# Patient Record
Sex: MALE | Race: WHITE | NOT HISPANIC OR LATINO | Employment: FULL TIME | URBAN - METROPOLITAN AREA
[De-identification: names, ages, dates, MRNs, and addresses within clinical notes are randomized per-mention and may not be internally consistent; named-entity substitution may affect disease eponyms.]

---

## 2022-05-24 ENCOUNTER — ANCILLARY PROCEDURE (OUTPATIENT)
Dept: GENERAL RADIOLOGY | Facility: CLINIC | Age: 32
End: 2022-05-24
Attending: FAMILY MEDICINE
Payer: OTHER MISCELLANEOUS

## 2022-05-24 ENCOUNTER — OFFICE VISIT (OUTPATIENT)
Dept: FAMILY MEDICINE | Facility: CLINIC | Age: 32
End: 2022-05-24
Payer: OTHER MISCELLANEOUS

## 2022-05-24 VITALS
SYSTOLIC BLOOD PRESSURE: 113 MMHG | DIASTOLIC BLOOD PRESSURE: 73 MMHG | OXYGEN SATURATION: 94 % | HEART RATE: 54 BPM | TEMPERATURE: 98.2 F | RESPIRATION RATE: 18 BRPM | WEIGHT: 164 LBS

## 2022-05-24 DIAGNOSIS — S99.922A FOOT INJURY, LEFT, INITIAL ENCOUNTER: Primary | ICD-10-CM

## 2022-05-24 DIAGNOSIS — S99.921A FOOT INJURY, RIGHT, INITIAL ENCOUNTER: ICD-10-CM

## 2022-05-24 PROCEDURE — 73630 X-RAY EXAM OF FOOT: CPT | Mod: TC | Performed by: RADIOLOGY

## 2022-05-24 PROCEDURE — 99213 OFFICE O/P EST LOW 20 MIN: CPT | Performed by: FAMILY MEDICINE

## 2022-05-24 ASSESSMENT — PATIENT HEALTH QUESTIONNAIRE - PHQ9
SUM OF ALL RESPONSES TO PHQ QUESTIONS 1-9: 16
10. IF YOU CHECKED OFF ANY PROBLEMS, HOW DIFFICULT HAVE THESE PROBLEMS MADE IT FOR YOU TO DO YOUR WORK, TAKE CARE OF THINGS AT HOME, OR GET ALONG WITH OTHER PEOPLE: EXTREMELY DIFFICULT
SUM OF ALL RESPONSES TO PHQ QUESTIONS 1-9: 16

## 2022-05-24 NOTE — LETTER
[facility] [address] [place] [address]  Phone: [phone]    May 24, 2022        Victor Manuel Saavedra  [facility] [place] [address]          To whom it may concern:    RE: Victor Manuel Saavedra    Patient was seen and treated today at our clinic. Please excuse pt from work until 5/30/2022 to recover from an injury.    Please contact me for questions or concerns.      Sincerely,        Vincent Nunes, DO

## 2022-05-24 NOTE — PROGRESS NOTES
Assessment & Plan     Foot injury, left, initial encounter  -Pain mostly over 1st metatarsal, no obvious deformity. Most likely strain injury, persistent as pt still working on his feet.  -Discuss obtaining xray today to look for any bony abnormalities. Recommended post-op shoe and off work for the next week to help with healing, work letter provided  -Podiatry referral place per patient request  - POST OP SHOE DELUXE MALE  - XR Foot Left G/E 3 Views  - Orthopedic  Referral    Vincent Nunes DO  [facility]    Federico Rush is a 31 year old who presents for the following health issues     New patient. Here for foot injury that occurred on 5/14/2022. Patient climbs towers for work. Was climbing tower with metal rungs when he placed all of his weight over his left foot to leverage himself up. Has had pain over the bones in his large toe since. Was wearing steel toed shoes. Pain persistent, worse after working. Has been on light duty past few days but still having pain. States no significant swelling or brusing. Would like referral to podiatrist. Denies numbness, tingling, weakness.     Review of Systems         Objective    /73 (BP Location: Left arm, Patient Position: Sitting, Cuff Size: Adult Regular)   Pulse 54   Temp 98.2  F (36.8  C) (Temporal)   Resp 18   Wt 74.4 kg (164 lb)   SpO2 94%   There is no height or weight on file to calculate BMI.  Physical Exam   GENERAL: healthy, alert and no distress  MS: left foot: tenderness over mid-anterior 1st metatarsal to palpation, no deformities noted. Strength and sensation in left foot intact.

## 2022-05-26 NOTE — TELEPHONE ENCOUNTER
DIAGNOSIS: (L) foot injury / Manju, Vincent / [facility] / XR   APPOINTMENT DATE: 6.8.22   NOTES STATUS DETAILS   OFFICE NOTE from referring provider Internal 5.24.22 Dr Vincent Nunes, [facility]   MEDICATION LIST Internal    XRAYS (IMAGES & REPORTS) Internal 5.24.22 L foot

## 2022-05-27 ENCOUNTER — MYC MEDICAL ADVICE (OUTPATIENT)
Dept: FAMILY MEDICINE | Facility: CLINIC | Age: 32
End: 2022-05-27
Payer: COMMERCIAL

## 2022-05-27 NOTE — TELEPHONE ENCOUNTER
Forwarding to Dr. Nunes.  Work comp episode started for date of visit 5/24/22.    WILMA Santoyo RN  [facility]

## 2022-05-31 ENCOUNTER — VIRTUAL VISIT (OUTPATIENT)
Dept: FAMILY MEDICINE | Facility: CLINIC | Age: 32
End: 2022-05-31
Payer: OTHER MISCELLANEOUS

## 2022-05-31 DIAGNOSIS — S99.922D FOOT INJURY, LEFT, SUBSEQUENT ENCOUNTER: Primary | ICD-10-CM

## 2022-05-31 DIAGNOSIS — Z02.6 ENCOUNTER RELATED TO WORKER'S COMPENSATION CLAIM: ICD-10-CM

## 2022-05-31 PROCEDURE — 99212 OFFICE O/P EST SF 10 MIN: CPT | Mod: 95 | Performed by: PHYSICIAN ASSISTANT

## 2022-05-31 NOTE — PROGRESS NOTES
Victor Manuel is a 31 year old who is being evaluated via a billable video visit.      How would you like to obtain your AVS? MyChart  If the video visit is dropped, the invitation should be resent by: Text to cell phone: [phone]   Will anyone else be joining your video visit? No    Video Start Time: 8:13 AM     Assessment & Plan     Foot injury, left, subsequent encounter  Encounter related to workers compensation claim  Patient needing note today to extend time off for injury. He does not feel light duty would be a good option. Has appointment with Sports Medicine next week. This is a virtual appointment today - not ideal to evaluate his symptoms.        Tobacco Cessation:   reports that he has been smoking. He has never used smokeless tobacco.  Tobacco Cessation Action Plan: Information offered: Patient not interested at this time        Return in about 8 days (around 6/8/2022) for Follow up with ortho.    THAD[facility]US Chaves [facility]    Federico Rush is a 31 year old who presents for the following health issues     HPI     Pain History:  When did you first notice your pain? - Acute Pain   Have you seen anyone else for your pain? Yes -[place] provider through [facility]  Where in your body do you have pain? Musculoskeletal problem/pain  Onset/Duration: Friday the 13th of May  Description  Location: foot - left  Joint Swelling: YES- minor  Redness: YES  Pain: YES  Warmth: YES  Intensity:  moderate  Progression of Symptoms:  same  Accompanying signs and symptoms:   Fevers: no  Numbness/tingling/weakness: no  History  Trauma to the area: YES- climbing a cell phone tower  Recent illness:  no  Previous similar problem: no  Previous evaluation:  YES  Precipitating or alleviating factors:  Aggravating factors include: walking, standing, sitting, sleeping  Therapies tried and outcome: Ibuprofen on occasion    Doesn't feel like there has been a change in symptoms.   Cell phone .      Concerned this is a lifetime injury.          Review of Systems   Constitutional, HEENT, cardiovascular, pulmonary, gi and gu systems are negative, except as otherwise noted.      Objective           Vitals:  No vitals were obtained today due to virtual visit.    Physical Exam   GENERAL: Healthy, alert and no distress  EYES: Eyes grossly normal to inspection.  No discharge or erythema, or obvious scleral/conjunctival abnormalities.  RESP: No audible wheeze, cough, or visible cyanosis.  No visible retractions or increased work of breathing.    SKIN: Visible skin clear. No significant rash, abnormal pigmentation or lesions.  NEURO: Cranial nerves grossly intact.  Mentation and speech appropriate for age.  PSYCH: Mentation appears normal, affect normal/bright, judgement and insight intact, normal speech and appearance well-groomed.                Video-Visit Details    Type of service:  Video Visit    Video End Time:8:22 AM    Originating Location (pt. Location): Home    Distant Location (provider location):  [facility]     Platform used for Video Visit: ROIÂ²

## 2022-05-31 NOTE — TELEPHONE ENCOUNTER
pt had virtual visit today about foot issue, see note RN wrote to pt    Kimberly Castro RN, BSN  [facility]

## 2022-05-31 NOTE — LETTER
May 31, 2022      Victor Manuel Saavedra  [facility] [place] [address]        To Whom It May Concern:    Victor Manuel Saavedra was seen in our clinic. He may not return to work at this time. He has a follow up on June 8th and restrictions will be re-evaluated at that time.      Sincerely,        Cathy Akhtar PA-C

## 2022-06-06 ENCOUNTER — TELEPHONE (OUTPATIENT)
Dept: BEHAVIORAL HEALTH | Facility: CLINIC | Age: 32
End: 2022-06-06
Payer: COMMERCIAL

## 2022-06-08 ENCOUNTER — OFFICE VISIT (OUTPATIENT)
Dept: ORTHOPEDICS | Facility: CLINIC | Age: 32
End: 2022-06-08
Payer: OTHER MISCELLANEOUS

## 2022-06-08 ENCOUNTER — HOSPITAL ENCOUNTER (OUTPATIENT)
Dept: BEHAVIORAL HEALTH | Facility: CLINIC | Age: 32
Discharge: HOME | End: 2022-06-08
Attending: FAMILY MEDICINE
Payer: COMMERCIAL

## 2022-06-08 ENCOUNTER — PRE VISIT (OUTPATIENT)
Dept: ORTHOPEDICS | Facility: CLINIC | Age: 32
End: 2022-06-08
Payer: COMMERCIAL

## 2022-06-08 VITALS — WEIGHT: 164 LBS

## 2022-06-08 DIAGNOSIS — S93.522A TURF TOE OF LEFT FOOT: Primary | ICD-10-CM

## 2022-06-08 PROCEDURE — 99203 OFFICE O/P NEW LOW 30 MIN: CPT | Performed by: FAMILY MEDICINE

## 2022-06-08 PROCEDURE — 999N000216 HC STATISTIC ADULT CD FACE TO FACE-NO CHRG: Mod: GT | Performed by: COUNSELOR

## 2022-06-08 ASSESSMENT — ANXIETY QUESTIONNAIRES
1. FEELING NERVOUS, ANXIOUS, OR ON EDGE: NEARLY EVERY DAY
7. FEELING AFRAID AS IF SOMETHING AWFUL MIGHT HAPPEN: NEARLY EVERY DAY
6. BECOMING EASILY ANNOYED OR IRRITABLE: NEARLY EVERY DAY
2. NOT BEING ABLE TO STOP OR CONTROL WORRYING: NEARLY EVERY DAY
GAD7 TOTAL SCORE: 21
8. IF YOU CHECKED OFF ANY PROBLEMS, HOW DIFFICULT HAVE THESE MADE IT FOR YOU TO DO YOUR WORK, TAKE CARE OF THINGS AT HOME, OR GET ALONG WITH OTHER PEOPLE?: EXTREMELY DIFFICULT
3. WORRYING TOO MUCH ABOUT DIFFERENT THINGS: NEARLY EVERY DAY
5. BEING SO RESTLESS THAT IT IS HARD TO SIT STILL: NEARLY EVERY DAY
GAD7 TOTAL SCORE: 21
4. TROUBLE RELAXING: NEARLY EVERY DAY
GAD7 TOTAL SCORE: 21
7. FEELING AFRAID AS IF SOMETHING AWFUL MIGHT HAPPEN: NEARLY EVERY DAY

## 2022-06-08 ASSESSMENT — COLUMBIA-SUICIDE SEVERITY RATING SCALE - C-SSRS
4. HAVE YOU HAD THESE THOUGHTS AND HAD SOME INTENTION OF ACTING ON THEM?: NO
6. HAVE YOU EVER DONE ANYTHING, STARTED TO DO ANYTHING, OR PREPARED TO DO ANYTHING TO END YOUR LIFE?: NO
3. HAVE YOU BEEN THINKING ABOUT HOW YOU MIGHT KILL YOURSELF?: NO
1. IN THE PAST MONTH, HAVE YOU WISHED YOU WERE DEAD OR WISHED YOU COULD GO TO SLEEP AND NOT WAKE UP?: NO
5. HAVE YOU STARTED TO WORK OUT OR WORKED OUT THE DETAILS OF HOW TO KILL YOURSELF? DO YOU INTEND TO CARRY OUT THIS PLAN?: NO
2. HAVE YOU ACTUALLY HAD ANY THOUGHTS OF KILLING YOURSELF IN THE PAST MONTH?: NO

## 2022-06-08 NOTE — LETTER
6/8/2022      RE: Victor Manuel Saavedra  [facility] [place] [address]     Dear Colleague,    Thank you for referring your patient, Victor Manuel Saavedra, to the [facility] [place]. Please see a copy of my visit note below.    Sports Medicine Clinic Visit    PCP: No primary care provider on file.    Victor Manuel Saavedra is a 31 year old male who is seen  in consultation at the request of Dr. Nunes presenting with left great toe pain      Location of Pain: right first MTp  Duration of Pain: 1 month(s)  Rating of Pain: 6/10  Pain is better with: Post op shoe  Pain is worse with: walking, standing, sleeping  Additional Features: tenderness  Treatment so far consists of: post op shoes, ibuprofen, ice, rest  Prior History of related problems: none    There were no vitals taken for this visit.    Acute left foot injury 5/14/2022, three weeks ago.    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  As he did so, his left great toe at the MTP was forced into plantarflexion, as the dorsum of the MTP happened to be levered against a cross beam concomitantly.   Has had pain over the dorsum of his large toe MTP since. Has been on light duty past few days but still having pain. States no significant swelling or bruising at the time.  Negative x-ray of the foot 5/24/2022 with no bony abnormality involving the great toe.      Images reviewed by me:  XR FOOT LEFT G/E 3 VIEWS 5/24/2022 1:49 PM      HISTORY: pain over great metatarsal bone. Injured while climbing  ladder x 1.5 weeks; Foot injury, right, initial encounter     COMPARISON: None.                                                                       IMPRESSION: Normal joint spaces and alignment. No evidence of a  fracture, with attention to the first  metatarsal.     KATIE UMANZOR MD           Procedure /Surgery  Excision of capillary hemangioma left ankle  Date of Procedure/Surgery- 4/12/17 / [facility]    XR FOOT 3 VIEWS LEFT  2/16/2017  [facility]    Narrative    RADIOGRAPHIC FINDINGS:     3 weightbearing views of the left foot were obtained in clinic today.   Calcaneal inclination angle is normal. Talar declination angle is nromal.   1-2 intermetatarsal angle is normal.  Elevated first ray. No fractures   noted. Metatarsus adductus noted.     Genesis Staton DPM   Foot and Ankle Surgery/Podiatry       PMH:  Active problem list:  Patient Active Problem List   Diagnosis     Dysthymic Disorder     Traumatic Urethral Stricture     Fatigue     Tachycardia     Unspecified epilepsy without mention of intractable epilepsy     Fainting     Headache     Hematuria, unspecified     Urethral stricture unspecified       FH:  No family history on file.    SH:  Social History     Socioeconomic History     Marital status: Single     Spouse name: Not on file     Number of children: Not on file     Years of education: Not on file     Highest education level: Not on file   Occupational History     Not on file   Tobacco Use     Smoking status: Current Some Day Smoker     Smokeless tobacco: Never Used   Substance and Sexual Activity     Alcohol use: Yes     Drug use: Never     Sexual activity: Not on file   Other Topics Concern     Not on file   Social History Narrative     Not on file     Social Determinants of Health     Financial Resource Strain: Not on file   Food Insecurity: Not on file   Transportation Needs: Not on file   Physical Activity: Not on file   Stress: Not on file   Social Connections: Not on file   Intimate Partner Violence: Not on file   Housing Stability: Not on file       MEDS:  See EMR, reviewed  ALL:  See EMR, reviewed    REVIEW OF SYSTEMS:  CONSTITUTIONAL:NEGATIVE for fever, chills, change in weight  INTEGUMENTARY/SKIN: NEGATIVE for worrisome rashes,  moles or lesions  EYES: NEGATIVE for vision changes or irritation  ENT/MOUTH: NEGATIVE for ear, mouth and throat problems  RESP:NEGATIVE for significant cough or SOB  BREAST: NEGATIVE for masses, tenderness or discharge  CV: NEGATIVE for chest pain, palpitations or peripheral edema  GI: NEGATIVE for nausea, abdominal pain, heartburn, or change in bowel habits  :NEGATIVE for frequency, dysuria, or hematuria  :NEGATIVE for frequency, dysuria, or hematuria  NEURO: NEGATIVE for weakness, dizziness or paresthesias  ENDOCRINE: NEGATIVE for temperature intolerance, skin/hair changes  HEME/ALLERGY/IMMUNE: NEGATIVE for bleeding problems  PSYCHIATRIC: NEGATIVE for changes in mood or affect    Objective: There is no swelling or discoloration about the foot.  He is nontender in the area of Lisfranc.  He has some tenderness over the dorsum of the MTP, and will bother him dorsally with forced dorsiflexion.  He has full dorsiflexion and volar flexion passive range of motion.  He is nontender over the tibial or fibular sesamoids.  He has full strength to dorsiflexion and volar flexion of the toe against resistance.  Overlying skin is intact.  Appropriate in conversation affect.    We reviewed x-rays that show no acute fracture or bony abnormality involving the MTP.  He has a bone island but no signs of bipartite sesamoid.    Assessment MTP discomfort status post acute compression injury 3 weeks ago, suspect bone bruise or capsular injury consistent with turf toe    Plan: He will continue with the postop shoe or we discussed the possibility of substituting a half steel insert, reviewed online.  A work restriction was given for the next 3 weeks physical therapy in the meantime.    Again, thank you for allowing me to participate in the care of your patient.      Sincerely,    Gallito Bonilla MD

## 2022-06-08 NOTE — PATIENT INSTRUCTIONS
Date: Wednesday, 6/15/2022  Time: 9:00 am - 10:00 am  Provider: Sarah Beth Bedolla MA  [facility]  Location: [place], [address] [place] [address], [place] [id number]  Phone: [phone]  Type: Therapy - Initial (In-Person)    Patient Instructions  Paperwork available on our portal. Your email is require for us to set up portal access. [email or web address] Email: [email or web address]    Date: Thursday, 6/23/2022  Time: 2:00 pm - 3:00 pm  Provider: Donna Roach MA, PA-C  Location: [facility], [address] [place] [address], [facility] [address]  Phone: [phone]  Type: Telepsychiatry      Patient Instructions  Please fill New Patient Form by using following link or call us to request link to be sent to you. [email or web address] (1) All forms need to be completed ATLEAST 96 HOURS prior to appointment. (2) Please call us on [phone] - 96 HOURS prior to your scheduled appointment to confirm that you are able to attend. We will provide you information about how to log into video call software when you call.

## 2022-06-08 NOTE — LETTER
2022    Victor Manuel Saavedra  [address] [place] [address]  [phone] (home)     :     1990      To Whom it May Concern:    This patient was seen today in clinic for his left great toe injury that occurred 2022.  For the next 3 weeks, from 2022 to 2022, he will remain on light duty.  He will be at a seated job with minimal ambulation, and will not be squatting, climbing,or  lifting greater than 20 pounds.  He will continue with his toe immobilization device, and will see physical therapy.  Follow-up in 3 weeks for reevaluation of work restrictions.      Sincerely,        Gallito Bonilla MD

## 2022-06-08 NOTE — PROGRESS NOTES
"[facility]    ADULT Mental Health Assessment Appointment Note    PATIENT'S NAME:  Victor Manuel Saavedra    Preferred Pronoun: He/him   MRN: [id number]  YOB: 1990  Address:  [address] [place] [address]  PREFERRED PHONE: [phone]  May we leave a referral related message: Yes    DATE OF SERVICE: 22  START TIME: 1:30pm   END TIME: 1:43pm   SERVICE MODALITY:  Video Visit:      Provider verified identity through the following two step process.  Patient provided:  Patient  and Patient address    Telemedicine Visit: The patient's condition can be safely assessed and treated via synchronous audio and visual telemedicine encounter.      Reason for Telemedicine Visit: Services only offered telehealth    Originating Site (Patient Location): Patient's home    Distant Site (Provider Location): [facility]    Consent:  The patient/guardian has verbally consented to: the potential risks and benefits of telemedicine (video visit) versus in person care; bill my insurance or make self-payment for services provided; and responsibility for payment of non-covered services.     Patient would like the video invitation sent by:  My Chart    Mode of Communication:  Video Conference via [employer]    As the provider I attest to compliance with applicable laws and regulations related to telemedicine.    Identifying Information:  Patient is a 31 year old, .  Patient was referred for an assessment by self.  Patient attended the session alone. Patient identified their preferred language to be English. Patient reported they does not need the assistance of an  or other support involved in therapy.     Services Requested:   The reason for seeking services at this time is: \" ADHD/Anxiety medication/ life skills and PTSD therapy \" Current Mental Status Exam:   Appearance:  Appropriate    Eye Contact:  Good "   Psychomotor:  Normal   Attitude / Demeanor: Cooperative   Speech Rate:  Normal/ Responsive  Volume:  Normal  volume  Language:  no problems  Mood:   Normal  Affect:   Appropriate    Thought Content: Clear   Thought Process: Coherent     Associations:  No loosening of associations  Insight:   Good   Judgment:  Intact   Orientation:  All  Attention:  Good    Rating Scales:  PHQ9:    PHQ-9 SCORE 5/24/2022   PHQ-9 Total Score MyChart 16 (Moderately severe depression)   PHQ-9 Total Score 16   ;    GAD7:    STEPHANI-7 SCORE 6/8/2022   Total Score 21 (severe anxiety)   Total Score 21       Safety Assessment:   Current Safety Concerns:  [place] Suicide Severity Rating Scale (Lifetime/Recent)  [place] Suicide Severity Rating (Lifetime/Recent) 6/8/2022   Q1 Wished to be Dead (Past Month) no   Q2 Suicidal Thoughts (Past Month) no   Q3 Suicidal Thought Method no   Q4 Suicidal Intent without Specific Plan no   Q5 Suicide Intent with Specific Plan no   Q6 Suicide Behavior (Lifetime) no   Level of Risk per Screen low risk             Therapeutic Interventions Provided: supportive active listening and explored alternatives    Recommendations/Plan: Clinician explained DA process and options. Pt would like therapy and medication management.     Referrals:     Date: Wednesday, 6/15/2022  Time: 9:00 am - 10:00 am  Provider: Sarah Beth Bedolla MA  [facility]  Location: [facility], [address] [place] [address]  Phone: [phone]  Type: Therapy - Initial (In-Person)    Patient Instructions  Paperwork available on our portal. Your email is require for us to set up portal access. [email or web address] Email: [email or web address]    Date: Thursday, 6/23/2022  Time: 2:00 pm - 3:00 pm  Provider: Donna Roach MA  [facility]  Location: [facility], [address] [place] [address], [place] [address]  Phone: [phone]  Type: Telepsychiatry      Patient Instructions  Please  fill New Patient Form by using following link or call us to request link to be sent to you. [email or web address] (1) All forms need to be completed ATLEAST 96 HOURS prior to appointment. (2) Please call us on [phone] - 96 HOURS prior to your scheduled appointment to confirm that you are able to attend. We will provide you information about how to log into video call software when you call.      Mayi Giron [facility],  June 8, 2022  Mental Health and Addiction Services Assessment Center

## 2022-06-08 NOTE — PROGRESS NOTES
Sports Medicine Clinic Visit    PCP: No primary care provider on file.    Victor Manuel Saavedra is a 31 year old male who is seen  in consultation at the request of Dr. Nunes presenting with left great toe pain      Location of Pain: right first MTp  Duration of Pain: 1 month(s)  Rating of Pain: 6/10  Pain is better with: Post op shoe  Pain is worse with: walking, standing, sleeping  Additional Features: tenderness  Treatment so far consists of: post op shoes, ibuprofen, ice, rest  Prior History of related problems: none    There were no vitals taken for this visit.    Acute left foot injury 5/14/2022, three weeks ago.    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  As he did so, his left great toe at the MTP was forced into plantarflexion, as the dorsum of the MTP happened to be levered against a cross beam concomitantly.   Has had pain over the dorsum of his large toe MTP since. Has been on light duty past few days but still having pain. States no significant swelling or bruising at the time.  Negative x-ray of the foot 5/24/2022 with no bony abnormality involving the great toe.      Images reviewed by me:  XR FOOT LEFT G/E 3 VIEWS 5/24/2022 1:49 PM      HISTORY: pain over great metatarsal bone. Injured while climbing  ladder x 1.5 weeks; Foot injury, right, initial encounter     COMPARISON: None.                                                                       IMPRESSION: Normal joint spaces and alignment. No evidence of a  fracture, with attention to the first metatarsal.     KATIE UMANZOR MD           Procedure /Surgery  Excision of capillary hemangioma left ankle  Date of Procedure/Surgery- 4/12/17 / [facility]    XR FOOT 3 VIEWS LEFT  2/16/2017  [facility]    Narrative    RADIOGRAPHIC FINDINGS:     3  weightbearing views of the left foot were obtained in clinic today.   Calcaneal inclination angle is normal. Talar declination angle is nromal.   1-2 intermetatarsal angle is normal.  Elevated first ray. No fractures   noted. Metatarsus adductus noted.     Genesis Staton DPM   Foot and Ankle Surgery/Podiatry       PMH:  Active problem list:  Patient Active Problem List   Diagnosis     Dysthymic Disorder     Traumatic Urethral Stricture     Fatigue     Tachycardia     Unspecified epilepsy without mention of intractable epilepsy     Fainting     Headache     Hematuria, unspecified     Urethral stricture unspecified       FH:  No family history on file.    SH:  Social History     Socioeconomic History     Marital status: Single     Spouse name: Not on file     Number of children: Not on file     Years of education: Not on file     Highest education level: Not on file   Occupational History     Not on file   Tobacco Use     Smoking status: Current Some Day Smoker     Smokeless tobacco: Never Used   Substance and Sexual Activity     Alcohol use: Yes     Drug use: Never     Sexual activity: Not on file   Other Topics Concern     Not on file   Social History Narrative     Not on file     Social Determinants of Health     Financial Resource Strain: Not on file   Food Insecurity: Not on file   Transportation Needs: Not on file   Physical Activity: Not on file   Stress: Not on file   Social Connections: Not on file   Intimate Partner Violence: Not on file   Housing Stability: Not on file       MEDS:  See EMR, reviewed  ALL:  See EMR, reviewed    REVIEW OF SYSTEMS:  CONSTITUTIONAL:NEGATIVE for fever, chills, change in weight  INTEGUMENTARY/SKIN: NEGATIVE for worrisome rashes, moles or lesions  EYES: NEGATIVE for vision changes or irritation  ENT/MOUTH: NEGATIVE for ear, mouth and throat problems  RESP:NEGATIVE for significant cough or SOB  BREAST: NEGATIVE for masses, tenderness or discharge  CV: NEGATIVE for chest pain,  palpitations or peripheral edema  GI: NEGATIVE for nausea, abdominal pain, heartburn, or change in bowel habits  :NEGATIVE for frequency, dysuria, or hematuria  :NEGATIVE for frequency, dysuria, or hematuria  NEURO: NEGATIVE for weakness, dizziness or paresthesias  ENDOCRINE: NEGATIVE for temperature intolerance, skin/hair changes  HEME/ALLERGY/IMMUNE: NEGATIVE for bleeding problems  PSYCHIATRIC: NEGATIVE for changes in mood or affect    Objective: There is no swelling or discoloration about the foot.  He is nontender in the area of Lisfranc.  He has some tenderness over the dorsum of the MTP, and will bother him dorsally with forced dorsiflexion.  He has full dorsiflexion and volar flexion passive range of motion.  He is nontender over the tibial or fibular sesamoids.  He has full strength to dorsiflexion and volar flexion of the toe against resistance.  Overlying skin is intact.  Appropriate in conversation affect.    We reviewed x-rays that show no acute fracture or bony abnormality involving the MTP.  He has a bone island but no signs of bipartite sesamoid.    Assessment MTP discomfort status post acute compression injury 3 weeks ago, suspect bone bruise or capsular injury consistent with turf toe    Plan: He will continue with the postop shoe or we discussed the possibility of substituting a half steel insert, reviewed online.  A work restriction was given for the next 3 weeks physical therapy in the meantime.

## 2022-06-15 ENCOUNTER — HOSPITAL ENCOUNTER (OUTPATIENT)
Dept: PHYSICAL THERAPY | Facility: REHABILITATION | Age: 32
Discharge: HOME | End: 2022-06-15
Attending: FAMILY MEDICINE
Payer: OTHER MISCELLANEOUS

## 2022-06-15 DIAGNOSIS — R26.89 ANTALGIC GAIT: Primary | ICD-10-CM

## 2022-06-15 DIAGNOSIS — S93.522A TURF TOE OF LEFT FOOT: ICD-10-CM

## 2022-06-15 DIAGNOSIS — M25.675 DECREASED ROM OF LEFT FOOT: ICD-10-CM

## 2022-06-15 PROCEDURE — 97161 PT EVAL LOW COMPLEX 20 MIN: CPT | Mod: GP | Performed by: PHYSICAL THERAPIST

## 2022-06-15 PROCEDURE — 97110 THERAPEUTIC EXERCISES: CPT | Mod: GP | Performed by: PHYSICAL THERAPIST

## 2022-06-15 PROCEDURE — 97140 MANUAL THERAPY 1/> REGIONS: CPT | Mod: GP | Performed by: PHYSICAL THERAPIST

## 2022-06-16 NOTE — PROGRESS NOTES
06/15/22 1300   Signing Clinician's Name / Credentials   Signing clinician's name / credentials Saskia Kothari PT   Session Number   Session Number 1/12   Progress Note/Recertification   Progress Note Due Date 09/13/22   Adult Goals   PT Ortho Eval Goals 1;2;3   Ortho Goal 1   Goal Identifier home program   Goal Description patient will be independent with home exercise program and self management of pain in 8 weeks   Target Date 08/14/22   Ortho Goal 2   Goal Identifier walking   Goal Description patient will be able to return to walking for household chores on feet x 20 minutes with pain less than 5/10   Target Date 09/13/22   Ortho Goal 3   Goal Identifier work   Goal Description 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  As he did so, his left great toe at the MTP was forced into plantarflexion, as the dorsum of the MTP happened to be levered against a cross beam.  Has had pain over the plantar surface of his large toe MTP since. Has been on light duty past few weeks but still having pain. States no significant swelling or bruising at the time of injury but pain constant since injury of 5/10-10/10.  Also has been wearing post surgical boot with walking. Patient unable to walk longer than household or short community distances due to pain. Negative x-ray of the foot 5/24/2022. Patient is not taking any pain medications or OTC meds for pain.   Objective Measures   Objective Measures Objective Measure 1;Objective Measure 2;Objective Measure 3   Objective Measure 1   Objective Measure 1st MTP left foot ROM   Objective Measure 2    Objective Measure ankle strength pf/df   Objective Measure 3   Objective Measure gait observation   Treatment Interventions   Interventions Therapeutic Procedure/Exercise;Neuromuscular Re-education;Manual Therapy   Therapeutic Procedure/exercise   Therapeutic Procedures: strength, endurance, ROM, flexibillity minutes (62748) 8   Skilled Intervention therapeutic exercise   Patient Response patient is guarded with movement, but did slowly tolerate exercise   Treatment Detail supine ankle pumps df/pf x 10, supine ankle circles cw/ccw x 10, seated ankle pumps x 10, seated LAQ x 10   Neuromuscular Re-education   Skilled Intervention neuromuscular re-ed   Treatment Detail consider Kinesiotape for left foot MTP pain   Gait Training   Treatment Detail consider gait training   Manual Therapy   Manual Therapy: Mobilization, MFR, MLD, friction massage minutes (81192) 8   Skilled Intervention manual therapy   Patient Response guarded initially, then tolerated well   Treatment Detail supine 90/90 for MT with gentle STM effleurage distal to proximal of left foot/1st toe and MTP   Assessments Completed   Assessments Completed LEFS = 20/80   Equipment Needs   Equipment Needs walking boot/post-surgical boot (no surgery however)   Education   Learner Patient   Readiness Acceptance   Method Demonstration   Response Demonstrates Understanding   Plan   Homework PTRX   Home program ankle pumps supine, ankle circles, seated ankle pumps seated LAQ   Plan for next session review all ex, add towel scrunch left foot, nustep with boot on, standing left hip abd/flex/ext/ knee flexion   Comments   Comments non-weightbearing rom short term and progress as tolerated for left MTP injury   Total Session Time   Timed Code Treatment Minutes 16   Total Treatment Time (sum of timed and untimed services) 16   AMBULATORY CLINICS ONLY-MEDICAL AND TREATMENT DIAGNOSIS   Medical Diagnosis left foot turf toe (1st MTP)   PT Diagnosis left foot pain at 1st MTP    ALMA HIGGINS, PT

## 2022-06-16 NOTE — PROGRESS NOTES
06/15/22 1300   General Information   Type of Visit Initial OP Ortho PT Evaluation   Start of Care Date 06/15/22   Referring Physician Dr. Gallito Bonilla   Patient/Family Goals Statement long term maintenance on turf toe injury   Orders Evaluate and Treat   Date of Order 06/08/22   Certification Required? 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  As he did so, his left great toe at the MTP was forced into plantarflexion, as the dorsum of the MTP happened to be levered against a cross beam.  Has had pain over the plantar surface of his large toe MTP since. Has been on light duty past few weeks but still having pain. States no significant swelling or bruising at the time of injury but pain constant since injury of 5/10-10/10.  Also has been wearing post surgical boot with walking. Patient unable to walk longer than household or short community distances due to pain. Negative x-ray of the foot 5/24/2022. Patient is not taking any pain medications or OTC meds for pain.   Impairments A. Pain;B. Decreased WB tolerance;C. Swelling;D. Decreased ROM;F. Decreased strength and endurance;H. Impaired gait;L. Tingling   Functional Limitations perform activities of daily living;perform required work activities;perform desired leisure / sports  activities   Symptom Location plantar surface of left foot over MTP of 1st toe   How/Where did it occur At work   Onset date of current episode/exacerbation 05/14/22   Chronicity New   Pain rating (0-10 point scale) Best (/10);Worst (/10)   Best (/10) 5   Worst (/10) 10   Pain quality A. Sharp;C. Aching   Frequency of pain/symptoms A. Constant   Pain/symptoms are: The same all the time   Pain/symptoms exacerbated by B. Walking;J. ADL;K. Home tasks   Pain/symptoms eased by A. Sitting;C. Rest   Progression of symptoms since onset: Unchanged   Current / Previous Interventions   Diagnostic Tests: X-ray   X-ray Results unremarkable   Prior Level of Function   Prior Level of Function-Mobility independent with work as  for cell towers   Prior Level of Function-ADLs indpendent with all ADLs-high functioning person   Functional Level Prior Comment independent   Current Level of Function   Patient role/employment history A. Employed   Employment Comments light duty   Living environment [place]   Current equipment-Gait/Locomotion Other  (wearing post surgical boot)   Current equipment-ADL None   Fall Risk Screen   Fall screen completed by PT   Have you fallen 2 or more times in the past year? No   Have you fallen and had an injury in the past year? No   Is patient a fall risk? No   Abuse Screen (yes response referral indicated)   Feels Unsafe at Home or Work/School no   Feels Threatened by Someone no   Does Anyone Try to Keep You From Having Contact with Others or Doing Things Outside Your Home? no   Physical Signs of Abuse Present no   Ankle/Foot Objective Findings   Side (if bilateral, select both right and left) Left   Observation mild edema noted near 1st MTP left foot and along longitudinal arch   Integumentary intact; does have hemangioma at left forefoot   Palpation tender to palpation along plantar aspect of left 1st MTP and medial MTP   Gait/Locomotion antalgic; wearing post surgical boot on left    Balance/ Proprioception (Single Leg Stance) not tested   Foot Position In Standing not tested   Left DF (Knee Ext) AROM limited by pain; 8 degrees   Left PF AROM limited by pain; 40 degrees   Left Calcanceal Inversion AROM limited by pain   Left Calcaneal Eversion AROM limited by pain   Left Great Toe Flexion AROM limited by pain; mod loss   Left DF/Inversion Strength not tested fully today due to pain   Planned Therapy Interventions   Planned Therapy Interventions manual therapy;neuromuscular re-education;ROM;strengthening   Planned Modality Interventions   Planned Modality Interventions Comments patient will apply cold packs to left foot 2x/day at home   Clinical Impression   Criteria for Skilled Therapeutic Interventions Met yes, treatment indicated   PT Diagnosis left foot pain at 1st MTP   Functional limitations due to impairments standing, walking, weightbearing on left, decreased range of motion   Clinical Presentation Stable/Uncomplicated   Clinical Decision Making (Complexity) Low complexity   Therapy Frequency 2 times/Week   Predicted Duration of Therapy Intervention (days/wks) 12 weeks   Risk & Benefits of therapy have been explained Yes   Patient, Family & other staff in agreement with plan of care Yes   Clinical Impression Comments symptoms consistent with soft tissue injury to left plantar surface of 1st MTP, bone contusion likely and possible sesamoid bone contusion; turf toe   Education Assessment   Preferred Learning Style Demonstration   Barriers to Learning No barriers   ORTHO GOALS   PT Ortho Eval Goals 1;2;3   Ortho Goal 1   Goal Identifier home program   Goal Description patient will be independent with home exercise program and self management of pain in 8 weeks   Target Date 08/14/22   Ortho Goal 2   Goal Identifier walking   Goal Description patient will be able to return to walking for household chores on feet x 20 minutes with pain less than 5/10   Target Date 09/13/22   Ortho Goal 3    Goal Identifier work   Goal Description 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

## 2022-06-16 NOTE — PROGRESS NOTES
06/15/22 1300   Signing Clinician's Name / Credentials   Signing clinician's name / credentials Saskia Kothari PT   Session Number   Session Number 1/12   Progress Note/Recertification   Progress Note Due Date 09/13/22   Adult Goals   PT Ortho Eval Goals 1;2;3   Ortho Goal 1   Goal Identifier home program   Goal Description patient will be independent with home exercise program and self management of pain in 8 weeks   Target Date 08/14/22   Ortho Goal 2   Goal Identifier walking   Goal Description patient will be able to return to walking for household chores on feet x 20 minutes with pain less than 5/10   Target Date 09/13/22   Ortho Goal 3   Goal Identifier work   Goal Description 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  As he did so, his left great toe at the MTP was forced into plantarflexion, as the dorsum of the MTP happened to be levered against a cross beam.  Has had pain over the plantar surface of his large toe MTP since. Has been on light duty past few weeks but still having pain. States no significant swelling or bruising at the time of injury but pain constant since injury of 5/10-10/10.  Also has been wearing post surgical boot with walking. Patient unable to walk longer than household or short community distances due to pain. Negative x-ray of the foot 5/24/2022. Patient is not taking any pain medications or OTC meds for pain.   Objective Measures   Objective Measures Objective Measure 1;Objective Measure 2;Objective Measure 3   Objective Measure 1   Objective Measure 1st MTP left foot ROM   Objective Measure 2    Objective Measure ankle strength pf/df   Objective Measure 3   Objective Measure gait observation   Treatment Interventions   Interventions Therapeutic Procedure/Exercise;Neuromuscular Re-education;Manual Therapy   Therapeutic Procedure/exercise   Therapeutic Procedures: strength, endurance, ROM, flexibillity minutes (23231) 8   Skilled Intervention therapeutic exercise   Patient Response patient is guarded with movement, but did slowly tolerate exercise   Treatment Detail supine ankle pumps df/pf x 10, supine ankle circles cw/ccw x 10, seated ankle pumps x 10, seated LAQ x 10   Neuromuscular Re-education   Skilled Intervention neuromuscular re-ed   Treatment Detail consider Kinesiotape for left foot MTP pain   Gait Training   Treatment Detail consider gait training   Manual Therapy   Manual Therapy: Mobilization, MFR, MLD, friction massage minutes (08081) 8   Skilled Intervention manual therapy   Patient Response guarded initially, then tolerated well   Treatment Detail supine 90/90 for MT with gentle STM effleurage distal to proximal of left foot/1st toe and MTP   Equipment Needs   Equipment Needs walking boot/post-surgical boot (no surgery however)   Education   Learner Patient   Readiness Acceptance   Method Demonstration   Response Demonstrates Understanding   Plan   Homework PTRX   Home program ankle pumps supine, ankle circles, seated ankle pumps seated LAQ   Plan for next session review all ex, add towel scrunch left foot, nustep with boot on, standing left hip abd/flex/ext/ knee flexion   Comments   Comments non-weightbearing rom short term and progress as tolerated for left MTP injury   Total Session Time   Timed Code Treatment Minutes 16   Total Treatment Time (sum of timed and untimed services) 16   AMBULATORY CLINICS ONLY-MEDICAL AND TREATMENT DIAGNOSIS   Medical Diagnosis left foot turf toe (1st MTP)   PT Diagnosis left foot pain at 1st MTP   ALMA HIGGINS, PT

## 2022-06-16 NOTE — ADDENDUM NOTE
Encounter addended by: Saskia Kothari, PT on: 6/15/2022 8:12 PM   Actions taken: Clinical Note Signed, Flowsheet accepted

## 2022-06-22 ENCOUNTER — HOSPITAL ENCOUNTER (OUTPATIENT)
Dept: PHYSICAL THERAPY | Facility: REHABILITATION | Age: 32
Discharge: HOME | End: 2022-06-22
Payer: OTHER MISCELLANEOUS

## 2022-06-22 DIAGNOSIS — S93.522A TURF TOE OF LEFT FOOT: Primary | ICD-10-CM

## 2022-06-22 DIAGNOSIS — M25.675 DECREASED ROM OF LEFT FOOT: ICD-10-CM

## 2022-06-22 DIAGNOSIS — R26.89 ANTALGIC GAIT: ICD-10-CM

## 2022-06-22 PROCEDURE — 97140 MANUAL THERAPY 1/> REGIONS: CPT | Mod: GP | Performed by: PHYSICAL THERAPIST

## 2022-06-22 PROCEDURE — 97110 THERAPEUTIC EXERCISES: CPT | Mod: GP | Performed by: PHYSICAL THERAPIST

## 2022-06-22 PROCEDURE — 97112 NEUROMUSCULAR REEDUCATION: CPT | Mod: GP | Performed by: PHYSICAL THERAPIST

## 2022-06-23 NOTE — PROGRESS NOTES
06/22/22 1300   Signing Clinician's Name / Credentials   Signing clinician's name / credentials Saskia Kothari PT   Session Number   Session Number 2/12   Progress Note/Recertification   Progress Note Due Date 09/13/22   Adult Goals   PT Ortho Eval Goals 1;2;3   Ortho Goal 1   Goal Identifier home program   Goal Description patient will be independent with home exercise program and self management of pain in 8 weeks   Goal Progress progressing towards slowly as patient is cautious with left foot rom   Target Date 08/14/22   Ortho Goal 2   Goal Identifier walking   Goal Description patient will be able to return to walking for household chores on feet x 20 minutes with pain less than 5/10   Goal Progress patient continues to wear post surgical boot with flat bottom and walking is very painful and can only tolerate short distances due to pain   Target Date 09/13/22   Ortho Goal 3   Goal Identifier work   Goal Description 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 Partner is doing gentle foot massage for patient. No change in pain as of now. 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  As he did so, his left great toe at the MTP was forced into plantarflexion, as the dorsum of the MTP happened to be levered against a cross beam.  Has had pain over the plantar surface of his large toe MTP since. Has been on light duty past few weeks but still having pain. States no significant swelling or bruising at the time of injury but pain constant since injury of 5/10-10/10.  Also has  been wearing post surgical boot with walking. Patient unable to walk longer than household or short community distances due to pain. Negative x-ray of the foot 5/24/2022. Patient is not taking any pain medications or OTC meds for pain.   Objective Measures   Objective Measures Objective Measure 1;Objective Measure 2;Objective Measure 3   Objective Measure 1   Objective Measure 1st MTP left foot ROM   Objective Measure 2   Objective Measure ankle strength pf/df   Objective Measure 3   Objective Measure gait observation   Treatment Interventions   Interventions Therapeutic Procedure/Exercise;Neuromuscular Re-education;Manual Therapy   Therapeutic Procedure/exercise   Therapeutic Procedures: strength, endurance, ROM, flexibillity minutes (39627) 22   Skilled Intervention therapeutic exercise   Patient Response patient is guarded with movement, but did slowly tolerate exercise   Treatment Detail seated ankle pumps df/pf x 10, seated ankle circles cw/ccw x 10, seated LAQ x 10, seated sartorious ER (needs handout for this one), added towel scrunch toes, and standing hip flex/abd/ext x 10 each   Progress good   Neuromuscular Re-education   Skilled Intervention neuromuscular re-ed   Treatment Detail 1 longitudinal strip over 1st ray dorsal and 1 strip at plantar 1st ray; 2 horizontal strips to anchor long strips   Neuromuscular re-ed of mvmt, balance, coord, kinesthetic sense, posture, proprioception minutes (04864) 8   Patient Response reports he can feel the tape and his foot is getting used to it after applied   Progress handout given for k-tape   Gait Training   Treatment Detail consider gait training   Manual Therapy   Manual Therapy: Mobilization, MFR, MLD, friction massage minutes (84566) 8   Skilled Intervention manual therapy   Patient Response guarded initially, then tolerated well   Treatment Detail supine 90/90 for MT with gentle STM effleurage distal to proximal of left foot/1st toe and MTP   Progress less  discomfort today-has mild redness at dorsal right 1st ray   Assessments Completed   Assessments Completed LEFS = 20/80   Equipment Needs   Equipment Needs walking boot/post-surgical boot (no surgery however)   Education   Learner Patient   Readiness Acceptance   Method Demonstration   Response Demonstrates Understanding   Communication with other professionals   Communication with other professionals sees MD 6/29 for follow up   Plan   Homework PTRX   Home program ankle pumps supine, ankle circles, seated ankle pumps seated LAQ, towel toe scrunch, standing left hip abd/flex/ext,   Updates to plan of care added standing hip abd/ext/flex and towel gather toe scrunch   Plan for next session add nustep and bike next visit   Comments   Comments progress WBAT with boot, recommend CAM walking boot as patient having difficulty walking with current boot and pain levels still high   Total Session Time   Timed Code Treatment Minutes 38   Total Treatment Time (sum of timed and untimed services) 40   AMBULATORY CLINICS ONLY-MEDICAL AND TREATMENT DIAGNOSIS   Medical Diagnosis left foot turf toe (1st MTP)   PT Diagnosis left foot pain at 1st MTP   ALMA HIGGINS, PT

## 2022-06-29 ENCOUNTER — OFFICE VISIT (OUTPATIENT)
Dept: ORTHOPEDICS | Facility: CLINIC | Age: 32
End: 2022-06-29
Payer: OTHER MISCELLANEOUS

## 2022-06-29 ENCOUNTER — HOSPITAL ENCOUNTER (OUTPATIENT)
Dept: PHYSICAL THERAPY | Facility: REHABILITATION | Age: 32
Discharge: HOME | End: 2022-06-29
Payer: OTHER MISCELLANEOUS

## 2022-06-29 VITALS — WEIGHT: 164 LBS

## 2022-06-29 DIAGNOSIS — M25.675 DECREASED ROM OF LEFT FOOT: ICD-10-CM

## 2022-06-29 DIAGNOSIS — S93.522A TURF TOE OF LEFT FOOT: Primary | ICD-10-CM

## 2022-06-29 DIAGNOSIS — R26.89 ANTALGIC GAIT: ICD-10-CM

## 2022-06-29 PROCEDURE — 97110 THERAPEUTIC EXERCISES: CPT | Mod: GP | Performed by: PHYSICAL THERAPIST

## 2022-06-29 PROCEDURE — 99212 OFFICE O/P EST SF 10 MIN: CPT | Performed by: FAMILY MEDICINE

## 2022-06-29 NOTE — LETTER
2022    Victor Manuel Saavedra  [address] [place] [address]  [phone] (home)     :     1990      To Whom it May Concern:    This patient was seen today in clinic for his left great toe injury that occurred 2022.  For the next 3 weeks, from 2022 to 2022, he will remain on light duty.  He will be at a seated job with minimal ambulation, and will not be squatting, climbing,or  lifting greater than 20 pounds.  He will continue with his toe immobilization device, and will see physical therapy.  Follow-up in 3 weeks for reevaluation of work restrictions.      Sincerely,        Gallito Bonilla MD

## 2022-06-29 NOTE — PROGRESS NOTES
Subjective:   Victor Manuel Saavedra is a 31 year old male who returns to clinic for left turf toe.     Date of injury: 2 months  Date last seen: 6/8/2022  Following Therapeutic Plan: Yes went to physical therapy for 2 visits  Pain: Improving  Function: Improving  Interval History: Today, the patient reports that physical therapy has been very beneficial. He has been strictly using the post-op shoe. He is now a 4/10 for pain, where he was previously 8/10.  He does not feel confident he has returning to work without restriction.  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  He is hoping for continued light restrictions for an additional 3 weeks, while he works on physical therapy and continues to heal.       Negative x-ray of the foot 5/24/2022 with no bony abnormality involving the great toe.      Images reviewed by me:  XR FOOT LEFT G/E 3 VIEWS 5/24/2022 1:49 PM      HISTORY: pain over great metatarsal bone. Injured while climbing  ladder x 1.5 weeks; Foot injury, right, initial encounter     COMPARISON: None.                                                                       IMPRESSION: Normal joint spaces and alignment. No evidence of a  fracture, with attention to the first metatarsal.     KATIE UMANZOR MD       PMH:  Active problem list:  Patient Active Problem List   Diagnosis     Dysthymic Disorder     Traumatic Urethral Stricture     Fatigue     Tachycardia     Unspecified epilepsy without mention of intractable epilepsy     Fainting     Headache     Hematuria, unspecified     Urethral stricture unspecified       FH:  No family history on file.    SH:  Social History     Socioeconomic History     Marital status: Single     Spouse name: Not on file     Number of children: Not on file     Years of education: Not on file     Highest education level: Not on file   Occupational History     Not on file   Tobacco Use     Smoking status: Current Some Day  Smoker     Smokeless tobacco: Never Used   Substance and Sexual Activity     Alcohol use: Yes     Drug use: Never     Sexual activity: Not on file   Other Topics Concern     Not on file   Social History Narrative     Not on file     Social Determinants of Health     Financial Resource Strain: Not on file   Food Insecurity: Not on file   Transportation Needs: Not on file   Physical Activity: Not on file   Stress: Not on file   Social Connections: Not on file   Intimate Partner Violence: Not on file   Housing Stability: Not on file       MEDS:  See EMR, reviewed  ALL:  See EMR, reviewed    REVIEW OF SYSTEMS:  CONSTITUTIONAL:NEGATIVE for fever, chills, change in weight  INTEGUMENTARY/SKIN: NEGATIVE for worrisome rashes, moles or lesions  EYES: NEGATIVE for vision changes or irritation  ENT/MOUTH: NEGATIVE for ear, mouth and throat problems  RESP:NEGATIVE for significant cough or SOB  BREAST: NEGATIVE for masses, tenderness or discharge  CV: NEGATIVE for chest pain, palpitations or peripheral edema  GI: NEGATIVE for nausea, abdominal pain, heartburn, or change in bowel habits  :NEGATIVE for frequency, dysuria, or hematuria  :NEGATIVE for frequency, dysuria, or hematuria  NEURO: NEGATIVE for weakness, dizziness or paresthesias  ENDOCRINE: NEGATIVE for temperature intolerance, skin/hair changes  HEME/ALLERGY/IMMUNE: NEGATIVE for bleeding problems  PSYCHIATRIC: NEGATIVE for changes in mood or affect        Objective: His exam is improved today as I do not find dorsal tenderness and he tolerates forced flexion and extension of the great toe MTP without significant discomfort.  He is nontender over the medial or lateral sesamoid.  He can flex and extend the toe against resistance with good strength.  Appropriate conversation affect.    Assessment great toe turf toe injury, improving    Plan: We went over the option of progressing to a half steel toe insert, which she could purchase online.  Work restrictions listed for  the next 3 weeks.  Follow-up in 3 weeks.

## 2022-06-29 NOTE — LETTER
Date:June 30, 2022      Patient was self referred, no letter generated. Do not send.        [facility] Health Information

## 2022-06-29 NOTE — LETTER
6/29/2022      RE: Victor Manuel Saavedra  [facility] [place] [address]     Dear Colleague,    Thank you for referring your patient, Victor Manuel Saavedra, to the [facility] [place]. Please see a copy of my visit note below.     Subjective:   Victor Manuel Saavedra is a 31 year old male who returns to clinic for left turf toe.     Date of injury: 2 months  Date last seen: 6/8/2022  Following Therapeutic Plan: Yes went to physical therapy for 2 visits  Pain: Improving  Function: Improving  Interval History: Today, the patient reports that physical therapy has been very beneficial. He has been strictly using the post-op shoe. He is now a 4/10 for pain, where he was previously 8/10.  He does not feel confident he has returning to work without restriction.  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  He is hoping for continued light restrictions for an additional 3 weeks, while he works on physical therapy and continues to heal.       Negative x-ray of the foot 5/24/2022 with no bony abnormality involving the great toe.      Images reviewed by me:  XR FOOT LEFT G/E 3 VIEWS 5/24/2022 1:49 PM      HISTORY: pain over great metatarsal bone. Injured while climbing  ladder x 1.5 weeks; Foot injury, right, initial encounter     COMPARISON: None.                                                                       IMPRESSION: Normal joint spaces and alignment. No evidence of a  fracture, with attention to the first metatarsal.     KATIE UMANZOR MD       PMH:  Active problem list:  Patient Active Problem List   Diagnosis     Dysthymic Disorder     Traumatic Urethral Stricture     Fatigue     Tachycardia     Unspecified epilepsy without mention of intractable epilepsy     Fainting     Headache     Hematuria, unspecified     Urethral stricture unspecified       FH:  No family history on file.    SH:  Social History     Socioeconomic  History     Marital status: Single     Spouse name: Not on file     Number of children: Not on file     Years of education: Not on file     Highest education level: Not on file   Occupational History     Not on file   Tobacco Use     Smoking status: Current Some Day Smoker     Smokeless tobacco: Never Used   Substance and Sexual Activity     Alcohol use: Yes     Drug use: Never     Sexual activity: Not on file   Other Topics Concern     Not on file   Social History Narrative     Not on file     Social Determinants of Health     Financial Resource Strain: Not on file   Food Insecurity: Not on file   Transportation Needs: Not on file   Physical Activity: Not on file   Stress: Not on file   Social Connections: Not on file   Intimate Partner Violence: Not on file   Housing Stability: Not on file       MEDS:  See EMR, reviewed  ALL:  See EMR, reviewed    REVIEW OF SYSTEMS:  CONSTITUTIONAL:NEGATIVE for fever, chills, change in weight  INTEGUMENTARY/SKIN: NEGATIVE for worrisome rashes, moles or lesions  EYES: NEGATIVE for vision changes or irritation  ENT/MOUTH: NEGATIVE for ear, mouth and throat problems  RESP:NEGATIVE for significant cough or SOB  BREAST: NEGATIVE for masses, tenderness or discharge  CV: NEGATIVE for chest pain, palpitations or peripheral edema  GI: NEGATIVE for nausea, abdominal pain, heartburn, or change in bowel habits  :NEGATIVE for frequency, dysuria, or hematuria  :NEGATIVE for frequency, dysuria, or hematuria  NEURO: NEGATIVE for weakness, dizziness or paresthesias  ENDOCRINE: NEGATIVE for temperature intolerance, skin/hair changes  HEME/ALLERGY/IMMUNE: NEGATIVE for bleeding problems  PSYCHIATRIC: NEGATIVE for changes in mood or affect        Objective: His exam is improved today as I do not find dorsal tenderness and he tolerates forced flexion and extension of the great toe MTP without significant discomfort.  He is nontender over the medial or lateral sesamoid.  He can flex and extend the  toe against resistance with good strength.  Appropriate conversation affect.    Assessment great toe turf toe injury, improving    Plan: We went over the option of progressing to a half steel toe insert, which she could purchase online.  Work restrictions listed for the next 3 weeks.  Follow-up in 3 weeks.                                  Again, thank you for allowing me to participate in the care of your patient.      Sincerely,    Gallito Bonilla MD

## 2022-07-08 ENCOUNTER — MYC MEDICAL ADVICE (OUTPATIENT)
Dept: ORTHOPEDICS | Facility: CLINIC | Age: 32
End: 2022-07-08

## 2022-07-08 NOTE — TELEPHONE ENCOUNTER
Spoke to Ines Moreno RN after her discussion with the patient. Patient plans to MyChart us after visiting [facility], we will review, but also route this message to Ines Moreno RN so she may Follow-up with patient if needed. Phuong Pompa, ATC on 7/8/2022 at 4:29 PM

## 2022-07-08 NOTE — CONFIDENTIAL NOTE
Called and talked with patient regarding his MyCharts from this morning. Discussed with patient about his mental health currently, he expressed his frustration with trying to get into the mental health system with access to medications. He has tried to have a couple of appointments, but has been told those providers don't see patients for his age or diagnosis.   Discussed with patient about [facility] at [facility]. Patient was interested in this resource and will go there today to see if he can get help with his anxiety. He was also wondering about anexoria help, let him know that the staff at [facility] would have the most resources on who he should see for that. Patient is currently not having any thoughts of hurting himself or others. Patient expressed understanding of the resource and all questions were answered.  Ines Moreno RN

## 2022-07-17 ENCOUNTER — HEALTH MAINTENANCE LETTER (OUTPATIENT)
Age: 32
End: 2022-07-17

## 2022-07-20 ENCOUNTER — TELEPHONE (OUTPATIENT)
Dept: ORTHOPEDICS | Facility: CLINIC | Age: 32
End: 2022-07-20

## 2022-07-21 ENCOUNTER — OFFICE VISIT (OUTPATIENT)
Dept: ORTHOPEDICS | Facility: CLINIC | Age: 32
End: 2022-07-21
Payer: OTHER MISCELLANEOUS

## 2022-07-21 VITALS
DIASTOLIC BLOOD PRESSURE: 82 MMHG | WEIGHT: 164 LBS | SYSTOLIC BLOOD PRESSURE: 118 MMHG | HEIGHT: 75 IN | BODY MASS INDEX: 20.39 KG/M2

## 2022-07-21 DIAGNOSIS — M79.672 ACUTE PAIN OF LEFT FOOT: ICD-10-CM

## 2022-07-21 DIAGNOSIS — S93.522A TURF TOE OF LEFT FOOT: Primary | ICD-10-CM
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
## 2022-07-21 PROCEDURE — 99214 OFFICE O/P EST MOD 30 MIN: CPT | Performed by: STUDENT IN AN ORGANIZED HEALTH CARE EDUCATION/TRAINING PROGRAM

## 2022-07-21 RX ORDER — DICLOFENAC SODIUM 75 MG/1
75 TABLET, DELAYED RELEASE ORAL 2 TIMES DAILY
Qty: 28 TABLET | Refills: 0 | Status: SHIPPED | OUTPATIENT
Start: 2022-07-21 | End: 2022-08-22

## 2022-07-21 NOTE — LETTER
2022    Victor Manuel Saavedra  [address] [place] [address]  [phone] (home)     :     1990      To Whom it May Concern:    This patient was seen today in clinic for his left great toe injury that occurred 2022. For the next 2.5 weeks, from 2022 to 2022, he will remain on light duty.  He will be at a seated job with minimal ambulation, and will not be squatting, climbing, or  lifting greater than 20 pounds.  He will continue with his toe immobilization device, and will see physical therapy.  Follow-up in 2.5 weeks for reevaluation of work restrictions.      Sincerely,        Elise More MD, [facility]

## 2022-07-21 NOTE — LETTER
7/21/2022         RE: Victor Manuel Saavedra  [facility] [place] [address]        Dear Colleague,    Thank you for referring your patient, Victor Manuel Saavedra, to the [facility] [place]. Please see a copy of my visit note below.    ASSESSMENT & PLAN    1. Turf toe of left foot    2. Acute pain of left foot    3. Bone stress injury, left 1st metatarsal      Presenting for evaluation of left toe pain (Work Comp injury) for which he has been following with Dr. Debby Bonilla. Initially diagnosed with turf toe, which has improved with conservative management (rest, PT, NSAIDS, ice, post-op shoe). However, noting significant worsening of pain of the mid-distal 1st metatarsal concerning for bone stress injury. Recommend proceeding with MRI for further evaluation.     - Your left foot MRI has been ordered. You may call [phone] to schedule.   - In the meantime, trial of short pneumatic walking boot   - Weightbearing as tolerated in walking boot until MRI results are reviewed  - Continue relative rest, icing for 10-15 minutes 3-4 times per day, elevation and compression as needed  - Rx sent for diclofenac 75 mg tabs -- take 1 tab with food every 12 hours for the next 7-14 days, then as needed.  - Ok to also take Tylenol 1000 mg up to 3 times per day.  - Work letter provided extending light duty restrictions until follow up in 2 weeks.  - Follow up as scheduled with Dr. Bonilla in 2 weeks for MRI review and further management. Return sooner as needed if pain worsens.     You may call our direct clinic number ([phone]) at any time with questions or concerns.    Elise More MD, [facility]    -----    SUBJECTIVE:  Victor Manuel Saavedra is a 32 year old male who is seen in follow-up for turf toe of left foot. They were last seen by Dr. Bonilla on 6/29/22.     Since their last visit reports worsening pain in left toe and foot. They indicate  "that their current pain level is 6-7/10. They have tried rest/activity avoidance, ibuprofen 800mg PRN, Tylenol 1000mg PRN, xray 5/24/22, work restrictions - light duty, physical therapy (3 visits), home exercises, post-op shoe.      The patient is seen by themselves.    Patient's past medical, surgical, social, and family histories were reviewed today and no changes are noted.    REVIEW OF SYSTEMS:  Constitutional: NEGATIVE for fever, chills, change in weight  Skin: NEGATIVE for worrisome rashes, moles or lesions  GI/: NEGATIVE for bowel or bladder changes  Neuro: NEGATIVE for weakness, dizziness or paresthesias    OBJECTIVE:  /82   Ht 1.905 m (6' 3\")   Wt 74.4 kg (164 lb)   BMI 20.50 kg/m     General: healthy, alert and in no distress  HEENT: no scleral icterus or conjunctival erythema  Skin: no suspicious lesions or rash. No jaundice.  CV: regular rhythm by palpation, no pedal edema  Resp: normal respiratory effort without conversational dyspnea   Psych: normal mood and affect  Gait: antlagic gait in post op shoe with appropriate coordination and balance  Neuro: normal light touch sensory exam of the extremities.    MSK:  LEFT FOOT  Inspection:    No swelling, ecchymosis or deformity  Palpation:    Tender about the mid to distal 1st metatarsal. Minimal tenderness about the dorsal and plantar aspects of the 1st MCP joint. Minimal tenderness about the proximal 1st metatarsal.    No tenderness over the medial and lateral sesamoid, Lisfranc joint, navicular or others tarsal.   Range of Motion:     Active great toe flexion and extension  - slightly limited by stiffness    He tolerates forced flexion and extension of the great toe MTP without significant discomfort.    Active and passive ankle range of motion - wnl  Strength:    Intrinsic foot and ankle musculature strength  Special Tests:    Significant pain with axial loading of the 1st metatarsal, minimal pain with MTP stress    Independent visualization " of the below image:  Results for orders placed or performed in visit on 05/24/22   XR Foot Left G/E 3 Views    Narrative    XR FOOT LEFT G/E 3 VIEWS 5/24/2022 1:49 PM     HISTORY: pain over great metatarsal bone. Injured while climbing  ladder x 1.5 weeks; Foot injury, right, initial encounter    COMPARISON: None.       Impression    IMPRESSION: Normal joint spaces and alignment. No evidence of a  fracture, with attention to the first metatarsal.    MD Elise HWANG MD, [facility]              Again, thank you for allowing me to participate in the care of your patient.        Sincerely,        Elise More MD

## 2022-07-21 NOTE — PATIENT INSTRUCTIONS
1. Turf toe of left foot    2. Acute pain of left foot    3. Bone stress injury, left 1st metatarsal      Presenting for evaluation of left toe pain (Work Comp injury) for which he has been following with Dr. Debby Bonilla. Initially diagnosed with turf toe, which has improved with conservative management (rest, PT, NSAIDS, ice, post-op shoe). However, noting significant worsening of pain of the mid 1st metatarsal concerning for bone stress injury. Recommend proceeding with MRI for further evaluation.     - Your left foot MRI has been ordered. You may call [phone] to schedule.   - In the meantime, trial of short pneumatic walking boot   - Weightbearing as tolerated in walking boot until MRI results are reviewed  - Continue relative rest, icing for 10-15 minutes 3-4 times per day, elevation and compression as needed  - Rx sent for diclofenac 75 mg tabs -- take 1 tab with food every 12 hours for the next 7-14 days, then as needed.  - Ok to also take Tylenol 1000 mg up to 3 times per day.  - Work letter provided extending light duty restrictions until follow up in 2 weeks.  - Follow up as scheduled with Dr. Bonilla in 2 weeks for MRI review and further management. Return sooner as needed if pain worsens.     You may call our direct clinic number ([phone]) at any time with questions or concerns.    Elise More MD, [facility]

## 2022-07-21 NOTE — PROGRESS NOTES
ASSESSMENT & PLAN    1. Turf toe of left foot    2. Acute pain of left foot    3. Bone stress injury, left 1st metatarsal      Presenting for evaluation of left toe pain (Work Comp injury) for which he has been following with Dr. Debby Bonilla. Initially diagnosed with turf toe, which has improved with conservative management (rest, PT, NSAIDS, ice, post-op shoe). However, noting significant worsening of pain of the mid-distal 1st metatarsal concerning for bone stress injury. Recommend proceeding with MRI for further evaluation.     - Your left foot MRI has been ordered. You may call [phone] to schedule.   - In the meantime, trial of short pneumatic walking boot   - Weightbearing as tolerated in walking boot until MRI results are reviewed  - Continue relative rest, icing for 10-15 minutes 3-4 times per day, elevation and compression as needed  - Rx sent for diclofenac 75 mg tabs -- take 1 tab with food every 12 hours for the next 7-14 days, then as needed.  - Ok to also take Tylenol 1000 mg up to 3 times per day.  - Work letter provided extending light duty restrictions until follow up in 2 weeks.  - Follow up as scheduled with Dr. Bonilla in 2 weeks for MRI review and further management. Return sooner as needed if pain worsens.     You may call our direct clinic number ([phone]) at any time with questions or concerns.    Elise More MD, [facility]    -----    SUBJECTIVE:  Victor Manuel Saavedra is a 32 year old male who is seen in follow-up for turf toe of left foot. They were last seen by Dr. Bonilla on 6/29/22.     Since their last visit reports worsening pain in left toe and foot. They indicate that their current pain level is 6-7/10. They have tried rest/activity avoidance, ibuprofen 800mg PRN, Tylenol 1000mg PRN, xray 5/24/22, work restrictions - light duty, physical therapy (3 visits), home exercises, post-op shoe.      The patient is seen by  "themselves.    Patient's past medical, surgical, social, and family histories were reviewed today and no changes are noted.    REVIEW OF SYSTEMS:  Constitutional: NEGATIVE for fever, chills, change in weight  Skin: NEGATIVE for worrisome rashes, moles or lesions  GI/: NEGATIVE for bowel or bladder changes  Neuro: NEGATIVE for weakness, dizziness or paresthesias    OBJECTIVE:  /82   Ht 1.905 m (6' 3\")   Wt 74.4 kg (164 lb)   BMI 20.50 kg/m     General: healthy, alert and in no distress  HEENT: no scleral icterus or conjunctival erythema  Skin: no suspicious lesions or rash. No jaundice.  CV: regular rhythm by palpation, no pedal edema  Resp: normal respiratory effort without conversational dyspnea   Psych: normal mood and affect  Gait: antlagic gait in post op shoe with appropriate coordination and balance  Neuro: normal light touch sensory exam of the extremities.    MSK:  LEFT FOOT  Inspection:    No swelling, ecchymosis or deformity  Palpation:    Tender about the mid to distal 1st metatarsal. Minimal tenderness about the dorsal and plantar aspects of the 1st MCP joint. Minimal tenderness about the proximal 1st metatarsal.    No tenderness over the medial and lateral sesamoid, Lisfranc joint, navicular or others tarsal.   Range of Motion:     Active great toe flexion and extension  - slightly limited by stiffness    He tolerates forced flexion and extension of the great toe MTP without significant discomfort.    Active and passive ankle range of motion - wnl  Strength:    Intrinsic foot and ankle musculature strength  Special Tests:    Significant pain with axial loading of the 1st metatarsal, minimal pain with MTP stress    Independent visualization of the below image:  Results for orders placed or performed in visit on 05/24/22   XR Foot Left G/E 3 Views    Narrative    XR FOOT LEFT G/E 3 VIEWS 5/24/2022 1:49 PM     HISTORY: pain over great metatarsal bone. Injured while climbing  ladder x 1.5 weeks; " Foot injury, right, initial encounter    COMPARISON: None.       Impression    IMPRESSION: Normal joint spaces and alignment. No evidence of a  fracture, with attention to the first metatarsal.    MD Elise HWANG MD, [facility]

## 2022-07-22 ENCOUNTER — HOSPITAL ENCOUNTER (EMERGENCY)
Facility: CLINIC | Age: 32
Discharge: HOME | End: 2022-07-22
Attending: EMERGENCY MEDICINE | Admitting: EMERGENCY MEDICINE
Payer: COMMERCIAL

## 2022-07-22 VITALS
HEART RATE: 89 BPM | HEIGHT: 75 IN | BODY MASS INDEX: 18.77 KG/M2 | OXYGEN SATURATION: 99 % | WEIGHT: 151 LBS | RESPIRATION RATE: 18 BRPM | DIASTOLIC BLOOD PRESSURE: 84 MMHG | TEMPERATURE: 97.3 F | SYSTOLIC BLOOD PRESSURE: 122 MMHG

## 2022-07-22 DIAGNOSIS — F41.9 ANXIETY: ICD-10-CM

## 2022-07-22 DIAGNOSIS — F32.A DEPRESSION, UNSPECIFIED DEPRESSION TYPE: ICD-10-CM

## 2022-07-22 DIAGNOSIS — G47.00 INSOMNIA, UNSPECIFIED TYPE: ICD-10-CM

## 2022-07-22 DIAGNOSIS — F90.9 ATTENTION DEFICIT HYPERACTIVITY DISORDER (ADHD), UNSPECIFIED ADHD TYPE: ICD-10-CM

## 2022-07-22 LAB — SARS-COV-2 RNA RESP QL NAA+PROBE: NEGATIVE

## 2022-07-22 PROCEDURE — C9803 HOPD COVID-19 SPEC COLLECT: HCPCS

## 2022-07-22 PROCEDURE — 99285 EMERGENCY DEPT VISIT HI MDM: CPT | Mod: 25,CS

## 2022-07-22 PROCEDURE — 90791 PSYCH DIAGNOSTIC EVALUATION: CPT

## 2022-07-22 PROCEDURE — U0003 INFECTIOUS AGENT DETECTION BY NUCLEIC ACID (DNA OR RNA); SEVERE ACUTE RESPIRATORY SYNDROME CORONAVIRUS 2 (SARS-COV-2) (CORONAVIRUS DISEASE [COVID-19]), AMPLIFIED PROBE TECHNIQUE, MAKING USE OF HIGH THROUGHPUT TECHNOLOGIES AS DESCRIBED BY CMS-2020-01-R: HCPCS | Performed by: EMERGENCY MEDICINE

## 2022-07-22 PROCEDURE — 99220 PR INITIAL OBSERVATION CARE,LEVEL III: CPT | Mod: 25 | Performed by: NURSE PRACTITIONER

## 2022-07-22 RX ORDER — HYDROXYZINE HYDROCHLORIDE 25 MG/1
25-50 TABLET, FILM COATED ORAL EVERY 6 HOURS PRN
Qty: 30 TABLET | Refills: 0 | Status: SHIPPED | OUTPATIENT
Start: 2022-07-22 | End: 2022-09-16

## 2022-07-22 RX ORDER — ESCITALOPRAM OXALATE 10 MG/1
TABLET ORAL
Qty: 27 TABLET | Refills: 0 | Status: SHIPPED | OUTPATIENT
Start: 2022-07-22 | End: 2022-09-16

## 2022-07-22 RX ORDER — MIRTAZAPINE 15 MG/1
15 TABLET, FILM COATED ORAL AT BEDTIME
Qty: 30 TABLET | Refills: 0 | Status: SHIPPED | OUTPATIENT
Start: 2022-07-22 | End: 2022-09-16

## 2022-07-22 ASSESSMENT — ENCOUNTER SYMPTOMS
APPETITE CHANGE: 1
ACTIVITY CHANGE: 1
SLEEP DISTURBANCE: 1
NERVOUS/ANXIOUS: 1
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
## 2022-07-22 NOTE — Clinical Note
Victor Manuel Saavedra was seen and treated in our emergency department on 7/22/2022.         Sincerely,     [facility]

## 2022-07-22 NOTE — DISCHARGE INSTRUCTIONS
You have been referred to virtual therapy appointment with : Yoly Zendejas MA [facility]  [address] [phone]/26/2022 5:00 pm.   Please review your e-mail for instructions on how to connect to this video visit.  You have been for medication management appointment, virtual appointment with:  Donna Roach MA, PA-C [facility] [address], [phone]/22/2022 1:30 pm.  Please refer to your email for instructions on how to connect to this video visit.   Since your medication management appointment is more than 2 business days, we are referring you to [facility], a service that provides bridging appointments between now and your future appointment.  Someone from this clinic will contact you to set up an bridging medication management appointment to check up on you with the medications we start for your today.  This will be only a bridging appointment and the person named above will be your ongoing medication prescriber.  Return to the Emergency Department if things become worse.   ______________________________________________________________________________________________________________________________________________________________________________________    Aftercare Plan  If I am feeling unsafe or I am in a crisis, I will:   Contact my established care providers   Call the National Suicide Prevention Lifeline: 988  Go to the nearest emergency room   Call 911     Warning signs that I or other people might notice when a crisis is developing for me: 1. When I feel anxious  2. When I feel depressed    Things I am able to do on my own to cope or help me feel better:   1. Reading   2. Walking    Things that I am able to do with others to cope or help me better:   1. Reading     Things I can use or do for distraction:   1. Reading  2. Walking     Changes I can make to support my mental health and wellness:   1. Start medication  2.  "Start therapy     People in my life that I can ask for help:   1. My mother     Your county has a mental health crisis team you can call 24/7: [facility] Mobile Crisis  [phone] (adults)  [phone] (children)    Other things that are important when I'm in crisis:      Additional resources and information:         Crisis Lines  Crisis Text Line  Text 156557  You will be connected with a trained live crisis counselor to provide support.    Por espanol, texto  MARISSA a 187452 o texto a 442-AYUDAME en WhatsApp    The [employer] Project (LGBTQ Youth Crisis Line)  5.140.679.3069  text START to 108-445      [employer]  Fast Tracker  Linking people to mental health and substance use disorder resources  Match Capital.Shweeb     [place] Mental Health Warm Line  Peer to peer support  Monday thru Saturday, 12 pm to 10 pm  042.572.5361 or 3.524.838.0637  Text \"Support\" to 48398    National [place] on Mental Illness (NAV)  990.256.6637 or 1.888.NAV.HELPS      Mental Health Apps  My3  https://myFirstFuel Softwarepp.org/    VirtualHopeBox  https://Yorder.org/apps/virtual-hope-box/      Additional Information  Today you were seen by a licensed mental health professional through Triage and Transition services, [facility] ([facility])  for a crisis assessment in the Emergency Department at [facility].  It is recommended that you follow up with your established providers (psychiatrist, mental health therapist, and/or primary care doctor - as relevant) as soon as possible. Coordinators from [facility] will be calling you in the next 24-48 hours to ensure that you have the resources you need.  You can also contact [facility] coordinators directly at [phone]. You may have been scheduled for or offered an appointment with a mental health provider. [facility] maintains an extensive network of licensed behavioral health providers to connect patients with the services they need.  We do not charge providers a fee to " participate in our referral network.  We match patients with providers based on a patient's specific needs, insurance coverage, and location.  Our first effort will be to refer you to a provider within your care system, and will utilize providers outside your care system as needed.

## 2022-07-22 NOTE — ED NOTES
Pt brought to [facility] with increased anxiety. Pt is feeling increasingly more anxious as pt has been struggling with eating. Pt states he has trouble wanting to eat although does feel hungry. Pt states his body doesn't want to eat and he just can take food in. Pt reports that since he has been struggling to eat pt is having difficulty sleeping and has abiel having increased anxiety. Pt is wanting to get some help with his anxiety so that he can feel better about eating. Pt states he has been drinking muscle milk to help maintain weight. Pt denies any MH hx and has only had one appointment with a psychiatrist several years ago. Pt is fairly superficial with staff but pleasant and cooperative. Pt denies any SI. Plan for pt to meet with therapist and psychiatrist. Nursing and risk assessments completed.  Assessments reviewed with LMHP and physician. Video monitoring in progress, patient informed.  Admission information reviewed with patient. Patient given a tour of [facility] and instructions on using the facility. Questions regarding [facility] addressed. Pt search completed and belongings inventoried.

## 2022-07-22 NOTE — ED PROVIDER NOTES
"  History   Chief Complaint:  Mental Health Problem       The history is provided by the patient.      Victor Manuel Saavedra is a 32 year old male with history of anxiety and depression who presents with a mental health problem. He states that he wants help managing his anorexia and anxiety. He currently does not take medications or goes to therapy. He states that he is unable to sleep or work. His anxiety and anorexia has worsened these past 2 months. He is feeling sad but denies suicidal ideation. He states his anorexia is not because of his body image but because he has no appetite for food.  He has chronic L foot/toe pain that he is seeing a specialist for.      Review of Systems   Constitutional: Positive for activity change and appetite change.   Psychiatric/Behavioral: Positive for sleep disturbance. Negative for suicidal ideas. The patient is nervous/anxious.    All other systems reviewed and are negative.    Allergies:  No known allergies    Medications:  Cholecalciferol  Diclofenac  Multivitamin    Past Medical History:     Traumatic urethral structure  Epilepsy  Vitamin D deficiency  generalized anxiety disorder  Tobacco use disorder  Depression    Past Surgical History:    No past surgical history on file.     Family History:    Father: anxiety, depression  Mother: anxiety, depression  Siblings: anxiety x3, depression x3, thyroid disorder    Social History:  The patient presents to the ED alone. He arrived to the ED via car.    Physical Exam     Patient Vitals for the past 24 hrs:   BP Temp Temp src Pulse Resp SpO2 Height Weight   07/22/22 1408 122/84 97.3  F (36.3  C) Oral 89 18 99 % 1.905 m (6' 3\") 68.5 kg (151 lb)   07/22/22 1228 119/78 97.9  F (36.6  C) Temporal 89 16 100 % 1.905 m (6' 3\") 74.8 kg (165 lb)       Physical Exam  Vitals and nursing note reviewed.   Constitutional:       General: He is not in acute distress.     Appearance: Normal appearance. He is not ill-appearing.   HENT:      Head: " Normocephalic and atraumatic.      Right Ear: External ear normal.      Left Ear: External ear normal.   Eyes:      Conjunctiva/sclera: Conjunctivae normal.   Cardiovascular:      Rate and Rhythm: Normal rate and regular rhythm.      Heart sounds: No murmur heard.  Pulmonary:      Effort: Pulmonary effort is normal. No respiratory distress.      Breath sounds: No wheezing, rhonchi or rales.   Abdominal:      General: Abdomen is flat. There is no distension.      Palpations: Abdomen is soft.      Tenderness: There is no abdominal tenderness. There is no guarding or rebound.   Musculoskeletal:      Cervical back: Normal range of motion and neck supple.      Comments: L foot with walking boot in place   Skin:     General: Skin is warm and dry.      Findings: No rash.   Neurological:      Mental Status: He is alert and oriented to person, place, and time.   Psychiatric:         Mood and Affect: Mood is anxious.         Behavior: Behavior normal.         Thought Content: Thought content does not include suicidal ideation.           Emergency Department Course     Laboratory:  Labs Ordered and Resulted from Time of ED Arrival to Time of ED Departure   COVID-19 VIRUS (CORONAVIRUS) BY PCR - Normal       Result Value    SARS CoV2 PCR Negative          Emergency Department Course:     Reviewed:  I reviewed nursing notes, vitals, past medical history and Care Everywhere    Assessments:  1257 I obtained history and examined the patient as noted above.     Disposition:  The patient was transferred to [facility].     Impression & Plan     Medical Decision Makin-year-old male presenting with anxiety and anorexia.  He is not agitated or psychotic.  He is not suicidal.  No signs of any medical conditions precluding him from psych/[facility] evaluation.  His COVID is negative.  We will send to [facility] for further mental health care.        Diagnosis:    ICD-10-CM    1. Anxiety  F41.9    2. Anorexia  R63.0        Scribe Disclosure:  I,  Matt Lambert, am serving as a scribe at 12:43 PM on 7/22/2022 to document services personally performed by Jacinto Gandhi MD based on my observations and the provider's statements to me.        Jacinto Gandhi MD  07/22/22 9482

## 2022-07-23 ENCOUNTER — TELEPHONE (OUTPATIENT)
Dept: BEHAVIORAL HEALTH | Facility: CLINIC | Age: 32
End: 2022-07-23

## 2022-07-23 NOTE — PROGRESS NOTES
Patient agreeable to discharge plan. Discharge instructions reviewed with patient including follow-up care plan. Reviewed safety plan and outpatient resources. Denies SI and HI. All belongings that were brought into the hospital have been returned to patient. Escorted off the unit at 2035 accompanied by [facility] staff. Discharged to home via self.

## 2022-07-23 NOTE — ED PROVIDER NOTES
[facility] Unit - Psychiatric Consultation  [facility] Emergency Department    Victor Manuel Saavedra MRN: [id number]   Age: 32 year old YOB: 1990     History     Chief Complaint   Patient presents with     Mental Health Problem     HPI  Victor Manuel Saavedra is a 32 year old male with history notable for ADHD, anxiety and depression who presents to the ED with worsening symptoms of anxiety.  Patient was evaluated by the ED provider, who medically cleared patient to transfer to [facility] for psychiatric assessment, this is reviewed along with all pertinent labs and tests performed.    On examination, patient describes difficulty with sleep and poor appetite related to his heightened anxiety level worsening over the past two months.  He states he has racing thoughts, difficulty focusing and concentrating. He has noticed a low mood and lack of energy. He identifies several life stressors which may be contributing to his symptoms. He and his girlfriend have broken up. He is healing from a work injury. He currently is not taking any medications to manage the symptoms. He reports being prescribed a stimulant for his ADHD and trazodone for insomnia during high school. He denies suicidal thoughts or signs of symptoms of domi or bipolar depression. He is seeking medication intervention to manage the anxiety symptoms and improve his sleep and overall functioning.    Past Medical History  No past medical history on file.  No past surgical history on file.  cholecalciferol, vitamin D3, (CHOLECALCIFEROL) 400 unit Tab  diclofenac (VOLTAREN) 75 MG EC tablet  escitalopram (LEXAPRO) 10 MG tablet  hydrOXYzine (ATARAX) 25 MG tablet  mirtazapine (REMERON) 15 MG tablet  multivitamin (MULTIVITAMIN) per tablet      No Known Allergies  Family History  No family history on file.  Social History   Social History     Tobacco Use     Smoking status: Current Some Day Smoker     Smokeless tobacco: Never Used   Substance Use Topics     Alcohol use: Yes  "    Drug use: Never      Past medical history, past surgical history, medications, allergies, family history, and social history were reviewed with the patient. No additional pertinent items.       Review of Systems  A complete review of systems was performed with pertinent positives and negatives noted in the HPI, and all other systems negative.    Physical Examination   BP: 119/78  Pulse: 89  Temp: 97.9  F (36.6  C)  Resp: 16  Height: 190.5 cm (6' 3\")  Weight: 74.8 kg (165 lb)  SpO2: 100 %    Physical Exam  General: Appears stated age.   Neuro: Alert and fully oriented. Extremities appear to demonstrate normal strength on visual inspection.   Integumentary/Skin: no rash visualized, normal color    Psychiatric Examination   Appearance: awake, alert  Attitude:  cooperative  Eye Contact:  good  Mood:  anxious and depressed  Affect:  appropriate and in normal range  Speech:  clear, coherent  Psychomotor Behavior:  no evidence of tardive dyskinesia, dystonia, or tics  Thought Process:  logical, linear and goal oriented  Associations:  no loose associations  Thought Content:  no evidence of suicidal ideation or homicidal ideation and no evidence of psychotic thought  Insight:  good  Judgement:  intact  Oriented to:  time, person, and place  Attention Span and Concentration:  intact  Recent and Remote Memory:  intact  Language: able to name/identify objects without impairment  Fund of Knowledge: intact with awareness of current and past events    ED Course        Labs Ordered and Resulted from Time of ED Arrival to Time of ED Departure   COVID-19 VIRUS (CORONAVIRUS) BY PCR - Normal       Result Value    SARS CoV2 PCR Negative         Assessments & Plan (with Medical Decision Making)   Patient presenting with worsening symptoms of anxiety over the past two months finding typical coping skills have not been as effective at managing his symptoms. Appetite is poor with a weight loss and his sleep pattern is disruptive, " negatively impacting his overall level of functioning.    Nursing notes reviewed noting no acute issues.     I have reviewed the assessment completed by the [facility].     After a period of working with the treatment team on the [facility] unit, the patient's mental state improved to allow a safe transition to outpatient care. After counseling on the diagnosis, work-up, and treatment plan, the patient was discharged. Close follow-up with a psychiatrist and/or therapist was recommended and community psychiatric resources were provided. Patient is to return to the ED if any urgent or potentially life-threatening concerns.     At the time of discharge, the patient's acute suicide risk was determined to be low due to the following factors: Reduction in the intensity of mood/anxiety symptoms that preceded the admission, denial of suicidal thoughts, denies feeling helpless or helpless, not currently under the influence of alcohol or illicit substances, denies experiencing command hallucinations, no immediate access to firearms. The patient's acute risk could be higher if noncompliant with their treatment plan, medications, follow-up appointments or using illicit substances or alcohol. Protective factors include: social supports, stable housing, employment.    Preliminary diagnosis:    ICD-10-CM    1. Anxiety  F41.9    2. Depression, unspecified depression type  F32.A    3. Attention deficit hyperactivity disorder (ADHD), unspecified ADHD type  F90.9    4. Insomnia, unspecified type  G47.00         Treatment Plan:  -Discharge home with safety plan.  -Referral for individual therapy.  -Referral for psychiatry.  -Start Lexapro 5 mg daily for 7 days and then take 10 mg daily thereafter. Slow titration to ensure tolerability as this is patient's first experience with a selective serotonin reuptake inhibitor to target anxiety and mood.  -Remeron 15 mg nightly to augment Lexapro with added benefit of improving appetite and  sleep.  -hydroxyzine 25-50 mg every 6 hours as needed for anxiety and sleep.  -Medication education provided this visit includes, rationale for medication, importance of compliance, medication interactions, and common side effects. Patient agreeable.  -Educated on sleep hygiene.  -Dicussed diagnosis and treatment plan.  -Problem focused, supportive therapy provided around patients stressors and symptoms related to their diagnosis.  Validated patients emotions and problems solved with patient around stress management and self care strategies. Patient was receptive.      --  LIU Amezcua CNP   [facility] EMERGENCY DEPT  [facility] Unit  7/22/2022      Ginger Dee APRN CNP  07/25/22 0121

## 2022-07-23 NOTE — TELEPHONE ENCOUNTER
Mental Health &Addiction (MH&A)Transition Clinic (TC):     Provides Patient Support While Waiting to Access Programmatic and Outpatient MH&A Care and Provides Select Crisis Assessment Services     NURSING Referral Review  _________________________________________    This RN has reviewed this Medication Management referral to the Transition Clinic and deemed the referral   [x] Appropriate  [] Inappropriate   []Consulting     Based on the following criteria:    Pt has a psychiatric provider (or pending plan) in place for future prescribing: Yes:   Start Date for Next Level of Care: Donna Roach MA, PA-C [facility]  8/22/2022 1:30 pm       Timeframe until pt's scheduled psychiatry appointment is less than 6 months: Yes: ~ 1 month     Pt takes psychiatric medications: Yes: escitalopram (LEXAPRO) 10 MG tablet, hydrOXYzine (ATARAX) 25 MG tablet, mirtazapine (REMERON) 15 MG tablet,     Pt's goals seem to align with this temporary service: Yes: Transition Clinic to bridge psychiatric care and psychiatric medication management until next Level of Care.         Any additional pertinent information regarding this referral: Recent ED Visit 7/22/22 transitioned and discharged from the [facility].  .  Hx of ADHD, STEPHANI, MDD, poor sleep, multiple life stressors including recent breakup with GF, work injury.  Multiple sx of depression. ETOH and tobacco use.         Initial contact w/ patient/parent  TC Coordinator to contact this patient/patients guardian to schedule a New Person Visit with TC Provider Matilde Hare.        The Transition Clinic phone # is [phone].    Antoine Olivier RN on July 25, 2022 at 1:10 PM          Additional Scheduling Instructions for Transition Clinic Coordinator:   TC Coordinators:  This is a medication only  Referral.      TC Coordinatior:  Patient had a recent ED visit 7/22/22.  Patient denies SI and HI.        Please schedule this patient with TC Provider Matilde Hare in  "the next 2-3 weeks or as indicated by the patient.      TC Coordinator, please educate this (patient/ parent/guardian/facility staff member ) as to the purpose and benefit of the TC.      \"The Transition Clinic is a Temporary Service that helps to bridge the time to your next appointment.  It is not intended to be a long-term service and you are expecxted to attend your scheduled appointment with your next provider.      Patient/Parent/ Facility Staff Member verbalized understanding     If you need support between appointments, please call [phone] and let them know you're seen by Transition Clinic Psychiatry.  You may also send a [employer] message to reach us.      RN Signature  Antoine Olivier RN on 7/25/2022 at 1:19 PM                    Writer has fwd medication management referral only to TC RN pool as next level of care set and replied to referral source. Will wait to hear if referral is appropriate or inappropriate.       Venus Serrato  Care Coordinator  7.23  ----- Message from oWn Liriano sent at 7/22/2022  4:02 PM CDT -----  Regarding: Referral  Transition Clinic Referral      Type of Referral:     _____Therapy   _____Therapy & Medication  __X__ Med Mgmt     Referring Provider Name: Lui Charles     Referring Provider Contact Phone Number: [phone]     Reason for Transition Clinic Referral: Med mgmt services prior to med mgmt appt     Next Level of Care Patient Will Be Transitioned To: Outpatient therapy/med mgmt     Start Date for Next Level of Care: Donna Roach MA, PA-C  [facility]   8/22/2022 1:30 pm     Type of Clinical Assessment Completed (Crisis, DA, CAROLINE, etc.): Crisis Assessment     Date Clinical Assessment was Completed: 07/22/2022     Diagnosis:  Major depressive disorder, Recurrent episode, Severe - F33.2, Generalized anxiety disorder - F41.1.     What Would Be Helpful from the Transition Clinic: med mgmt      Needs: n/a     Does Patient Have Access " to Technology: yes     Patient E-mail Address: [email or web address]     Current Patient Phone Number: [phone]     Clinician Gender Preference (if applicable): n/a

## 2022-07-25 ENCOUNTER — TELEPHONE (OUTPATIENT)
Dept: BEHAVIORAL HEALTH | Facility: CLINIC | Age: 32
End: 2022-07-25

## 2022-07-25 NOTE — TELEPHONE ENCOUNTER
Writer gave patient a call for psychiatry scheduling. Patient stated they felt comfortable waiting until 08/22 for their medication management appointment. Writer has marked referral as complete and updated tracker.    Paris Blackwood  07/25/22  128    ----- Message from Antoine Olivier RN sent at 7/25/2022  1:20 PM CDT -----  Regarding: FW: Referral   Mental Health &Addiction (MH&A)Transition Clinic (TC):     Provides Patient Support While Waiting to Access Programmatic and Outpatient MH&A Care and Provides Select Crisis Assessment Services     NURSING Referral Review  _________________________________________    This RN has reviewed this Medication Management referral to the Transition Clinic and deemed the referral    Appropriate  X   Inappropriate   Consulting     Based on the following criteria:    Pt has a psychiatric provider (or pending plan) in place for future prescribing: Yes:   Start Date for Next Level of Care: Donna Roach MA, PA-C [facility]  8/22/2022 1:30 pm       Timeframe until pt's scheduled psychiatry appointment is less than 6 months: Yes: ~ 1 month     Pt takes psychiatric medications: Yes: escitalopram (LEXAPRO) 10 MG tablet, hydrOXYzine (ATARAX) 25 MG tablet, mirtazapine (REMERON) 15 MG tablet,     Pt's goals seem to align with this temporary service: Yes: Transition Clinic to bridge psychiatric care and psychiatric medication management until next Level of Care.         Any additional pertinent information regarding this referral: Recent ED Visit 7/22/22 transitioned and discharged from the [facility].  .  Hx of ADHD, STEPHANI, MDD, poor sleep, multiple life stressors including recent breakup with GF, work injury.  Multiple sx of depression. ETOH and tobacco use.         Initial contact w/ patient/parent  TC Coordinator to contact this patient/patients guardian to schedule a New Person Visit with TC Provider Matilde Hare.        The Transition Clinic phone # is  "[phone].    Antoine Olivier RN on July 25, 2022 at 1:10 PM          Additional Scheduling Instructions for Transition Clinic Coordinator:   TC Coordinators:  This is a medication only  Referral.      TC Coordinatior:  Patient had a recent ED visit 7/22/22.  Patient denies SI and HI.        Please schedule this patient with TC Provider Matilde Hare in the next 2-3 weeks or as indicated by the patient.      TC Coordinator, please educate this (patient/ parent/guardian/facility staff member ) as to the purpose and benefit of the TC.      \"The Transition Clinic is a Temporary Service that helps to bridge the time to your next appointment.  It is not intended to be a long-term service and you are expecxted to attend your scheduled appointment with your next provider.      Patient/Parent/ Facility Staff Member verbalized understanding     If you need support between appointments, please call [phone] and let them know you're seen by Transition Clinic Psychiatry.  You may also send a [employer] message to reach us.      RN Signature  Antoine Olivier RN on 7/25/2022 at 1:19 PM  ----- Message -----  From: LOLY Serrato  Sent: 7/23/2022   9:42 AM CDT  To: Transition Clinic [facility]  Subject: FW: Referral                                       ----- Message -----  From: Won Liriano  Sent: 7/22/2022   4:06 PM CDT  To: Transition Clinic  Subject: Referral                                         Transition Clinic Referral      Type of Referral:     _____Therapy   _____Therapy & Medication  __X__ Med Mgmt     Referring Provider Name: Lui Soto     Referring Provider Contact Phone Number: [phone]     Reason for Transition Clinic Referral: Med mgmt services prior to med mgmt appt     Next Level of Care Patient Will Be Transitioned To: Outpatient therapy/med mgmt     Start Date for Next Level of Care: Donna Roach MA, PA-C  [facility]   8/22/2022 1:30 pm     Type of Clinical Assessment " Completed (Crisis, DA, CAROLINE, etc.): Crisis Assessment     Date Clinical Assessment was Completed: 07/22/2022     Diagnosis:  Major depressive disorder, Recurrent episode, Severe - F33.2, Generalized anxiety disorder - F41.1.     What Would Be Helpful from the Transition Clinic: med mgmt      Needs: n/a     Does Patient Have Access to Technology: yes     Patient E-mail Address: [email or web address]     Current Patient Phone Number: [phone]     Clinician Gender Preference (if applicable): n/a

## 2022-08-02 ENCOUNTER — ANCILLARY PROCEDURE (OUTPATIENT)
Dept: MRI IMAGING | Facility: CLINIC | Age: 32
End: 2022-08-02
Attending: STUDENT IN AN ORGANIZED HEALTH CARE EDUCATION/TRAINING PROGRAM
Payer: OTHER MISCELLANEOUS

## 2022-08-02 DIAGNOSIS — S93.522A TURF TOE OF LEFT FOOT: ICD-10-CM
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
## 2022-08-02 DIAGNOSIS — M79.672 ACUTE PAIN OF LEFT FOOT: ICD-10-CM

## 2022-08-02 PROCEDURE — 73718 MRI LOWER EXTREMITY W/O DYE: CPT | Mod: LT

## 2022-08-04 ENCOUNTER — OFFICE VISIT (OUTPATIENT)
Dept: ORTHOPEDICS | Facility: CLINIC | Age: 32
End: 2022-08-04
Payer: OTHER MISCELLANEOUS

## 2022-08-04 DIAGNOSIS — S93.522A TURF TOE OF LEFT FOOT: Primary | ICD-10-CM

## 2022-08-04 PROCEDURE — 99213 OFFICE O/P EST LOW 20 MIN: CPT | Performed by: FAMILY MEDICINE

## 2022-08-04 NOTE — PROGRESS NOTES
fup acute turf toe injury left great toe x 2.5 months.  Patient has been wearing a cam walker boot.  He has been using diclofenac for pain relief.  He still notes discomfort over the dorsal aspect of the MTP and along the shaft of the first metatarsal.  He has purchased a half steel sole insert.  He has not tried it recently.    Acute left foot injury 5/14/2022     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  As he did so, his left great toe at the MTP was forced into plantarflexion, as the dorsum of the MTP happened to be levered against a cross beam concomitantly.   Has had pain over the dorsum of his large toe MTP since.      Has not seen physical therapy since 6/29/2022.  He previously saw the physical therapist at [facility].    Negative x-ray of the foot 5/24/2022 with no bony abnormality involving the great toe.      Subsequent MRI of the left foot dated 8/2/2022 showed no evidence of underlying fracture, no underlying bone stress injury, no tendon tear or tendinopathy, no synovitis. Lisfranc ligament intact.  Signal artifact versus bone bruising involving the distal phalanx whit of the first through fourth toes.  No other bony abnormality.            EXAM: MR FOOT LEFT W/O CONTRAST  LOCATION: [facility]  DATE/TIME: 8/2/2022 11:17 AM     INDICATION: Left great toe injury, persistent pain despite 2 months of conservative management (post op shoe, NSAIDS, ice, PT, relative rest). Concern for bone stress injury.  COMPARISON: None.  TECHNIQUE: Unenhanced.     FINDINGS:      JOINTS AND BONES:   -There is probably mild marrow edema versus less likely signal artifact involving the distal phalanges of the first through fourth toes, most marked along the distal whit (series 4  images 10, 15, 19, 22). No other bony abnormality. No joint abnormality.   No joint effusion or synovitis.     TENDONS:   -No tendon tear, tendinopathy, or tenosynovitis.      LIGAMENTS:   -Lisfranc ligament: Intact. No subluxation.     MUSCLES AND SOFT TISSUES:   -No muscle atrophy or edema. No soft tissue mass or fluid collection.                                                                      IMPRESSION:  1.  Mild marrow edema involving the distal phalanges of the first through fourth toes. This is nonspecific, acute traumatic or repetitive contusions most likely.  2.  No additional abnormality.            Images reviewed by me:  XR FOOT LEFT G/E 3 VIEWS 5/24/2022 1:49 PM      HISTORY: pain over great metatarsal bone. Injured while climbing  ladder x 1.5 weeks; Foot injury, right, initial encounter     COMPARISON: None.                                                                       IMPRESSION: Normal joint spaces and alignment. No evidence of a  fracture, with attention to the first metatarsal.     KATIE UMANZOR MD       Patient has a history of anxiety, depression, ADHD.  Recently evaluated by mental health provider 7/22/2022.  Started on Lexapro 10 mg daily, Remeron 15 mg nightly with hydroxyzine 25 to 50 mg every 6 hours as needed for anxiety and sleep.  Referred to psychiatry 7/22/22.        PMH:  Active problem list:  Patient Active Problem List   Diagnosis     Dysthymic Disorder     Traumatic Urethral Stricture     Fatigue     Tachycardia     Unspecified epilepsy without mention of intractable epilepsy     Fainting     Headache     Hematuria, unspecified     Urethral stricture unspecified       FH:  No family history on file.    SH:  Social History     Socioeconomic History     Marital status: Single     Spouse name: Not on file     Number of children: Not on file     Years of education: Not on file     Highest education level: Not on file   Occupational History     Not on file   Tobacco Use      Smoking status: Current Some Day Smoker     Smokeless tobacco: Never Used   Substance and Sexual Activity     Alcohol use: Yes     Drug use: Never     Sexual activity: Not on file   Other Topics Concern     Not on file   Social History Narrative     Not on file     Social Determinants of Health     Financial Resource Strain: Not on file   Food Insecurity: Not on file   Transportation Needs: Not on file   Physical Activity: Not on file   Stress: Not on file   Social Connections: Not on file   Intimate Partner Violence: Not on file   Housing Stability: Not on file       MEDS:  See EMR, reviewed  ALL:  See EMR, reviewed    REVIEW OF SYSTEMS:  CONSTITUTIONAL:NEGATIVE for fever, chills, change in weight  INTEGUMENTARY/SKIN: NEGATIVE for worrisome rashes, moles or lesions  EYES: NEGATIVE for vision changes or irritation  ENT/MOUTH: NEGATIVE for ear, mouth and throat problems  RESP:NEGATIVE for significant cough or SOB  BREAST: NEGATIVE for masses, tenderness or discharge  CV: NEGATIVE for chest pain, palpitations or peripheral edema  GI: NEGATIVE for nausea, abdominal pain, heartburn, or change in bowel habits  :NEGATIVE for frequency, dysuria, or hematuria  :NEGATIVE for frequency, dysuria, or hematuria  NEURO: NEGATIVE for weakness, dizziness or paresthesias  ENDOCRINE: NEGATIVE for temperature intolerance, skin/hair changes  HEME/ALLERGY/IMMUNE: NEGATIVE for bleeding problems  PSYCHIATRIC: NEGATIVE for changes in mood or affect    Objective: There is no discoloration over the MTP.  He still notes point discomfort over the dorsum of the MTP and describes tenderness along the shaft of the MTP although I can find no tenderness along the shaft to palpation.  He is nontender over the volar surface of the MTP at the medial or lateral sesamoids.  I can dorsiflex the MTP passively 60 degrees but he is limited by discomfort at the extremes of extension of the joint.  I can achieve full plantar flexion of the MTP of 45  to 50 degrees without discomfort at the extremes of passive plantar flexion.  There is no tenderness in the area of Lisfranc.  He is nontender over the whit of the first, second, third, or fourth phalange ease distally.  The skin overlying the whit of these digits is normal.  Sensation is intact distally.      We reviewed the results of his MRI, with a copy provided to him of the MRI results.    Assessment turf toe left foot    Plan: We reviewed his reassuring MRI results, with no underlying stress reaction of the bone, no fracture, no tendon or ligament tear.  I encouraged him to shift from the cam walker boot to the half steel toed shoe and encouraged him to return to his physical therapist for further treatment of the turf toe, physical therapy referral placed.  He can use either diclofenac or Tylenol to assist with pain.  He indicates that overall he would like to avoid pills for pain control if possible.  He requested a work restriction for the next 4 weeks.  This was provided.  I asked him to be limited to a seated job with minimal ambulation, and that he will  not be be involved with squatting, climbing, or  lifting greater than 20 pounds.

## 2022-08-04 NOTE — LETTER
8/4/2022      RE: Victor Manuel Saavedra  [address] [place] [address]     Dear Colleague,    Thank you for referring your patient, Victor Manuel Saavedra, to the [facility] [place]. Please see a copy of my visit note below.    fup acute turf toe injury left great toe x 2.5 months.  Patient has been wearing a cam walker boot.  He has been using diclofenac for pain relief.  He still notes discomfort over the dorsal aspect of the MTP and along the shaft of the first metatarsal.  He has purchased a half steel sole insert.  He has not tried it recently.    Acute left foot injury 5/14/2022     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  As he did so, his left great toe at the MTP was forced into plantarflexion, as the dorsum of the MTP happened to be levered against a cross beam concomitantly.   Has had pain over the dorsum of his large toe MTP since.      Has not seen physical therapy since 6/29/2022.  He previously saw the physical therapist at [facility].    Negative x-ray of the foot 5/24/2022 with no bony abnormality involving the great toe.      Subsequent MRI of the left foot dated 8/2/2022 showed no evidence of underlying fracture, no underlying bone stress injury, no tendon tear or tendinopathy, no synovitis. Lisfranc ligament intact.  Signal artifact versus bone bruising involving the distal phalanx whit of the first through fourth toes.  No other bony abnormality.            EXAM: MR FOOT LEFT W/O CONTRAST  LOCATION: [facility]  DATE/TIME: 8/2/2022 11:17 AM     INDICATION: Left great toe injury, persistent pain despite 2 months of conservative management (post op shoe, NSAIDS, ice, PT, relative rest). Concern for bone stress  injury.  COMPARISON: None.  TECHNIQUE: Unenhanced.     FINDINGS:      JOINTS AND BONES:   -There is probably mild marrow edema versus less likely signal artifact involving the distal phalanges of the first through fourth toes, most marked along the distal whit (series 4 images 10, 15, 19, 22). No other bony abnormality. No joint abnormality.   No joint effusion or synovitis.     TENDONS:   -No tendon tear, tendinopathy, or tenosynovitis.      LIGAMENTS:   -Lisfranc ligament: Intact. No subluxation.     MUSCLES AND SOFT TISSUES:   -No muscle atrophy or edema. No soft tissue mass or fluid collection.                                                                      IMPRESSION:  1.  Mild marrow edema involving the distal phalanges of the first through fourth toes. This is nonspecific, acute traumatic or repetitive contusions most likely.  2.  No additional abnormality.            Images reviewed by me:  XR FOOT LEFT G/E 3 VIEWS 5/24/2022 1:49 PM      HISTORY: pain over great metatarsal bone. Injured while climbing  ladder x 1.5 weeks; Foot injury, right, initial encounter     COMPARISON: None.                                                                       IMPRESSION: Normal joint spaces and alignment. No evidence of a  fracture, with attention to the first metatarsal.     KATIE UMANZOR MD       Patient has a history of anxiety, depression, ADHD.  Recently evaluated by mental health provider 7/22/2022.  Started on Lexapro 10 mg daily, Remeron 15 mg nightly with hydroxyzine 25 to 50 mg every 6 hours as needed for anxiety and sleep.  Referred to psychiatry 7/22/22.        PMH:  Active problem list:  Patient Active Problem List   Diagnosis     Dysthymic Disorder     Traumatic Urethral Stricture     Fatigue     Tachycardia     Unspecified epilepsy without mention of intractable epilepsy     Fainting     Headache     Hematuria, unspecified     Urethral stricture unspecified       FH:  No family history on  file.    SH:  Social History     Socioeconomic History     Marital status: Single     Spouse name: Not on file     Number of children: Not on file     Years of education: Not on file     Highest education level: Not on file   Occupational History     Not on file   Tobacco Use     Smoking status: Current Some Day Smoker     Smokeless tobacco: Never Used   Substance and Sexual Activity     Alcohol use: Yes     Drug use: Never     Sexual activity: Not on file   Other Topics Concern     Not on file   Social History Narrative     Not on file     Social Determinants of Health     Financial Resource Strain: Not on file   Food Insecurity: Not on file   Transportation Needs: Not on file   Physical Activity: Not on file   Stress: Not on file   Social Connections: Not on file   Intimate Partner Violence: Not on file   Housing Stability: Not on file       MEDS:  See EMR, reviewed  ALL:  See EMR, reviewed    REVIEW OF SYSTEMS:  CONSTITUTIONAL:NEGATIVE for fever, chills, change in weight  INTEGUMENTARY/SKIN: NEGATIVE for worrisome rashes, moles or lesions  EYES: NEGATIVE for vision changes or irritation  ENT/MOUTH: NEGATIVE for ear, mouth and throat problems  RESP:NEGATIVE for significant cough or SOB  BREAST: NEGATIVE for masses, tenderness or discharge  CV: NEGATIVE for chest pain, palpitations or peripheral edema  GI: NEGATIVE for nausea, abdominal pain, heartburn, or change in bowel habits  :NEGATIVE for frequency, dysuria, or hematuria  :NEGATIVE for frequency, dysuria, or hematuria  NEURO: NEGATIVE for weakness, dizziness or paresthesias  ENDOCRINE: NEGATIVE for temperature intolerance, skin/hair changes  HEME/ALLERGY/IMMUNE: NEGATIVE for bleeding problems  PSYCHIATRIC: NEGATIVE for changes in mood or affect    Objective: There is no discoloration over the MTP.  He still notes point discomfort over the dorsum of the MTP and describes tenderness along the shaft of the MTP although I can find no tenderness along the shaft  to palpation.  He is nontender over the volar surface of the MTP at the medial or lateral sesamoids.  I can dorsiflex the MTP passively 60 degrees but he is limited by discomfort at the extremes of extension of the joint.  I can achieve full plantar flexion of the MTP of 45 to 50 degrees without discomfort at the extremes of passive plantar flexion.  There is no tenderness in the area of Lisfranc.  He is nontender over the whit of the first, second, third, or fourth phalange ease distally.  The skin overlying the whit of these digits is normal.  Sensation is intact distally.      We reviewed the results of his MRI, with a copy provided to him of the MRI results.    Assessment turf toe left foot    Plan: We reviewed his reassuring MRI results, with no underlying stress reaction of the bone, no fracture, no tendon or ligament tear.  I encouraged him to shift from the cam walker boot to the half steel toed shoe and encouraged him to return to his physical therapist for further treatment of the turf toe, physical therapy referral placed.  He can use either diclofenac or Tylenol to assist with pain.  He indicates that overall he would like to avoid pills for pain control if possible.  He requested a work restriction for the next 4 weeks.  This was provided.  I asked him to be limited to a seated job with minimal ambulation, and that he will  not be be involved with squatting, climbing, or  lifting greater than 20 pounds.                    Again, thank you for allowing me to participate in the care of your patient.      Sincerely,    Gallito Bonilla MD

## 2022-08-04 NOTE — LETTER
Date:August 5, 2022      Patient was self referred, no letter generated. Do not send.        [facility] Health Information

## 2022-08-04 NOTE — LETTER
2022    Victor Manuel Saavedra  [address] [place] [address]  [phone] (home)     :     1990      To Whom it May Concern:    This patient was seen today in clinic for his left great toe injury that occurred 2022. For the next four weeks, from 2022 to 2022, he will remain on light duty.  He will be at a seated job with minimal ambulation, and will not be squatting, climbing, or  lifting greater than 20 pounds.  He will continue with his CAM boot, and will see physical therapy, and will be advancing to a steel-toe shoe insert.  Follow-up in 4 weeks for reevaluation of work restrictions.      Sincerely,        Gallito Bonilla MD

## 2022-08-05 ENCOUNTER — TELEPHONE (OUTPATIENT)
Dept: ORTHOPEDICS | Facility: CLINIC | Age: 32
End: 2022-08-05

## 2022-08-05 NOTE — TELEPHONE ENCOUNTER
Appointment Request From: Victor Manuel Saavedra     With Provider: Elise More MD [[facility]]     Preferred Date Range: 8/5/2022 - 9/1/2022     Preferred Times: Any Time     Reason for visit: Request an Appointment     Comments:  Dr Bonilla didn t ask me any questions on my healing or recovery, he only came in with test results and told me how my own injury was.      I do not feel he is pursuing his medical ethics of due patient diligence in regards to their well-being versus his preconceived notion of what he assumes their healing to be.      Maybe he s busy, maybe he assumes he s seen the injury before.      Either way I m back to being in anxiety panic without sleep the morning he ordered me back to work. Need 2nd opinion.

## 2022-08-05 NOTE — TELEPHONE ENCOUNTER
Please see the [facility] request below and advise. Patient is requesting a 2nd opinion. Dr. Bonilla' office has not been able to reach him yet to respond to his messages.       KEYONNA Nobles RN

## 2022-08-05 NOTE — TELEPHONE ENCOUNTER
Chart reviewed. Please assist patient in scheduling a 40 minute follow up appointment with me. Ok to offer next available 40 minute appt slot.    Elise More MD, CAQSM  [facility]

## 2022-08-08 ENCOUNTER — OFFICE VISIT (OUTPATIENT)
Dept: FAMILY MEDICINE | Facility: CLINIC | Age: 32
End: 2022-08-08
Payer: OTHER MISCELLANEOUS

## 2022-08-08 VITALS
WEIGHT: 156 LBS | DIASTOLIC BLOOD PRESSURE: 66 MMHG | BODY MASS INDEX: 19.5 KG/M2 | SYSTOLIC BLOOD PRESSURE: 124 MMHG | HEART RATE: 78 BPM

## 2022-08-08 DIAGNOSIS — S99.922D FOOT INJURY, LEFT, SUBSEQUENT ENCOUNTER: Primary | ICD-10-CM

## 2022-08-08 PROCEDURE — 99213 OFFICE O/P EST LOW 20 MIN: CPT | Performed by: NURSE PRACTITIONER

## 2022-08-08 ASSESSMENT — ENCOUNTER SYMPTOMS
ACTIVITY CHANGE: 1
UNEXPECTED WEIGHT CHANGE: 0
DIAPHORESIS: 0
FATIGUE: 0
FEVER: 0
APPETITE CHANGE: 1
MYALGIAS: 1
ARTHRALGIAS: 1
CHILLS: 0

## 2022-08-08 ASSESSMENT — PATIENT HEALTH QUESTIONNAIRE - PHQ9
SUM OF ALL RESPONSES TO PHQ QUESTIONS 1-9: 22
SUM OF ALL RESPONSES TO PHQ QUESTIONS 1-9: 22
10. IF YOU CHECKED OFF ANY PROBLEMS, HOW DIFFICULT HAVE THESE PROBLEMS MADE IT FOR YOU TO DO YOUR WORK, TAKE CARE OF THINGS AT HOME, OR GET ALONG WITH OTHER PEOPLE: EXTREMELY DIFFICULT

## 2022-08-08 NOTE — PROGRESS NOTES
Assessment & Plan     Foot injury, left, subsequent encounter  -Read over podiatry's note and imaging results from his last visit in August.  I agree with that prior providers recommendations that the patient can continue light duty and he can follow-up in 3 weeks with podiatry if his symptoms continue.  I discussed my rationale why I could not write a letter for him to not be on light duty at this time. I use words of encouragement and reassurance with the patient today.   -I discussed conservative management with the patient today.  I discussed ways that he could possibly talk to his manager to change his start times from 7 AM to 8 AM.  I discussed that he could take his diclofenac in the evening with food versus taking it in the morning.  He also can take it twice a day but he has to take it with food.  Discussed that he can take Tylenol 1000 mg 3 times a day.    :[id number]}         Return if symptoms worsen or fail to improve.    [facility]    Federico Rush is a 32 year old, presenting for the following health issues:  Foot Injury (Wants a second opinion for foot injury)      Patient is present today to get a third opinion regarding his prior left metatarsal injury.  His left first metatarsal was injured back on May 13, 2022.  Since this period time he has been experiencing pain in this area that has caused him to have a decreased in appetite, and loss of sleep.  He was seen by a podiatrist in August 2022.  MRI was completed.  The podiatrist told him that he can work light duty for the next 4 weeks.  The patient states that he cannot work doing light duty.  He stated that he wakes up in the middle of the night associated with pain in this area, this will cause him to toss and turn and get an adequate sleep which results in not waking up for his alarms in the morning.  He stated that his light duty now starts at 7 AM where his prior job use to start  at 9 AM.  He feels the 7 AM is way too early.  He stated that he has been written up twice; once for not showing up related to his alarm not going off.  stated that the pain is 24 hours and its constant.  It is located over the first metatarsal, it is sharp, and feels as if something is dislocated in the bone.  There is no swelling or bruising noted.  He stated that he is not hungry related to the pain.  He stated that when he does not eat this is causing a decrease in nutrients which is aiding in not healing the area well.  He stated that he has always had eating disorder.  He used to be in [employer] and they used to give him more rations.  He stated that he is really never been able to put on a lot of weight. He is using a cam boot.  His job for light duty is sitting on a desk.  He has been icing the area off and on, and elevating it.  He takes diclofenac in the morning and not at night.   -He stated that his girlfriend has broken up with him.         Review of Systems   Constitutional: Positive for activity change and appetite change. Negative for chills, diaphoresis, fatigue, fever and unexpected weight change.   Musculoskeletal: Positive for arthralgias and myalgias.   Skin: Negative.             Objective    /66 (BP Location: Right arm, Patient Position: Sitting, Cuff Size: Adult Regular)   Pulse 78   Wt 70.8 kg (156 lb)   BMI 19.50 kg/m    Body mass index is 19.5 kg/m .  Physical Exam  Constitutional:       Appearance: Normal appearance.   Pulmonary:      Effort: Pulmonary effort is normal.   Musculoskeletal:         General: No swelling.        Feet:    Feet:      Right foot:      Skin integrity: Skin integrity normal.      Toenail Condition: Right toenails are normal.      Left foot:      Skin integrity: Skin integrity normal.      Toenail Condition: Left toenails are normal.   Skin:     General: Skin is warm and dry.      Capillary Refill: Capillary refill takes less than 2 seconds.   Neurological:       General: No focal deficit present.      Mental Status: He is alert and oriented to person, place, and time.            Admission on 07/22/2022, Discharged on 07/22/2022   Component Date Value Ref Range Status     SARS CoV2 PCR 07/22/2022 Negative  Negative Final    NEGATIVE: SARS-CoV-2 (COVID-19) RNA not detected, presumed negative.                   .  ..  Answers for HPI/ROS submitted by the patient on 8/8/2022  If you checked off any problems, how difficult have these problems made it for you to do your work, take care of things at home, or get along with other people?: Extremely difficult  PHQ9 TOTAL SCORE: 22

## 2022-08-11 ENCOUNTER — DOCUMENTATION ONLY (OUTPATIENT)
Dept: ORTHOPEDICS | Facility: CLINIC | Age: 32
End: 2022-08-11

## 2022-08-11 NOTE — PROGRESS NOTES
Action 8.11.22 MJ   Action Taken Received email from Sports Medicine with request from [employer] requesting medical records. Forwarded fax to [facility] Medical Records fax number @ [phone].

## 2022-08-17 NOTE — TELEPHONE ENCOUNTER
[employer] [facility] [employer] [facility] message sent to patient to initiate follow up appointment with Dr Albright as requested.  See 8/17/22 mychart encounter.    Joseph Jensen, ATC

## 2022-08-22 DIAGNOSIS — S93.522A TURF TOE OF LEFT FOOT: ICD-10-CM

## 2022-08-22 DIAGNOSIS — M79.672 ACUTE PAIN OF LEFT FOOT: ICD-10-CM
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
## 2022-08-22 RX ORDER — DICLOFENAC SODIUM 75 MG/1
75 TABLET, DELAYED RELEASE ORAL 2 TIMES DAILY
Qty: 60 TABLET | Refills: 0 | Status: SHIPPED | OUTPATIENT
Start: 2022-08-22 | End: 2022-11-30

## 2022-09-01 ENCOUNTER — OFFICE VISIT (OUTPATIENT)
Dept: ORTHOPEDICS | Facility: CLINIC | Age: 32
End: 2022-09-01
Payer: OTHER MISCELLANEOUS

## 2022-09-01 VITALS — BODY MASS INDEX: 19.4 KG/M2 | HEIGHT: 75 IN | WEIGHT: 156 LBS

## 2022-09-01 DIAGNOSIS — S93.522A TURF TOE OF LEFT FOOT: Primary | ICD-10-CM

## 2022-09-01 DIAGNOSIS — F48.9 POOR MENTAL HEALTH: ICD-10-CM

## 2022-09-01 PROCEDURE — 99215 OFFICE O/P EST HI 40 MIN: CPT | Performed by: FAMILY MEDICINE

## 2022-09-01 RX ORDER — LIDOCAINE 4 G/G
1 PATCH TOPICAL EVERY 24 HOURS
Qty: 10 PATCH | Refills: 0 | Status: SHIPPED | OUTPATIENT
Start: 2022-09-01 | End: 2022-09-16

## 2022-09-01 NOTE — LETTER
2022    Victor Manuel Saavedra  [address] [place] [address]  [phone] (home)     :     1990      To Whom it May Concern:    This patient was seen today in follow-up for his left great toe injury.  He continues require the need of the supporting cam walker boot because of his injury.  He will not be at work from 2022 to 10/1/22.  He will be involved in organized physical therapy for his great toe during this time.  He will follow-up in 1 month for reevaluation of his work restrictions.      Sincerely,        Gallito Bonilla MD

## 2022-09-01 NOTE — LETTER
Date:September 2, 2022      Patient was self referred, no letter generated. Do not send.        [facility] Health Information

## 2022-09-01 NOTE — LETTER
9/1/2022      RE: Victor Manuel Saavedra  [facility] [place] [address]     Dear Colleague,    Thank you for referring your patient, Victor Manuel Saavedra, to the [facility] [place]. Please see a copy of my visit note below.    fup acute turf toe injury left MTP great toe x 3.5 months.  DOI 5/14/22    Since the last visit he continues to have discomfort centralized over the MTP and along the dorsal shaft of the first metatarsal of his left foot.  He has tried some short periods of walking outside the boot and still finds it uncomfortable.  He has a half steel toed plate shoe insert that he has tried intermittently in a street shoe, but it does not seem to help adequately with his discomfort.  He points at the MTP joint at it's dorsal aspect as well as its medial aspect, as the areas that have given the greatest discomfort.  He has not followed up with physical therapy since the last visit.  He did see physical therapy for a visit 6/29/2022.  He saw them at the [facility] facility.    He verbalizes that he still finds it difficult to handle his demands at work.  He arrives at 8:00AM.  He is limited by toe discomfort throughout his workday, despite being in the cam boot.  He indicates that he is hopeful that if he had 4 weeks away from work, where he could focus on physical therapy and allow the toe to rest, that he might begin to see some improvements.    Patient has a history of anxiety, depression, ADHD.  Recently evaluated by mental health provider 7/22/2022.  Started on Lexapro 10 mg daily, Remeron 15 mg nightly with hydroxyzine 25 to 50 mg every 6 hours as needed for anxiety and sleep.  Referred to psychiatry 7/22/22.      Images reviewed by me:  XR FOOT LEFT G/E 3 VIEWS 5/24/2022 1:49 PM      HISTORY: pain over great metatarsal bone. Injured while climbing  ladder x 1.5 weeks; Foot injury, right, initial encounter     COMPARISON: None.                                                                        IMPRESSION: Normal joint spaces and alignment. No evidence of a  fracture, with attention to the first metatarsal.     KATIE UMANZOR MD           EXAM: MR FOOT LEFT W/O CONTRAST  LOCATION: [facility]  DATE/TIME: 8/2/2022 11:17 AM     INDICATION: Left great toe injury, persistent pain despite 2 months of conservative management (post op shoe, NSAIDS, ice, PT, relative rest). Concern for bone stress injury.  COMPARISON: None.  TECHNIQUE: Unenhanced.     FINDINGS:      JOINTS AND BONES:   -There is probably mild marrow edema versus less likely signal artifact involving the distal phalanges of the first through fourth toes, most marked along the distal whit (series 4 images 10, 15, 19, 22). No other bony abnormality. No joint abnormality.   No joint effusion or synovitis.     TENDONS:   -No tendon tear, tendinopathy, or tenosynovitis.      LIGAMENTS:   -Lisfranc ligament: Intact. No subluxation.     MUSCLES AND SOFT TISSUES:   -No muscle atrophy or edema. No soft tissue mass or fluid collection.                                                                      IMPRESSION:  1.  Mild marrow edema involving the distal phalanges of the first through fourth toes. This is nonspecific, acute traumatic or repetitive contusions most likely.  2.  No additional abnormality.        PMH:  Active problem list:  Patient Active Problem List   Diagnosis     Dysthymic Disorder     Traumatic Urethral Stricture     Fatigue     Tachycardia     Unspecified epilepsy without mention of intractable epilepsy     Fainting     Headache     Hematuria, unspecified     Urethral stricture unspecified       FH:  No family history on file.    SH:  Social History     Socioeconomic History     Marital status: Single     Spouse name: Not on file     Number of children: Not on file     Years of education: Not on file     Highest education level: Not on  file   Occupational History     Not on file   Tobacco Use     Smoking status: Current Some Day Smoker     Smokeless tobacco: Never Used   Substance and Sexual Activity     Alcohol use: Yes     Drug use: Never     Sexual activity: Not on file   Other Topics Concern     Not on file   Social History Narrative     Not on file     Social Determinants of Health     Financial Resource Strain: Not on file   Food Insecurity: Not on file   Transportation Needs: Not on file   Physical Activity: Not on file   Stress: Not on file   Social Connections: Not on file   Intimate Partner Violence: Not on file   Housing Stability: Not on file       MEDS:  See EMR, reviewed  ALL:  See EMR, reviewed    REVIEW OF SYSTEMS:  CONSTITUTIONAL:NEGATIVE for fever, chills, change in weight  INTEGUMENTARY/SKIN: NEGATIVE for worrisome rashes, moles or lesions  EYES: NEGATIVE for vision changes or irritation  ENT/MOUTH: NEGATIVE for ear, mouth and throat problems  RESP:NEGATIVE for significant cough or SOB  BREAST: NEGATIVE for masses, tenderness or discharge  CV: NEGATIVE for chest pain, palpitations or peripheral edema  GI: NEGATIVE for nausea, abdominal pain, heartburn, or change in bowel habits  :NEGATIVE for frequency, dysuria, or hematuria  :NEGATIVE for frequency, dysuria, or hematuria  NEURO: NEGATIVE for weakness, dizziness or paresthesias  ENDOCRINE: NEGATIVE for temperature intolerance, skin/hair changes  HEME/ALLERGY/IMMUNE: NEGATIVE for bleeding problems  PSYCHIATRIC: NEGATIVE for changes in mood or affect      Objective: I do not see evidence of a hallux limitus or rigidus at this point.  Passively he will still allow approximately 60 degrees of dorsiflexion, with discomfort at extremes of dorsiflexion.  Passively he will allow 45 to 50 degrees of plantarflexion at the MTP.  He is nontender over the sesamoids.  I do not find signs of anterior and posterior instability at the MTP on stress testing.  I do not find signs of medial or  lateral instability at the MTP on stress testing.  When he stands he can get his great toe flat on the floor, with no signs of developing contracture.  The overlying skin is normal.  He is nontender at the area of Lisfranc.  Sensation is intact distally.      Today we spent time reviewing the images of his MRI, which do not show disruption of the plantar capsule, did not show an associated fracture involving the metatarsal shaft or MTP joint, do not show signs of cartilage abnormality or loose body within the MTP joint, do not show subluxation of the sesamoids.    Today we spent time going over 3 videos, 2 minutes each, that show the philosophies that physical therapy employs about taping of an MTP joint, manual therapy and range of motion for an MTP joint, and other home supportive exercises.    Today we went over images of a custom carbon fiber outrigger orthotic that could be made by an orthotist to use instead of his over-the-counter half steel toe insert.    Today we discussed options for pain control including Voltaren gel that could be placed up to 4 times daily about the painful areas at the great toe alternatively a lidocaine patch could be tried.  I counseled him that insurance does not always supports the payment of lidocaine patches, there are over-the-counter lidocaine patches such as Salonpas.    Assessment turf toe injury x 3.5 months    Plan: The patient indicated that he would like 4 weeks away from his worksite to be able to be more diligent with physical therapy.  Physical therapy can include mobilization of the joint, taping options, strengthening of supportive structures.  Physical therapy referral provided.  A work note was provided for the next 4 weeks.  In the meantime I encouraged him again to wean himself from the cam walker boot to a firm soled shoe with his half steel toed insert.  I counseled him of the importance of trying to avoid developing a rigid great toe.  Prescription for  Voltaren ointment and lidocaine patches provided.  Staff at the conclusion of today's visit met with the patient, to assist the patient in securing contact and follow-up with his mental health provider.  Patient agreed to see me again in 4 weeks for readdressing his work restrictions.    45 minutes of face-to-face time was spent with the patient today.  Denia, certified athletic trainer, was present during the visit.                      Again, thank you for allowing me to participate in the care of your patient.      Sincerely,    Gallito Bonilla MD

## 2022-09-01 NOTE — PROGRESS NOTES
fup acute turf toe injury left MTP great toe x 3.5 months.  DOI 5/14/22    Since the last visit he continues to have discomfort centralized over the MTP and along the dorsal shaft of the first metatarsal of his left foot.  He has tried some short periods of walking outside the boot and still finds it uncomfortable.  He has a half steel toed plate shoe insert that he has tried intermittently in a street shoe, but it does not seem to help adequately with his discomfort.  He points at the MTP joint at it's dorsal aspect as well as its medial aspect, as the areas that have given the greatest discomfort.  He has not followed up with physical therapy since the last visit.  He did see physical therapy for a visit 6/29/2022.  He saw them at the [place] facility.    He verbalizes that he still finds it difficult to handle his demands at work.  He arrives at 8:00AM.  He is limited by toe discomfort throughout his workday, despite being in the cam boot.  He indicates that he is hopeful that if he had 4 weeks away from work, where he could focus on physical therapy and allow the toe to rest, that he might begin to see some improvements.    Patient has a history of anxiety, depression, ADHD.  Recently evaluated by mental health provider 7/22/2022.  Started on Lexapro 10 mg daily, Remeron 15 mg nightly with hydroxyzine 25 to 50 mg every 6 hours as needed for anxiety and sleep.  Referred to psychiatry 7/22/22.      Images reviewed by me:  XR FOOT LEFT G/E 3 VIEWS 5/24/2022 1:49 PM      HISTORY: pain over great metatarsal bone. Injured while climbing  ladder x 1.5 weeks; Foot injury, right, initial encounter     COMPARISON: None.                                                                       IMPRESSION: Normal joint spaces and alignment. No evidence of a  fracture, with attention to the first metatarsal.     KATIE UMANZOR MD           EXAM: MR FOOT LEFT W/O CONTRAST  LOCATION: [facility]  HOSPITAL  DATE/TIME: 8/2/2022 11:17 AM     INDICATION: Left great toe injury, persistent pain despite 2 months of conservative management (post op shoe, NSAIDS, ice, PT, relative rest). Concern for bone stress injury.  COMPARISON: None.  TECHNIQUE: Unenhanced.     FINDINGS:      JOINTS AND BONES:   -There is probably mild marrow edema versus less likely signal artifact involving the distal phalanges of the first through fourth toes, most marked along the distal whit (series 4 images 10, 15, 19, 22). No other bony abnormality. No joint abnormality.   No joint effusion or synovitis.     TENDONS:   -No tendon tear, tendinopathy, or tenosynovitis.      LIGAMENTS:   -Lisfranc ligament: Intact. No subluxation.     MUSCLES AND SOFT TISSUES:   -No muscle atrophy or edema. No soft tissue mass or fluid collection.                                                                      IMPRESSION:  1.  Mild marrow edema involving the distal phalanges of the first through fourth toes. This is nonspecific, acute traumatic or repetitive contusions most likely.  2.  No additional abnormality.        PMH:  Active problem list:  Patient Active Problem List   Diagnosis     Dysthymic Disorder     Traumatic Urethral Stricture     Fatigue     Tachycardia     Unspecified epilepsy without mention of intractable epilepsy     Fainting     Headache     Hematuria, unspecified     Urethral stricture unspecified       FH:  No family history on file.    SH:  Social History     Socioeconomic History     Marital status: Single     Spouse name: Not on file     Number of children: Not on file     Years of education: Not on file     Highest education level: Not on file   Occupational History     Not on file   Tobacco Use     Smoking status: Current Some Day Smoker     Smokeless tobacco: Never Used   Substance and Sexual Activity     Alcohol use: Yes     Drug use: Never     Sexual activity: Not on file   Other Topics Concern     Not on file   Social  History Narrative     Not on file     Social Determinants of Health     Financial Resource Strain: Not on file   Food Insecurity: Not on file   Transportation Needs: Not on file   Physical Activity: Not on file   Stress: Not on file   Social Connections: Not on file   Intimate Partner Violence: Not on file   Housing Stability: Not on file       MEDS:  See EMR, reviewed  ALL:  See EMR, reviewed    REVIEW OF SYSTEMS:  CONSTITUTIONAL:NEGATIVE for fever, chills, change in weight  INTEGUMENTARY/SKIN: NEGATIVE for worrisome rashes, moles or lesions  EYES: NEGATIVE for vision changes or irritation  ENT/MOUTH: NEGATIVE for ear, mouth and throat problems  RESP:NEGATIVE for significant cough or SOB  BREAST: NEGATIVE for masses, tenderness or discharge  CV: NEGATIVE for chest pain, palpitations or peripheral edema  GI: NEGATIVE for nausea, abdominal pain, heartburn, or change in bowel habits  :NEGATIVE for frequency, dysuria, or hematuria  :NEGATIVE for frequency, dysuria, or hematuria  NEURO: NEGATIVE for weakness, dizziness or paresthesias  ENDOCRINE: NEGATIVE for temperature intolerance, skin/hair changes  HEME/ALLERGY/IMMUNE: NEGATIVE for bleeding problems  PSYCHIATRIC: NEGATIVE for changes in mood or affect      Objective: I do not see evidence of a hallux limitus or rigidus at this point.  Passively he will still allow approximately 60 degrees of dorsiflexion, with discomfort at extremes of dorsiflexion.  Passively he will allow 45 to 50 degrees of plantarflexion at the MTP.  He is nontender over the sesamoids.  I do not find signs of anterior and posterior instability at the MTP on stress testing.  I do not find signs of medial or lateral instability at the MTP on stress testing.  When he stands he can get his great toe flat on the floor, with no signs of developing contracture.  The overlying skin is normal.  He is nontender at the area of Lisfranc.  Sensation is intact distally.      Today we spent time reviewing  the images of his MRI, which do not show disruption of the plantar capsule, did not show an associated fracture involving the metatarsal shaft or MTP joint, do not show signs of cartilage abnormality or loose body within the MTP joint, do not show subluxation of the sesamoids.    Today we spent time going over 3 videos, 2 minutes each, that show the philosophies that physical therapy employs about taping of an MTP joint, manual therapy and range of motion for an MTP joint, and other home supportive exercises.    Today we went over images of a custom carbon fiber outrigger orthotic that could be made by an orthotist to use instead of his over-the-counter half steel toe insert.    Today we discussed options for pain control including Voltaren gel that could be placed up to 4 times daily about the painful areas at the great toe alternatively a lidocaine patch could be tried.  I counseled him that insurance does not always supports the payment of lidocaine patches, there are over-the-counter lidocaine patches such as Salonpas.    Assessment turf toe injury x 3.5 months    Plan: The patient indicated that he would like 4 weeks away from his worksite to be able to be more diligent with physical therapy.  Physical therapy can include mobilization of the joint, taping options, strengthening of supportive structures.  Physical therapy referral provided.  A work note was provided for the next 4 weeks.  In the meantime I encouraged him again to wean himself from the cam walker boot to a firm soled shoe with his half steel toed insert.  I counseled him of the importance of trying to avoid developing a rigid great toe.  Prescription for Voltaren ointment and lidocaine patches provided.  Staff at the conclusion of today's visit met with the patient, to assist the patient in securing contact and follow-up with his mental health provider.  Patient agreed to see me again in 4 weeks for readdressing his work restrictions.    45  minutes of face-to-face time was spent with the patient today.  Denia, certified athletic trainer, was present during the visit.

## 2022-09-02 ENCOUNTER — TELEPHONE (OUTPATIENT)
Dept: ORTHOPEDICS | Facility: CLINIC | Age: 32
End: 2022-09-02

## 2022-09-02 NOTE — TELEPHONE ENCOUNTER
[facility] Call Center    Phone Message    May a detailed message be left on voicemail: yes     Reason for Call: Other: pt's employer would like to speak with team about haveing patient being released to do light duuty      Action Taken: Message routed to:  [facility] ortho    Travel Screening: Not Applicable     Please call [phone] ext. [phone]

## 2022-09-02 NOTE — TELEPHONE ENCOUNTER
Spoke with Ольга,  explained that  had a very throughout conversation with Victor Manuel and taking him out of work for the next 4 weeks as this is what is best for the patient to get himself going in the right direction. Risk Management was understandable and just wanted to see if he could be on sedentary duty instead of completely out of work but understood this is what is best for Victor Manuel right now. She was thankful for the call and information.

## 2022-09-08 ENCOUNTER — MYC MEDICAL ADVICE (OUTPATIENT)
Dept: FAMILY MEDICINE | Facility: CLINIC | Age: 32
End: 2022-09-08

## 2022-09-08 DIAGNOSIS — M79.672 LEFT FOOT PAIN: Primary | ICD-10-CM

## 2022-09-09 NOTE — TELEPHONE ENCOUNTER
- when working, he was not getting adequate sleep related to pain. Finding decrease appetite. Using gel before bed. Not taking tylenol or ibuprofen before bed. No meds when not at work. At work 600 mg Advil every six hours (twice), and Tylenol 1000 mg.    Hurts to stand on left foot without cam boot.   But recently changed to 8 am to start work to try and work with him.     Now being off able to basics done and catch up on dishes, sweep as he is able, has been to rest better which helps his endurance. Bone on top still hurts. SEEN MD Bonilla.     LIU Kaufman CNP

## 2022-09-12 ENCOUNTER — HOSPITAL ENCOUNTER (OUTPATIENT)
Dept: PHYSICAL THERAPY | Facility: REHABILITATION | Age: 32
Discharge: HOME | End: 2022-09-12
Attending: FAMILY MEDICINE
Payer: OTHER MISCELLANEOUS

## 2022-09-12 DIAGNOSIS — S93.522A TURF TOE OF LEFT FOOT: Primary | ICD-10-CM

## 2022-09-12 PROCEDURE — 97140 MANUAL THERAPY 1/> REGIONS: CPT | Mod: GP | Performed by: PHYSICAL THERAPIST

## 2022-09-12 PROCEDURE — 97110 THERAPEUTIC EXERCISES: CPT | Mod: GP | Performed by: PHYSICAL THERAPIST

## 2022-09-14 ENCOUNTER — TELEPHONE (OUTPATIENT)
Dept: PHYSICAL THERAPY | Facility: REHABILITATION | Age: 32
End: 2022-09-14

## 2022-09-14 DIAGNOSIS — S93.522A TURF TOE OF LEFT FOOT: Primary | ICD-10-CM

## 2022-09-14 RX ORDER — DEXAMETHASONE SODIUM PHOSPHATE 4 MG/ML
INJECTION, SOLUTION INTRA-ARTICULAR; INTRALESIONAL; INTRAMUSCULAR; INTRAVENOUS; SOFT TISSUE
Qty: 30 ML | Refills: 0 | Status: SHIPPED | OUTPATIENT
Start: 2022-09-14 | End: 2022-11-30

## 2022-09-16 ENCOUNTER — NURSE TRIAGE (OUTPATIENT)
Dept: NURSING | Facility: CLINIC | Age: 32
End: 2022-09-16

## 2022-09-16 ENCOUNTER — VIRTUAL VISIT (OUTPATIENT)
Dept: FAMILY MEDICINE | Facility: CLINIC | Age: 32
End: 2022-09-16
Payer: COMMERCIAL

## 2022-09-16 DIAGNOSIS — R63.0 ANOREXIA: Primary | ICD-10-CM

## 2022-09-16 DIAGNOSIS — F41.1 GAD (GENERALIZED ANXIETY DISORDER): ICD-10-CM

## 2022-09-16 DIAGNOSIS — F34.1 DYSTHYMIC DISORDER: ICD-10-CM

## 2022-09-16 DIAGNOSIS — F34.1 DYSTHYMIC DISORDER: Primary | ICD-10-CM

## 2022-09-16 PROCEDURE — 99213 OFFICE O/P EST LOW 20 MIN: CPT | Mod: 95 | Performed by: NURSE PRACTITIONER

## 2022-09-16 RX ORDER — OLANZAPINE 2.5 MG/1
TABLET, FILM COATED ORAL
Qty: 42 TABLET | Refills: 0 | Status: SHIPPED | OUTPATIENT
Start: 2022-09-16 | End: 2022-10-13

## 2022-09-16 RX ORDER — HYDROXYZINE HYDROCHLORIDE 25 MG/1
25-50 TABLET, FILM COATED ORAL EVERY 6 HOURS PRN
Qty: 30 TABLET | Refills: 0 | Status: SHIPPED | OUTPATIENT
Start: 2022-09-16

## 2022-09-16 RX ORDER — MIRTAZAPINE 15 MG/1
15 TABLET, FILM COATED ORAL AT BEDTIME
COMMUNITY
End: 2022-10-13

## 2022-09-16 RX ORDER — ESCITALOPRAM OXALATE 10 MG/1
TABLET ORAL
Qty: 27 TABLET | Refills: 0 | Status: SHIPPED | OUTPATIENT
Start: 2022-09-16 | End: 2022-10-13 | Stop reason: SINTOL

## 2022-09-16 ASSESSMENT — ANXIETY QUESTIONNAIRES
6. BECOMING EASILY ANNOYED OR IRRITABLE: NEARLY EVERY DAY
5. BEING SO RESTLESS THAT IT IS HARD TO SIT STILL: NEARLY EVERY DAY
GAD7 TOTAL SCORE: 18
GAD7 TOTAL SCORE: 18
2. NOT BEING ABLE TO STOP OR CONTROL WORRYING: MORE THAN HALF THE DAYS
7. FEELING AFRAID AS IF SOMETHING AWFUL MIGHT HAPPEN: NEARLY EVERY DAY
IF YOU CHECKED OFF ANY PROBLEMS ON THIS QUESTIONNAIRE, HOW DIFFICULT HAVE THESE PROBLEMS MADE IT FOR YOU TO DO YOUR WORK, TAKE CARE OF THINGS AT HOME, OR GET ALONG WITH OTHER PEOPLE: EXTREMELY DIFFICULT
1. FEELING NERVOUS, ANXIOUS, OR ON EDGE: NEARLY EVERY DAY
3. WORRYING TOO MUCH ABOUT DIFFERENT THINGS: MORE THAN HALF THE DAYS

## 2022-09-16 ASSESSMENT — PATIENT HEALTH QUESTIONNAIRE - PHQ9
SUM OF ALL RESPONSES TO PHQ QUESTIONS 1-9: 22
5. POOR APPETITE OR OVEREATING: MORE THAN HALF THE DAYS
SUM OF ALL RESPONSES TO PHQ QUESTIONS 1-9: 22
10. IF YOU CHECKED OFF ANY PROBLEMS, HOW DIFFICULT HAVE THESE PROBLEMS MADE IT FOR YOU TO DO YOUR WORK, TAKE CARE OF THINGS AT HOME, OR GET ALONG WITH OTHER PEOPLE: EXTREMELY DIFFICULT

## 2022-09-16 NOTE — TELEPHONE ENCOUNTER
Pt is phoning stating that he is unable to eat and has undiagnosed anorexia     Pt states that he is unable to eat solids and that he is taking boost for calories     Pt states that he was recently hurt on the job and feels that this has set off another bout of being unable to eat     Per Disposition: See PCP Within 24 Hours    Transferred to scheduling and if no appointments today, pt will be going to [facility] [place]    Care advice given per protocol and when to call back. Pt verbalized understanding and agrees to plan of care.    Kristina Ortega RN  [place] Nurse Advisor  2:19 PM 9/16/2022      COVID 19 Nurse Triage Plan/Patient Instructions    Please be aware that novel coronavirus (COVID-19) may be circulating in the community. If you develop symptoms such as fever, cough, or SOB or if you have concerns about the presence of another infection including coronavirus (COVID-19), please contact your health care provider or visit [email or web address].     Disposition/Instructions    In-Person Visit with provider recommended. Reference Visit Selection Guide.    Thank you for taking steps to prevent the spread of this virus.  o Limit your contact with others.  o Wear a simple mask to cover your cough.  o Wash your hands well and often.    Resources    [facility]: About COVID-19: [email or web address]    CDC: What to Do If You're Sick: www.cdc.gov/coronavirus/2019-ncov/about/steps-when-sick.html    CDC: Ending Home Isolation: www.cdc.gov/coronavirus/2019-ncov/hcp/disposition-in-home-patients.html     CDC: Caring for Someone: www.cdc.gov/coronavirus/2019-ncov/if-you-are-sick/care-for-someone.html     [facility]: Interim Guidance for Hospital Discharge to Home: www.[facility]/diseases/coronavirus/hcp/hospdischarge.pdf    [facility] clinical trials (COVID-19 research studies): [email or web address].[email or web address]/umn-clinical-trials     Below are the COVID-19 hotlines at the [place]  Department of Health ([facility]). Interpreters are available.   o For health questions: Call 087-100-4230 or 1-549.976.9807 (7 a.m. to 7 p.m.)  o For questions about schools and childcare: Call 744-377-9093 or 1-330.182.7262 (7 a.m. to 7 p.m.)                       Reason for Disposition    MODERATE fatigue or weakness (i.e., interferes with work, school, normal activities)    Additional Information    Negative: Shock suspected (e.g., cold/pale/clammy skin, too weak to stand, low BP, rapid pulse)    Negative: SEVERE weakness (i.e., unable to walk or barely able to walk, requires support)    Negative: Sounds like a life-threatening emergency to the triager    Negative: Suicide thoughts, threats, attempts, or questions    Negative: Chest pain    Negative: Palpitations, skipped heart beat or rapid heart beat    Negative: [1] SEVERE symptoms (e.g., BP < 90/60, dehydration, heart rate < 40) AND [2] known or suspected eating disorder    Negative: [1] Losing weight AND [2] diabetes suspected by triager (e.g., excessive drinking, frequent urination, rapid breathing, etc.)    Negative: [1] Drinking very little AND [2] dehydration suspected (e.g., no urine > 12 hours, very dry mouth, very lightheaded)    Negative: Patient sounds very sick or weak to the triager    Negative: [1] Caller has URGENT question (includes prescribed medication questions) AND [2] triager unable to answer question    Negative: SEVERE weight loss (e.g., BMI < 15, current weight < 85% of healthy body weight, rapid weight loss, complete food refusal)    Protocols used: EATING DISORDERS SYMPTOMS AND [id number]-Y-AX

## 2022-09-16 NOTE — PROGRESS NOTES
Victor Manuel is a 32 year old who is being evaluated via a billable video visit.      How would you like to obtain your AVS? MyChart  If the video visit is dropped, the invitation should be resent by: Text to cell phone: [phone]  Will anyone else be joining your video visit? No    Assessment & Plan     Anorexia  Referral given for mental health for CBT.  Starting on low dose ZYprexa with increase after 2 weeks, to see if this improves patients appetite, depression and anxiety.  Referral placed for nutrition to assist patient as well on a diet that is going to keep him nourished as we work on his appetite.  Follow-up in 1 month for recheck.  - Adult Mental Health  Referral; Future  - OLANZapine (ZYPREXA) 2.5 MG tablet; Take 1 tablet (2.5 mg) by mouth At Bedtime for 14 days, THEN 2 tablets (5 mg) At Bedtime for 14 days.  - Nutrition Referral; Future    Dysthymic disorder  See note above.    STEPHANI (generalized anxiety disorder)  See note above.    See Patient Instructions    Return in about 4 weeks (around 10/14/2022) for Follow up, using a video visit.    Carmelina Veras NP  [facility]    Subjective   Victor Manuel is a 32 year old, presenting for the following health issues:  Eating Issues  (May 13th he was up at about 300 feet. He works as a tower tech. Wedged his foot, when he pulled himself up, he pushed his bone down in his foot and it has caused him a lot of pain. Which has caused a lot of issues with him eating due to pain. He is only able to drink a nutrition shake.  His teeth hurt. He has lost 20 pounds since May 13th.  Would like to talk about what he can do to get nutrients. ) and Depression (Went to the ER in July was given 1 month of Lexapro, Hydroxyzine and mirtazipine and has not gotten any refills since.  He feels like his life is spiraling downwards. )      HPI     Chief Complaint   Patient presents with     Eating Issues      May 13th he was up at about 300 feet. He works as a  tower tech. Wedged his foot, when he pulled himself up, he pushed his bone down in his foot and it has caused him a lot of pain. Which has caused a lot of issues with him eating due to pain. He is only able to drink a nutrition shake.  His teeth hurt. He has lost 20 pounds since May 13th.  Would like to talk about what he can do to get nutrients.      Depression     Went to the ER in July was given 1 month of Lexapro, Hydroxyzine and mirtazipine and has not gotten any refills since.  He feels like his life is spiraling downwards.      Patient states he has lost about 20 pounds in the last 3 to 4 weeks due to stress and anxiety after a fall at work.  He has been unable to go to work, unsure if he is going to have a job long after getting back to work in this months time and has history of being homeless in his 20s and has a hard time finding food even with a job.  He feels like he does not want even get up to go to the grocery store to do things.  Currently work comp is supposed to be paying him but stated that he will not be paid for this month which also reduces his financial capability.  Patient does get $170 disability through his  disabilities.  He gets no other governmental help.  Currently he is unsure whether he has dental or not but thinks that he does have health and dental at this time.  Patient states that he was put on antidepressants and anxiety medication but never really took them long enough and never made it back to the clinic to get refills.  Patient states that he does not have issues with his stomach in heartburn or abdominal pain but more when he sees food just does not want to eat it.  Patient seems to be more concerned about his eating issue currently then has depression anxiety.  He is not suicidal or having feelings of hurting himself or others.    Depression and Anxiety Follow-Up    How are you doing with your depression since your last visit? Worsened     How are you doing with  your anxiety since your last visit?  Worsened     Are you having other symptoms that might be associated with depression or anxiety? Yes:  not eating    Have you had a significant life event? Job Concerns and Health Concerns     Do you have any concerns with your use of alcohol or other drugs? No    Social History     Tobacco Use     Smoking status: Current Some Day Smoker     Smokeless tobacco: Never Used   Substance Use Topics     Alcohol use: Yes     Drug use: Never     PHQ 8/8/2022 9/16/2022 9/16/2022   PHQ-9 Total Score 22 21 22   Q9: Thoughts of better off dead/self-harm past 2 weeks Not at all Not at all Not at all     STEPHANI-7 SCORE 6/8/2022 9/16/2022   Total Score 21 (severe anxiety) -   Total Score - 18   Some encounter information is confidential and restricted. Go to Review [employer] activity to see all data.     Last PHQ-9 9/16/2022   1.  Little interest or pleasure in doing things 3   2.  Feeling down, depressed, or hopeless 3   3.  Trouble falling or staying asleep, or sleeping too much 3   4.  Feeling tired or having little energy 3   5.  Poor appetite or overeating 3   6.  Feeling bad about yourself 1   7.  Trouble concentrating 3   8.  Moving slowly or restless 3   Q9: Thoughts of better off dead/self-harm past 2 weeks 0   PHQ-9 Total Score 22     STPEHANI-7  9/16/2022   1. Feeling nervous, anxious, or on edge 3   2. Not being able to stop or control worrying 2   3. Worrying too much about different things 2   4. Trouble relaxing 2   5. Being so restless that it is hard to sit still 3   6. Becoming easily annoyed or irritable 3   7. Feeling afraid, as if something awful might happen 3   STEPHANI-7 Total Score 18   If you checked any problems, how difficult have they made it for you to do your work, take care of things at home, or get along with other people? Extremely difficult   Some encounter information is confidential and restricted. Go to Review [employer] activity to see all data.     Review of Systems    CONSTITUTIONAL: NEGATIVE for fever, chills, change in weight  RESP: NEGATIVE for significant cough or SOB  CV: NEGATIVE for chest pain, palpitations or peripheral edema  GI: POSITIVE for poor appetite  PSYCHIATRIC: POSITIVE forHx anxiety and Hx depression  ROS otherwise negative      Objective       fVitals:  No vitals were obtained today due to virtual visit.    Physical Exam   GENERAL: Healthy, alert and no distress  EYES: Eyes grossly normal to inspection.  No discharge or erythema, or obvious scleral/conjunctival abnormalities.  RESP: No audible wheeze, cough, or visible cyanosis.  No visible retractions or increased work of breathing.    SKIN: Visible skin clear. No significant rash, abnormal pigmentation or lesions.  NEURO: Cranial nerves grossly intact.  Mentation and speech appropriate for age.  PSYCH: Mentation appears normal, affect normal/bright, judgement and insight intact, normal speech and appearance well-groomed.      Video-Visit Details    Video Start Time: 3:00 PM    Type of service:  Video Visit    Video End Time:3:16 PM    Originating Location (pt. Location): Home    Distant Location (provider location):  [facility]     Platform used for Video Visit: [employer]

## 2022-09-16 NOTE — ADDENDUM NOTE
Encounter addended by: Mary Maritnez, PT on: 9/16/2022 12:46 PM   Actions taken: Visit diagnoses modified, Order list changed, Diagnosis association updated

## 2022-09-16 NOTE — PATIENT INSTRUCTIONS
Take xyprexa as directed.  Make appointment with nutrition for evaluation.  Make appointment with counseling for CBT on anorexia.  Follow-up in 1 month video visit for recheck.  Call in the meantime at [facility] to ZACH Benavidez if you have any issues or questions.

## 2022-09-25 ENCOUNTER — HEALTH MAINTENANCE LETTER (OUTPATIENT)
Age: 32
End: 2022-09-25

## 2022-09-29 ENCOUNTER — OFFICE VISIT (OUTPATIENT)
Dept: ORTHOPEDICS | Facility: CLINIC | Age: 32
End: 2022-09-29
Payer: OTHER MISCELLANEOUS

## 2022-09-29 ENCOUNTER — ANCILLARY PROCEDURE (OUTPATIENT)
Dept: GENERAL RADIOLOGY | Facility: CLINIC | Age: 32
End: 2022-09-29
Attending: FAMILY MEDICINE
Payer: OTHER MISCELLANEOUS

## 2022-09-29 ENCOUNTER — TELEPHONE (OUTPATIENT)
Dept: FAMILY MEDICINE | Facility: CLINIC | Age: 32
End: 2022-09-29

## 2022-09-29 VITALS — WEIGHT: 156 LBS | BODY MASS INDEX: 19.4 KG/M2 | HEIGHT: 75 IN

## 2022-09-29 DIAGNOSIS — F32.A DEPRESSION, UNSPECIFIED DEPRESSION TYPE: ICD-10-CM

## 2022-09-29 DIAGNOSIS — M79.672 LEFT FOOT PAIN: ICD-10-CM

## 2022-09-29 DIAGNOSIS — F43.22 ADJUSTMENT DISORDER WITH ANXIETY: Primary | ICD-10-CM

## 2022-09-29 PROCEDURE — 73630 X-RAY EXAM OF FOOT: CPT | Mod: LT | Performed by: RADIOLOGY

## 2022-09-29 PROCEDURE — 99213 OFFICE O/P EST LOW 20 MIN: CPT | Performed by: FAMILY MEDICINE

## 2022-09-29 NOTE — LETTER
9/29/2022      RE: Victor Manuel Saavedra  [facility] [place] [address]     Dear Colleague,    Thank you for referring your patient, Victor Manuel Saavedra, to the [facility] [place]. Please see a copy of my visit note below.    September 29, 2022: Victor Manuel Saavedra is a 32 year old male who is seen in f/u up for great toe.    fup left great toe injury x 4.5 months DOI 5/14/22    Since the last visit a month ago he tells me that he has been seeing physical therapy faithfully.  They have been working on range of motion exercises and are considering doing some modalities including phonophoresis.  He is interested to try it.  He has been using the cam walker boot, although he does have a half steel toed insert that I have counseled him in the past that he should begin using with a regular shoe.  He indicates that he had recently noted some discomfort along the midshaft of his first metatarsal medially.            Images reviewed by me:  XR FOOT LEFT G/E 3 VIEWS 5/24/2022 1:49 PM      HISTORY: pain over great metatarsal bone. Injured while climbing  ladder x 1.5 weeks; Foot injury, right, initial encounter     COMPARISON: None.                                                                       IMPRESSION: Normal joint spaces and alignment. No evidence of a  fracture, with attention to the first metatarsal.     KATIE UMANZOR MD                EXAM: MR FOOT LEFT W/O CONTRAST  LOCATION: [facility]  DATE/TIME: 8/2/2022 11:17 AM     INDICATION: Left great toe injury, persistent pain despite 2 months of conservative management (post op shoe, NSAIDS, ice, PT, relative rest). Concern for bone stress injury.  COMPARISON: None.  TECHNIQUE: Unenhanced.     FINDINGS:      JOINTS AND BONES:   -There is probably mild marrow edema versus less likely signal artifact involving the distal phalanges of the first through fourth toes, most marked along the distal whit (series  4 images 10, 15, 19, 22). No other bony abnormality. No joint abnormality.   No joint effusion or synovitis.     TENDONS:   -No tendon tear, tendinopathy, or tenosynovitis.      LIGAMENTS:   -Lisfranc ligament: Intact. No subluxation.     MUSCLES AND SOFT TISSUES:   -No muscle atrophy or edema. No soft tissue mass or fluid collection.                                                                      IMPRESSION:  1.  Mild marrow edema involving the distal phalanges of the first through fourth toes. This is nonspecific, acute traumatic or repetitive contusions most likely.  2.  No additional abnormality.                  Patient was started on Lexapro 10 mg daily, hydroxyzine 25 to 50 mg every 6 hours as needed anxiety and sleep issues, by mental health provider at [facility] 7/22/2022, Penny Dee CNP of the [facility] unit, for the diagnoses of anxiety and depression.  At that time records indicate that he was referred to outpatient psychiatry and outpatient individual mental health therapy.    According to records, pt was to have a subsequent mental health initial visit with a particular provider, Donna Roach MA, PA-C  [facility]     8/22/2022 1:30 pm.  According to records pt was contacted 7/25/22 by Antoine Dye RN of the [facility] health clinic in [place] to arrange psychiatry scheduling.  According to phone note patient declined psychiatry scheduling at that time in favor of waiting until the 8/22/2022 appointment.  The patient was given contact information at that time for the transition clinic provider, Alyx Hare, to act as a temporary service to bridge the time before his next mental health appointment.  The Transition Clinic phone # is [phone].          PMH:    Patient Active Problem List   Diagnosis     Dysthymic Disorder     Traumatic Urethral Stricture     Fatigue     Tachycardia     Unspecified epilepsy without mention of intractable epilepsy      Fainting     Headache     Hematuria, unspecified     Urethral stricture unspecified       FH:  Father: anxiety, depression  Mother: anxiety, depression  Siblings: anxiety x3, depression x3, thyroid disorder    SH:  Social History     Socioeconomic History     Marital status: Single     Spouse name: Not on file     Number of children: Not on file     Years of education: Not on file     Highest education level: Not on file   Occupational History     Not on file   Tobacco Use     Smoking status: Current Some Day Smoker     Smokeless tobacco: Never Used   Substance and Sexual Activity     Alcohol use: Yes     Drug use: Never     Sexual activity: Not on file   Other Topics Concern     Not on file   Social History Narrative     Not on file     Social Determinants of Health     Financial Resource Strain: Not on file   Food Insecurity: Not on file   Transportation Needs: Not on file   Physical Activity: Not on file   Stress: Not on file   Social Connections: Not on file   Intimate Partner Violence: Not on file   Housing Stability: Not on file       MEDS:  See EMR, reviewed  ALL:  See EMR, reviewed    REVIEW OF SYSTEMS:  CONSTITUTIONAL:NEGATIVE for fever, chills, change in weight  INTEGUMENTARY/SKIN: NEGATIVE for worrisome rashes, moles or lesions  EYES: NEGATIVE for vision changes or irritation  ENT/MOUTH: NEGATIVE for ear, mouth and throat problems  RESP:NEGATIVE for significant cough or SOB  BREAST: NEGATIVE for masses, tenderness or discharge  CV: NEGATIVE for chest pain, palpitations or peripheral edema  GI: NEGATIVE for nausea, abdominal pain, heartburn, or change in bowel habits  :NEGATIVE for frequency, dysuria, or hematuria  :NEGATIVE for frequency, dysuria, or hematuria  NEURO: NEGATIVE for weakness, dizziness or paresthesias  ENDOCRINE: NEGATIVE for temperature intolerance, skin/hair changes  HEME/ALLERGY/IMMUNE: NEGATIVE for bleeding problems  PSYCHIATRIC: NEGATIVE for changes in mood or  affect      Objective: No evidence of a hallux limitus or rigidus.  Passively he will allow approximately 60 degrees of dorsiflexion, with discomfort at extremes of dorsiflexion.  Passively he will allow 50 degrees of plantarflexion at the MTP.  He is nontender over the sesamoids.  I do not find signs of anterior and posterior instability at the MTP on stress testing.  I do not find signs of medial or lateral instability at the MTP on stress testing.  He stands with his great toe flat on the floor, with no signs of developing contracture.    He has a normal-appearing arch without evidence of a pronated heel.  He is nontender along the course of the posterior tibial tendon.  The overlying skin is normal.  He is nontender at the area of Lisfranc.  Sensation is intact distally.  He describes focal discomfort limited to the bony midsection of his first metatarsal.  He is nontender at the navicular.  Nontender at the area of Lisfranc.  Overlying skin is normal.    I personally reviewed with the patient updated x-rays of the foot that show no bony abnormality or periosteal reaction along the midshaft of the first metatarsal.      Assessment: Great toe turf toe injury, left.  History of anxiety and depression.    Plan: We discussed that his work does have a seated job available.  Patient does not believe that he would be able to handle an 8 hour/day work schedule.  He also needs to continue to see physical therapy to avoid getting a stiff toe and to continue to advance his weightbearing status.  Therefore we agreed that for the next 4 weeks he will return to a 4-hour shift in the afternoons, and continue with his morning physical therapy visits.  I asked him to work on his outpatient protocol from physical therapy faithfully and to start transferring from the cam walker boot to a regular shoe, with his half steel toed insert, to avoid becoming too dependent on the cam walker boot.  I suspect he may have some arch  discomfort from being in the flat cam walker boot.  He was provided with arch support to use inside the cam walker boot today.  Follow-up in 4 weeks for reevaluation of work restrictions.    Finally, he tells me that he has recently had a virtual visit with a mental health provider and has been able to secure his current medicine refills.  He indicates that Lexapro has been helpful and Atarax has been helpful.  However he is not sure that this is a provider who is going to provide him ongoing mental health support over time.  He is wanting to find someone within his network because of the expense.  Therefore I put in referrals for both mental health therapist, and also psychiatry to assist with his medicine prescriptions and management.              Again, thank you for allowing me to participate in the care of your patient.      Sincerely,    Gallito Bonilla MD

## 2022-09-29 NOTE — TELEPHONE ENCOUNTER
I am getting messages from patient's psychologist.  I saw him for video visit.  Patient needs follow-up and will need to establish care with a provider for further refills.  Please call and inform him that he needs to do this since it does not seem that his psych office is willing to refill medications since they did not originate them and we need to re-evaluate patient in person as well.  Carmelina Veras NP on 9/29/2022 at 11:51 AM

## 2022-09-29 NOTE — LETTER
Date:September 30, 2022      Patient was self referred, no letter generated. Do not send.        [facility] Information

## 2022-09-29 NOTE — LETTER
2022    Victor Manuel Saavedra  [address] [place] [address]  [phone] (home)     :     1990      To Whom it May Concern:    This patient was seen today in follow-up for his left great toe injury.    He will not be at work 2022 to 10/2/2022.  He will return to work, limited to a 4-hour shift only, from 12 PM to 4 PM, for the next month, from 10/3/2022 to 11/3/2022.  He will be limited to a seated job during these 4-hour shifts.  He will continue with his physical therapy program in the mornings during this time.  He will continue with his cam walker boot at work.  He will be seen again in a month for advancing his work restrictions.      Sincerely,        Gallito Bonilla MD

## 2022-09-29 NOTE — PROGRESS NOTES
September 29, 2022: Victor Manuel Saavedra is a 32 year old male who is seen in f/u up for great toe.    fup left great toe injury x 4.5 months DOI 5/14/22    Since the last visit a month ago he tells me that he has been seeing physical therapy faithfully.  They have been working on range of motion exercises and are considering doing some modalities including phonophoresis.  He is interested to try it.  He has been using the cam walker boot, although he does have a half steel toed insert that I have counseled him in the past that he should begin using with a regular shoe.  He indicates that he had recently noted some discomfort along the midshaft of his first metatarsal medially.            Images reviewed by me:  XR FOOT LEFT G/E 3 VIEWS 5/24/2022 1:49 PM      HISTORY: pain over great metatarsal bone. Injured while climbing  ladder x 1.5 weeks; Foot injury, right, initial encounter     COMPARISON: None.                                                                       IMPRESSION: Normal joint spaces and alignment. No evidence of a  fracture, with attention to the first metatarsal.     KATIE UMANZOR MD                EXAM: MR FOOT LEFT W/O CONTRAST  LOCATION: [facility]  DATE/TIME: 8/2/2022 11:17 AM     INDICATION: Left great toe injury, persistent pain despite 2 months of conservative management (post op shoe, NSAIDS, ice, PT, relative rest). Concern for bone stress injury.  COMPARISON: None.  TECHNIQUE: Unenhanced.     FINDINGS:      JOINTS AND BONES:   -There is probably mild marrow edema versus less likely signal artifact involving the distal phalanges of the first through fourth toes, most marked along the distal whit (series 4 images 10, 15, 19, 22). No other bony abnormality. No joint abnormality.   No joint effusion or synovitis.     TENDONS:   -No tendon tear, tendinopathy, or tenosynovitis.      LIGAMENTS:   -Lisfranc ligament: Intact. No subluxation.     MUSCLES AND SOFT  TISSUES:   -No muscle atrophy or edema. No soft tissue mass or fluid collection.                                                                      IMPRESSION:  1.  Mild marrow edema involving the distal phalanges of the first through fourth toes. This is nonspecific, acute traumatic or repetitive contusions most likely.  2.  No additional abnormality.                  Patient was started on Lexapro 10 mg daily, hydroxyzine 25 to 50 mg every 6 hours as needed anxiety and sleep issues, by mental health provider at [facility] 7/22/2022, Penny Dee CNP of the [facility] unit, for the diagnoses of anxiety and depression.  At that time records indicate that he was referred to outpatient psychiatry and outpatient individual mental health therapy.    According to records, pt was to have a subsequent mental health initial visit with a particular provider, Donna Roach MA, PA-C  [facility]     8/22/2022 1:30 pm.  According to records pt was contacted 7/25/22 by Antoine Dye RN of the [facility] health clinic in [place] to arrange psychiatry scheduling.  According to phone note patient declined psychiatry scheduling at that time in favor of waiting until the 8/22/2022 appointment.  The patient was given contact information at that time for the transition clinic provider, Alyx Hare, to act as a temporary service to bridge the time before his next mental health appointment.  The Transition Clinic phone # is [phone].          PMH:    Patient Active Problem List   Diagnosis     Dysthymic Disorder     Traumatic Urethral Stricture     Fatigue     Tachycardia     Unspecified epilepsy without mention of intractable epilepsy     Fainting     Headache     Hematuria, unspecified     Urethral stricture unspecified       FH:  Father: anxiety, depression  Mother: anxiety, depression  Siblings: anxiety x3, depression x3, thyroid disorder    SH:  Social History     Socioeconomic History      Marital status: Single     Spouse name: Not on file     Number of children: Not on file     Years of education: Not on file     Highest education level: Not on file   Occupational History     Not on file   Tobacco Use     Smoking status: Current Some Day Smoker     Smokeless tobacco: Never Used   Substance and Sexual Activity     Alcohol use: Yes     Drug use: Never     Sexual activity: Not on file   Other Topics Concern     Not on file   Social History Narrative     Not on file     Social Determinants of Health     Financial Resource Strain: Not on file   Food Insecurity: Not on file   Transportation Needs: Not on file   Physical Activity: Not on file   Stress: Not on file   Social Connections: Not on file   Intimate Partner Violence: Not on file   Housing Stability: Not on file       MEDS:  See EMR, reviewed  ALL:  See EMR, reviewed    REVIEW OF SYSTEMS:  CONSTITUTIONAL:NEGATIVE for fever, chills, change in weight  INTEGUMENTARY/SKIN: NEGATIVE for worrisome rashes, moles or lesions  EYES: NEGATIVE for vision changes or irritation  ENT/MOUTH: NEGATIVE for ear, mouth and throat problems  RESP:NEGATIVE for significant cough or SOB  BREAST: NEGATIVE for masses, tenderness or discharge  CV: NEGATIVE for chest pain, palpitations or peripheral edema  GI: NEGATIVE for nausea, abdominal pain, heartburn, or change in bowel habits  :NEGATIVE for frequency, dysuria, or hematuria  :NEGATIVE for frequency, dysuria, or hematuria  NEURO: NEGATIVE for weakness, dizziness or paresthesias  ENDOCRINE: NEGATIVE for temperature intolerance, skin/hair changes  HEME/ALLERGY/IMMUNE: NEGATIVE for bleeding problems  PSYCHIATRIC: NEGATIVE for changes in mood or affect      Objective: No evidence of a hallux limitus or rigidus.  Passively he will allow approximately 60 degrees of dorsiflexion, with discomfort at extremes of dorsiflexion.  Passively he will allow 50 degrees of plantarflexion at the MTP.  He is nontender over the  sesamoids.  I do not find signs of anterior and posterior instability at the MTP on stress testing.  I do not find signs of medial or lateral instability at the MTP on stress testing.  He stands with his great toe flat on the floor, with no signs of developing contracture.    He has a normal-appearing arch without evidence of a pronated heel.  He is nontender along the course of the posterior tibial tendon.  The overlying skin is normal.  He is nontender at the area of Lisfranc.  Sensation is intact distally.  He describes focal discomfort limited to the bony midsection of his first metatarsal.  He is nontender at the navicular.  Nontender at the area of Lisfranc.  Overlying skin is normal.    I personally reviewed with the patient updated x-rays of the foot that show no bony abnormality or periosteal reaction along the midshaft of the first metatarsal.      Assessment: Great toe turf toe injury, left.  History of anxiety and depression.    Plan: We discussed that his work does have a seated job available.  Patient does not believe that he would be able to handle an 8 hour/day work schedule.  He also needs to continue to see physical therapy to avoid getting a stiff toe and to continue to advance his weightbearing status.  Therefore we agreed that for the next 4 weeks he will return to a 4-hour shift in the afternoons, and continue with his morning physical therapy visits.  I asked him to work on his outpatient protocol from physical therapy faithfully and to start transferring from the cam walker boot to a regular shoe, with his half steel toed insert, to avoid becoming too dependent on the cam walker boot.  I suspect he may have some arch discomfort from being in the flat cam walker boot.  He was provided with arch support to use inside the cam walker boot today.  Follow-up in 4 weeks for reevaluation of work restrictions.    Finally, he tells me that he has recently had a virtual visit with a mental health  provider and has been able to secure his current medicine refills.  He indicates that Lexapro has been helpful and Atarax has been helpful.  However he is not sure that this is a provider who is going to provide him ongoing mental health support over time.  He is wanting to find someone within his network because of the expense.  Therefore I put in referrals for both mental health therapist, and also psychiatry to assist with his medicine prescriptions and management.

## 2022-10-13 ENCOUNTER — VIRTUAL VISIT (OUTPATIENT)
Dept: BEHAVIORAL HEALTH | Facility: CLINIC | Age: 32
End: 2022-10-13
Attending: FAMILY MEDICINE
Payer: COMMERCIAL

## 2022-10-13 DIAGNOSIS — F43.10 POSTTRAUMATIC STRESS DISORDER: ICD-10-CM

## 2022-10-13 DIAGNOSIS — G47.00 INSOMNIA, UNSPECIFIED TYPE: ICD-10-CM

## 2022-10-13 DIAGNOSIS — F41.9 ANXIETY DISORDER, UNSPECIFIED TYPE: Primary | ICD-10-CM

## 2022-10-13 DIAGNOSIS — F32.A DEPRESSION, UNSPECIFIED DEPRESSION TYPE: ICD-10-CM

## 2022-10-13 PROCEDURE — 99215 OFFICE O/P EST HI 40 MIN: CPT | Mod: GT | Performed by: PSYCHIATRY & NEUROLOGY

## 2022-10-13 PROCEDURE — 99417 PROLNG OP E/M EACH 15 MIN: CPT | Mod: GT | Performed by: PSYCHIATRY & NEUROLOGY

## 2022-10-13 RX ORDER — MIRTAZAPINE 15 MG/1
15 TABLET, FILM COATED ORAL AT BEDTIME
Qty: 30 TABLET | Refills: 1 | Status: SHIPPED | OUTPATIENT
Start: 2022-10-13 | End: 2022-11-17

## 2022-10-13 RX ORDER — PROPRANOLOL HYDROCHLORIDE 20 MG/1
20-40 TABLET ORAL DAILY PRN
Qty: 60 TABLET | Refills: 0 | Status: SHIPPED | OUTPATIENT
Start: 2022-10-13 | End: 2022-10-13

## 2022-10-13 RX ORDER — MIRTAZAPINE 15 MG/1
15 TABLET, FILM COATED ORAL AT BEDTIME
Qty: 30 TABLET | Refills: 0 | Status: SHIPPED | OUTPATIENT
Start: 2022-10-13 | End: 2022-10-13

## 2022-10-13 RX ORDER — PROPRANOLOL HYDROCHLORIDE 20 MG/1
20-40 TABLET ORAL DAILY PRN
Qty: 60 TABLET | Refills: 1 | Status: SHIPPED | OUTPATIENT
Start: 2022-10-13 | End: 2022-11-17

## 2022-10-13 ASSESSMENT — ANXIETY QUESTIONNAIRES
GAD7 TOTAL SCORE: 21
6. BECOMING EASILY ANNOYED OR IRRITABLE: NEARLY EVERY DAY
4. TROUBLE RELAXING: NEARLY EVERY DAY
7. FEELING AFRAID AS IF SOMETHING AWFUL MIGHT HAPPEN: NEARLY EVERY DAY
3. WORRYING TOO MUCH ABOUT DIFFERENT THINGS: NEARLY EVERY DAY
5. BEING SO RESTLESS THAT IT IS HARD TO SIT STILL: NEARLY EVERY DAY
IF YOU CHECKED OFF ANY PROBLEMS ON THIS QUESTIONNAIRE, HOW DIFFICULT HAVE THESE PROBLEMS MADE IT FOR YOU TO DO YOUR WORK, TAKE CARE OF THINGS AT HOME, OR GET ALONG WITH OTHER PEOPLE: EXTREMELY DIFFICULT
1. FEELING NERVOUS, ANXIOUS, OR ON EDGE: NEARLY EVERY DAY
8. IF YOU CHECKED OFF ANY PROBLEMS, HOW DIFFICULT HAVE THESE MADE IT FOR YOU TO DO YOUR WORK, TAKE CARE OF THINGS AT HOME, OR GET ALONG WITH OTHER PEOPLE?: EXTREMELY DIFFICULT
GAD7 TOTAL SCORE: 21
7. FEELING AFRAID AS IF SOMETHING AWFUL MIGHT HAPPEN: NEARLY EVERY DAY
GAD7 TOTAL SCORE: 21
2. NOT BEING ABLE TO STOP OR CONTROL WORRYING: NEARLY EVERY DAY

## 2022-10-13 ASSESSMENT — PATIENT HEALTH QUESTIONNAIRE - PHQ9
SUM OF ALL RESPONSES TO PHQ QUESTIONS 1-9: 23
10. IF YOU CHECKED OFF ANY PROBLEMS, HOW DIFFICULT HAVE THESE PROBLEMS MADE IT FOR YOU TO DO YOUR WORK, TAKE CARE OF THINGS AT HOME, OR GET ALONG WITH OTHER PEOPLE: EXTREMELY DIFFICULT
SUM OF ALL RESPONSES TO PHQ QUESTIONS 1-9: 23

## 2022-10-13 NOTE — Clinical Note
Riccardo Conti, thanks for the referral! Unfortunately I only do the collaborative care model, doing consultations, med recommendations, and brief care, passing med prescribing back at the end.   It does sound like Victor Manuel needs a long term prescriber. I'll put in another long term referral for him at this time, but just wanted to let you know for the future!  If you ever do want a consult for med recs, please do refer to me!

## 2022-10-13 NOTE — PROGRESS NOTES
Victor Manuel Saavedra is a 32 year old who is being evaluated via a billable video visit.      Pt will join video visit via: [employer]  If there are problems joining the visit, send backup video invite via: Text to preferred phone: [phone]    Reason for telehealth visit: Patient has requested telehealth visit    Originating location (patient location): Patient's home    Will anyone else be joining the visit? No

## 2022-10-13 NOTE — Clinical Note
Jose Blackmon, not sure if you will get this message, but we saw Victor Manuel for a consult today. Please let us know if there are any questions, as you are set to see him in a few days.

## 2022-10-13 NOTE — PROGRESS NOTES
"Telehealth Details  Type of service:  video visit for consult  Date of service: Oct 13, 2022  Time of service:  Start Time:  3:46 PM  End Time:  5:00 PM    Reason for video visit:  Services only offered telehealth  Originating Site (patient location):  Patient's home  Distant Site (provider location):  [facility]  Mode of Communication:  Video conference via IPextreme       [facility]  MEDICATION MANAGEMENT CONSULTATION     Victor Manuel Saavedra is a 32 year old referred by Gallito Bonilla for evaluation of Depression and anxiety. Initial consultation on 10/13/22.     CARE TEAM:   PCP- Physician No Ref-Primary  Ortho- Gallito Bonilla  Therapist- None     Chief Complaint     Victor Manuel identified the reason for seeking services at this time as: \"Anxiety depression which are too hard to maintain due to current work injury\". Reported this as beginning approximately 05/13/22.     Assessment & Plan    History and interview support the following diagnoses:     Anxiety disorder, unspecified  Depressive disorder, unspecified  PTSD  Insomnia, unspecified    Victor Manuel reports significant anxiety and depressive symptoms that have been more prominent after a foot injury at work in May this year. It affected his mobility and ability to carry out every day activities as a result of painful distress. He describes anticipatory anxiety associated with certain tasks which leads to avoidance. Pertinent symptoms include loss of appetite with considerable weight loss, insomnia and difficulty in his overall functioning.  He feel his depressive symptoms are being driven by his anxiety.   He has not been satisfied with the support from his work place which has added on additional stress of legal proceedings against them and financial burden. There appears to be some unresolved trauma which has resurfaced at this time, which may explain the severity of the emotional reaction, " "and how it is so hard for him to cope with the stress of this situation.   Psychotherapy discussion: Primary recommendation for the treatment of anxiety. Trauma focused therapy would be helpful in addressing his unresolved trauma which has been triggered with his accident and seems to be  a perpetuating factor for his symptoms  Supportive therapy can be useful for reducing the impact of acute or chronic psychosocial stressors.   CBT can be particularly helpful for ruminative thinking and addressing maladaptive coping mechanisms that may worsen anxiety.   Medication discussion:   Primary mood support medications:   He will benefit from from serotonergic medications for anxiety, mood symptoms, and PTSD. Mirtazapine will be a good choice given his anxiety, poor appetite with weight loss, and insomnia. We have advised that he takes this scheduled every night  Other considerations include SSRI's which are first line treatment for anxiety, Given that his experience with escitalopram was not satisfactory, sertraline would be an ideal next option to try. Of note, it does not sound like escitalopram was clearly the cause of the tiredness issues he was experiencing, so trying it again at a more therapeutic dose and duration would also be an appropriate option.   Short acting anxiolytic / sleep medications:   Short acting anxiolytic meds are not effective at truly treating anxiety, but can help with getting out the door if panic symptoms / agoraphobia become more prominent.   Hydroxyzine 25- 50 mg as needed would be appropriate to continue he has found this somewhat helpful. Gabapentin is another simple option to use that will not reinforce anxiety too readily.   Quetiapine or olanzapine would be reasonable alternatives that may help with offsetting recent weight loss as well.   Antiadrenergic medications:   He reports \"anticipatory anxiety\", which is accompanied by autonomic arousal, \"fight or flight\". May benefit from " propranolol 20 - 40 mg prior to embarking on anxiety provoking task.   Other considerations include Guanfacine, clonidine, or prazosin.     [place] PRES[place] MONIT[place] [] was checked today: not using controlled substances    PSYCHOTROPIC DRUG INTERACTIONS:  Mirtazapine and Hydroxyzine- CNS  Depression  MANAGEMENT:  Monitoring for adverse effects     Plan     1) PSYCHOTROPIC MEDICATION RECOMMENDATIONS:  - Start mirtazapine 15 mg at bedtime  - May take propranolol 20 - 40 mg as needed for anticipatory anxiety  - May take hydroxyzine 25 -50 mg as needed for anxiety    2) THERAPY: Psychotherapy is a primary recommendation.   Currently not in therapy. Would likely benefit from trauma focused therapy  [place] Trauma Project: https://[email or web address].org/[place]-trauma[place]therapist[place]directory    3) NEXT DUE:   Labs- Routine monitoring is not indicated for current psychotropic medication regimen   ECG- Routine monitoring is not indicated for current psychotropic medication regimen   Rating Scales- N/A    4) REFERRALS / COORDINATION: This referral was accidentally placed as a consult, while it was intended for long term care. Unfortunately although this is a consult, referring provider will not be picking up the prescribing as is usual for the collaborative care model. We conveyed to the referring provider how to refer for long term care.   We placed the referral for long term care as a courtesy today.     5) DISPOSITION:   - Plan to follow up with alba COLLINS  CNP with [facility] 10/18/2022 1:00 pm    Treatment Risk Statement:  The patient understands the risks, benefits, adverse effects and alternatives. Agrees to treatment with the capacity to do so. No medical contraindications to treatment. Agrees to contact provider for any problems. The patient understands to call 911 or go to the nearest ED if urgent or life threatening symptoms occur. Crisis contact numbers are provided routinely in the  "After Visit Summary.       PROVIDER: Fermin Walter MD  RESIDENT:  Donna Alvarez MD    90 minutes face-to-face with Victor Manuel Saavedra during today s visit. Over 50% of this time was spent counseling the patient and / or coordinating care regarding diagnosis and follow up treatment.      Pertinent Background   Victor Manuel notes history of ADHD, Anxiety, Depression and PTSD in High School. This resulted in 1 suicide attempt at age 15. Symptoms first started due to bullying and multiple trauma.He also reports severe anxiety after a traumatic break up in 2018.     Initial diagnosis and treatment include Ritalin for ADHD and brief psychotherapy after the murder of his best friend and family in 2008. He did not get any treatment after the break up.  Psych pertinent item history includes suicide attempt  and trauma hx     History of Present Illness    Victor Manuel reports mental health concerns which has made it difficult for him to \"function as a human being\". These have been more prominent after a work injury on May 13th this year where he hurt his foot as he was trying to stand up from a \"disadvantaged position\". Since the injury he has been having difficulty with \"walking, sitting, washing dishes and laundry\" because of the pain in his foot.  He finds it painful to stand to cook and so has not been eating much, he reports about 20 lbs weight loss in the last 5 months.  He has pain from not eating but says his body has gone into \"fasting mode\". He mainly lives on protein drinks and occasional fast food which he eats only a little of.  His energy is low, this makes it hard for him to concentrate on things, he is a full time student and has not been able to keep up with his school work/assignments.  He does not feel supported by his work place and was not given pay along with his leave, this has added on financial stress, he is currently undertaking legal actions against them.  These have resulted in a lot of anxiety, last felt " "this anxious in 2018 after a traumatic break up.   He describes anxiety spikes which is associated with certain tasks. During these \"panic attacks\" he endorses increased respiration, HR . \"fight/flight\".. The episode could last throughout the day.  In July things came to a head and resulted in an EMPATH visit due to \"10/10\" anxiety. He was precribed Lexapro, Mirtazapine, Hydroxyzine as needed and Olanzapine. He did not refill the Lexapro after it ran out and has not really used the other medications.  His anxiety today is about a \"4-6/10\", where 10 is the worst  His mood is \"neutral\"  His sleep is poor and he finds himself counting down the hours and then agitated that he will not get enough rest before work.  He enjoys some video games  He has a history of physical and emotional trauma in childhood, as well a traumatic break up. He has been having flash backs and triggers more so now that his activity is limited due to his injury.  He does not have AH or VH  He does not have SI or HI  He has been drinking energy drinks to improve his energy, and using nicotine pouches about 5-6 a day. There is no other substance use    Recent Symptoms:   Depression: Change in sleep, Change in energy level, Difficulties concentrating and Change in appetite  Franca:  No Symptoms  Psychosis: No Symptoms  Anxiety: Excessive worry, Physical complaints, such as headaches, stomachaches, muscle tension, Sleep disturbance, Psychomotor agitation, Ruminations and Poor concentration  Panic:  Palpitations, Tremors, Shortness of breath and Triggers washing dishes, folding laundry- anything that gives him time to think  Post Traumatic Stress Disorder:  Experienced traumatic event in HS and following a break up in 2018, Hypervigilance, Increased arousal, Impaired functioning and flash backs    Eating Disorder: Weight change  ADD / ADHD:  Inattentive  Conduct Disorder: No symptoms  Autism Spectrum Disorder: No symptoms  Obsessive Compulsive " Disorder: No Symptoms    Patient reports the following compulsive behaviors and treatment history:  none .      Diagnostic Criteria:   Unspecified Anxiety Disorder , Symptoms characteristic of an anxiety disorder that caused clinically significant distress or impairment in social, occupational, or other important areas of functioning predominate but do not meet the full criteria for any of the disorders of the anxiety disorders diagnostic class. Post- Traumatic Stress Disorder  A. The person has been exposed to a traumatic event in which both of the following were present:  B. The traumatic event is persistently reexperienced in one (or more) of the following ways:  C. Persistent avoidance of stimuli associated with the trauma and numbing of general responsiveness (not present before the trauma), as indicated by three (or more) of the following:  D. Persistent symptoms of increased arousal (not present before the trauma), as indicated by two (or more) of the following:  E. Duration of the disturbance is more than 1 month.  F. The disturbance causes clinically significant distress or impairment in social, occupational, or other important areas of functioning.    Pertinent Social Hx:  FINANCIAL SUPPORT- working  LIVING SITUATION / RELATIONSHIPS- Lives with roommates  SOCIAL/ SPIRITUAL SUPPORT- Yes, somewhat from siblings    Pertinent Substance Use  Alcohol- None  Nicotine- Nicotine pouches about 5-8 day  Caffeine- lots, 2 energy drinks and a coffee in a day  Opioids- None  Narcan Kit- N/A  THC/CBD- No current use  Other Illicit Drugs- none     Substance Use History   Past Use- cannabis use in the past, alcohol use about 1-2 beer  Treatment [#, most recent]- None  Medical Consequences [withdrawal, sz etc]- None  Legal Consequences- None     Medical Review of Systems    Dizziness/orthostasis- Yes, because of reduced nutrition  Headaches-   GI- Mostly liquid diet, Grade 3 Hemorrhoids  Sexual health- HPV  A comprehensive  "review of systems was performed and is negative other than noted in the HPI.     Past Psychiatric History    Have you been previously diagnosed with any of these mental health condition(s)?: other, If other, please list below:: Dont know whats been diagnosed or not. Life of PTSD, depression and major anxiety. What mental health services have you received in the past?: other, If other, please list below:: Dont know Currently, are you receiving any of the following mental health services?: none    Self injurious behavior [method, most recent]- Scrattching at 14, an anxiety trigger  Suicide attempt [#, most recent, method]- 1 SA at age 15, drank alum stopped before finishing it, didn't tell anyone (\"major bullying\" was the trigger)  Suicidal ideation [passive, active]- Passive SI   What in the following list protects you from causing harm to yourself or others?: purpose;sense of personal control or determination, Are there firearms in your home?: are not    Psychosis hx- None  Psych hosp [ #, most recent, committed]- None, apart from EMPATH  ECT/TMS [#, most recent]- None    Eating disorder- None  Trauma history (self-report)- Yes  Outpatient programs [Day treatment, DBT, eating disorder tx, etc]- None     Psychotropic Medication Trials      Medication Max Dose (mg) Dates / Duration Helpful? DC Reason / Adverse Effects?   Ritalin  HS for months NO Parents were against medication   Trazodone  HS for 1 month No    Diphenhydramine  [employer] Yes Concerned for tolerance/ Sedation   Lexapro 10 July- August Not really Ran out   Are you taking/ not taking prescription medications as they were prescribed?: not taking, If no, please explain:: Lexapro made me sleepy after being denied it for several weeks after being first prescribed. Didnt have time to be tired like that anymore.   Social History                [per patient report]   Financial- What are your current financial sources?: employment;VA benefits, Does your " finances cause stress?: does  Employment- What is your employment status?: employed part time, Able to function?: no, If you work in a paying job or as a volunteer, describe the job and how long you have held it: : [employer]  three years in industry Did you serve in the ?: did  Living situation- What is your housing situation?: staying in own home/apartment  Feels safe at home- Yes  Household / family- Name: Pass, Age: Pass, Relationship: Sister, Living in same house?: no  Relationships- What is your current relationship status? : single, What is your sexual orientation?: heterosexual  Children- Do you have children?: yes, How many children do you have?: 2, Ages of boys:: Dont know, Ages of girls:: 2006 so . 16  Social/spiritual support- Who are the most supportive people in your life?  : no one  Cultural- What is your cultural background? : , What are ethnic, cultural, or [place] influences that may be useful to know about you (for example history of experiencing discrimination, growing up rural/urban, valuing culturally specific treatments)?  : Well I read history and white people are pretty jacked up sometimes when they don t have a historical or worldly view/background., What is your preferred language?  : English  Education- What is your highest education? : some college  Early history- Where did you grow up?: [facility], Who took care of you as a child?: biological mother  Raised by- How would you describe your parent's relationships?: one or both remarried, How old were you when they remarried?: Dont know dont care  Siblings- Do you have siblings?: yes, How many full siblings do you have?: 2, How many step siblings do you have?: 1  Quality of family relationships- How would you describe your current family relationships?: inconsistent  Legal- Have you been involved with the legal system (child custody, order for protection, DWI, etc.)?: have, If yes, please describe:: Pass,  "Do you have a ?  : does not     Family History    Sister with MDD, and ? PTSD multiple SA  Brother \"anger issues\"  Dad with depression     Past Medical History      Neurologic Hx [head injury, seizures, etc.]: 2 concussions in HS, 1 seizure around age 8  Patient Active Problem List   Diagnosis    Dysthymic Disorder    Traumatic Urethral Stricture    Fatigue    Tachycardia    Unspecified epilepsy without mention of intractable epilepsy    Fainting    Headache    Hematuria, unspecified    Urethral stricture unspecified      Medications      Current Outpatient Medications   Medication Sig Dispense Refill    cholecalciferol, vitamin D3, (CHOLECALCIFEROL) 400 unit Tab Take 400 Units by mouth daily      diclofenac (VOLTAREN) 1 % topical gel Apply 2 g topically 4 times daily 150 g 1    mirtazapine (REMERON) 15 MG tablet Take 15 mg by mouth At Bedtime      multivitamin (MULTIVITAMIN) per tablet Take 1 tablet by mouth daily      OLANZapine (ZYPREXA) 2.5 MG tablet Take 1 tablet (2.5 mg) by mouth At Bedtime for 14 days, THEN 2 tablets (5 mg) At Bedtime for 14 days. 42 tablet 0    dexamethasone (DECADRON) 4 MG/ML injection Use 4 mg or dose determined by provider for iontophoresis. (Patient not taking: No sig reported) 30 mL 0    diclofenac (VOLTAREN) 75 MG EC tablet Take 1 tablet (75 mg) by mouth 2 times daily Always take with food (Patient not taking: No sig reported) 60 tablet 0    escitalopram (LEXAPRO) 10 MG tablet Take 0.5 tablets (5 mg) by mouth daily for 7 days, THEN 1 tablet (10 mg) daily for 23 days. (Patient not taking: Reported on 9/16/2022) 27 tablet 0    hydrOXYzine (ATARAX) 25 MG tablet Take 1-2 tablets (25-50 mg) by mouth every 6 hours as needed for anxiety (Patient not taking: No sig reported) 30 tablet 0        Physical Exam  (Vitals Only)   There were no vitals taken for this visit.    Pulse Readings from Last 5 Encounters:   08/08/22 78   07/22/22 89   05/24/22 54     Wt Readings from Last 5 " "Encounters:   09/29/22 70.8 kg (156 lb)   09/01/22 70.8 kg (156 lb)   08/08/22 70.8 kg (156 lb)   07/22/22 68.5 kg (151 lb)   07/21/22 74.4 kg (164 lb)     BP Readings from Last 5 Encounters:   08/08/22 124/66   07/22/22 122/84   07/21/22 118/82   05/24/22 113/73      Mental Status Exam   Alertness: alert  and oriented  Appearance: adequately groomed  Behavior/Demeanor: cooperative and calm, with good eye contact   Speech: normal and regular rate and rhythm  Language: intact and no problems  Psychomotor: normal or unremarkable  Mood:  \"neutral\"  Affect: Somewhat aloof, more nonchalant than expressed, but tearful when triggered by a recollection ; not generally congruent to mood  Thought Process/Associations: unremarkable  Thought Content:  Reports none;  Denies suicidal ideation, violent ideation and delusions  Perception:  Reports none;  Denies auditory hallucinations and visual hallucinations  Insight: adequate and intact  Judgment: intact  Cognition: does  appear grossly intact; formal cognitive testing was done  Gait and Station: unremarkable     Data      PROMIS-10 Total Score w/o Sub Scores 10/13/2022   PROMIS TOTAL - SUBSCORES 11     CAGE-AID Total Score 6/8/2022   Total Score 4   Total Score MyChart 4 (A total score of 2 or greater is considered clinically significant)     PHQ-9 SCORE 9/16/2022 9/16/2022 10/13/2022   PHQ-9 Total Score MyChart - 22 (Severe depression) 23 (Severe depression)   PHQ-9 Total Score 21 22 23     STEPHANI-7 SCORE 6/8/2022 9/16/2022 10/13/2022   Total Score 21 (severe anxiety) - 21 (severe anxiety)   Total Score 21 18 21     "

## 2022-10-13 NOTE — PATIENT INSTRUCTIONS
[place] Trauma [place]:   [email or web address].org/[place]-trauma-therapist-directory      **For crisis resources, please see the information at the end of this document**   Patient Education    Thank you for coming to the [facility].     Lab Testing:  If you had lab testing today and your results are reassuring or normal they will be mailed to you or sent through [place] within 7 days. If the lab tests need quick action we will call you with the results. The phone number we will call with results is # [phone]. If this is not the best number please call our clinic and change the number.     Medication Refills:  If you need any refills please call your pharmacy and they will contact us. Our fax number for refills is [phone].   Three business days of notice are needed for general medication refill requests.   Five business days of notice are needed for controlled substance refill requests.   If you need to change to a different pharmacy, please contact the new pharmacy directly. The new pharmacy will help you get your medications transferred.     Contact Us:  Please call [phone] during business hours (8-5:00 M-F).   If you have medication related questions after clinic hours, or on the weekend, please call [phone].     Financial Assistance [phone]   Medical Records [phone]       MENTAL HEALTH CRISIS RESOURCES:  For a emergency help, please call 911 or go to the nearest Emergency Department.     Emergency Walk-In Options:   EmPATH Unit @ [facility] ([place]): [phone] - Specialized mental health emergency area designed to be calming  [facility] ([place]): [phone]  [facility] ([place]): [phone]  [facility]): [phone]    [facility] Crisis Information:   [place]: [phone]  [place]: [phone]  [place] ([place]) - Adult: [phone]     Child:  [phone]  [place] - Adult: [phone]     Child: [phone]  [place]: [phone]  List of all [facility] resources:   https://[place].gov/dhs/people-we-serve/adults/health-care/mental-health/resources/crisis-contacts.jsp    National Crisis Information:   Crisis Text Line: Text  [place]  to 239718  Suicide & Crisis Lifeline: 988  National Suicide Prevention Lifeline: 4-795-324-TALK (1-817.173.3403)       For online chat options, visit https://suicidepreventionlifeline.org/chat/  Poison Control Center: 1-435-858-3127  Trans Lifeline: 1-221.100.9172 - Hotline for transgender people of all ages  The [place] Project: 8-818-103-1877 - Hotline for LGBT youth     For Non-Emergency Support:   Fast Tracker: Mental Health & Substance Use Disorder Resources -   https://www.[employer].org/

## 2022-10-14 ENCOUNTER — DOCUMENTATION ONLY (OUTPATIENT)
Dept: ORTHOPEDICS | Facility: CLINIC | Age: 32
End: 2022-10-14

## 2022-10-14 DIAGNOSIS — S93.522A TURF TOE OF LEFT FOOT: ICD-10-CM

## 2022-10-14 DIAGNOSIS — S93.522A SPRAIN OF METATARSOPHALANGEAL JOINT OF LEFT GREAT TOE, INITIAL ENCOUNTER: Primary | ICD-10-CM

## 2022-10-14 DIAGNOSIS — S99.922A INJURY OF TOE ON LEFT FOOT: ICD-10-CM

## 2022-10-15 ENCOUNTER — MYC MEDICAL ADVICE (OUTPATIENT)
Dept: BEHAVIORAL HEALTH | Facility: CLINIC | Age: 32
End: 2022-10-15

## 2022-10-15 DIAGNOSIS — F43.10 POSTTRAUMATIC STRESS DISORDER: ICD-10-CM

## 2022-10-15 DIAGNOSIS — F32.A DEPRESSION, UNSPECIFIED DEPRESSION TYPE: ICD-10-CM

## 2022-10-15 DIAGNOSIS — F41.9 ANXIETY DISORDER, UNSPECIFIED TYPE: Primary | ICD-10-CM

## 2022-10-24 NOTE — TELEPHONE ENCOUNTER
Called Victor Manuel to follow up on his messages:   He has called 8 different clinics and got intake forms for 3 of them. He has been frustrated.   I explained that it is likely very frustrating to him that he was told that this referral that was made to see me was going to be for long term care, but instead ended up being just another consultation that did not provide him with long term support. I explained that if I had the resources to get him a long term provider, I would do this, but that my resources are very limited, being a consultant, with no social work support. He acknowledged that he is aware of this limitation and knows that we can't fix the situation readily for him.     I emphasized the importance of having a primary care provider to help to coordinate his needs. I placed a primary care referral for him at this time. I was unable to find a phone number for him to call for this referral, but did give him the number for the PCC at [facility] to get set up with a provider.     I informed him that we have a follow up scheduled with him now on 11/17 and will keep this unless he gets in somewhere else sooner.

## 2022-10-27 ENCOUNTER — OFFICE VISIT (OUTPATIENT)
Dept: ORTHOPEDICS | Facility: CLINIC | Age: 32
End: 2022-10-27
Payer: OTHER MISCELLANEOUS

## 2022-10-27 VITALS — BODY MASS INDEX: 19.4 KG/M2 | HEIGHT: 75 IN | WEIGHT: 156 LBS

## 2022-10-27 DIAGNOSIS — S97.82XS CRUSHING INJURY OF LEFT FOOT, SEQUELA: Primary | ICD-10-CM

## 2022-10-27 DIAGNOSIS — S93.522A TURF TOE OF LEFT FOOT: ICD-10-CM

## 2022-10-27 PROCEDURE — 99213 OFFICE O/P EST LOW 20 MIN: CPT | Performed by: FAMILY MEDICINE

## 2022-10-27 NOTE — PROGRESS NOTES
fup fup left great toe injury,  DOI 5/14/22    At our last visit 9/29/22 we agreed that for the next 4 weeks he will return to a 4-hour shift in the afternoons, and continue with his morning physical therapy visits.  I asked him to work on his outpatient protocol from physical therapy faithfully and to start transferring from the cam walker boot to a regular shoe, with his half steel toed insert, to avoid becoming too dependent on the cam walker boot.     In the interim I see he has had 2 visits with behavioral health, psychiatry, 10/13/2022 and 10/15/2022, Dr. Fermin Walter.  He tells me that he has an additional visit planned 10/31/2022.  He also tells me that he is in contact with the [facility].    He is here today with a [facility], Kaity Wilson [phone].  Phuong, certified athletic trainer is also present for today's visit.  He indicates that he has been consistent with a 4-hour shift at work in a seated job and is able to tolerate it.  But he describes persistent pain in the area of the great toe that keeps him from ambulating without the boot, and ambulation remains painful while in the boot. He tried a short time in a regular shoe with a half steel toed insert and it was too uncomfortable.  He points to the metatarsal shaft of the first toe as the focal area that was uncomfortable with bearing weight in the half steel toe insert.  He has been working on physical therapy for range of motion exercises.  He provided them with triamcinolone for phonophoresis but they have not tried it yet.  He is planning on returning for this.  He works on range of motion exercises for the great toe at home.  He denies numbness and tingling in the foot.        EXAM: MR FOOT LEFT W/O CONTRAST  LOCATION: [facility]  DATE/TIME: 8/2/2022 11:17 AM     INDICATION: Left great toe injury, persistent pain despite 2 months of conservative management (post op shoe, NSAIDS, ice, PT, relative rest). Concern for  bone stress injury.  COMPARISON: None.  TECHNIQUE: Unenhanced.     FINDINGS:      JOINTS AND BONES:   -There is probably mild marrow edema versus less likely signal artifact involving the distal phalanges of the first through fourth toes, most marked along the distal whit (series 4 images 10, 15, 19, 22). No other bony abnormality. No joint abnormality.   No joint effusion or synovitis.     TENDONS:   -No tendon tear, tendinopathy, or tenosynovitis.      LIGAMENTS:   -Lisfranc ligament: Intact. No subluxation.     MUSCLES AND SOFT TISSUES:   -No muscle atrophy or edema. No soft tissue mass or fluid collection.                                                                      IMPRESSION:  1.  Mild marrow edema involving the distal phalanges of the first through fourth toes. This is nonspecific, acute traumatic or repetitive contusions most likely.  2.  No additional abnormality.        PMH:  Active problem list:  Patient Active Problem List   Diagnosis     Dysthymic Disorder     Traumatic Urethral Stricture     Fatigue     Tachycardia     Unspecified epilepsy without mention of intractable epilepsy     Fainting     Headache     Hematuria, unspecified     Urethral stricture unspecified       FH:  No family history on file.    SH:  Social History     Socioeconomic History     Marital status: Single     Spouse name: Not on file     Number of children: Not on file     Years of education: Not on file     Highest education level: Not on file   Occupational History     Not on file   Tobacco Use     Smoking status: Some Days     Smokeless tobacco: Never   Substance and Sexual Activity     Alcohol use: Yes     Drug use: Never     Sexual activity: Not on file   Other Topics Concern     Not on file   Social History Narrative     Not on file     Social Determinants of Health     Financial Resource Strain: Not on file   Food Insecurity: Not on file   Transportation Needs: Not on file   Physical Activity: Not on file   Stress:  Not on file   Social Connections: Not on file   Intimate Partner Violence: Not on file   Housing Stability: Not on file       MEDS:  See EMR, reviewed  ALL:  See EMR, reviewed    REVIEW OF SYSTEMS:  CONSTITUTIONAL:NEGATIVE for fever, chills, change in weight  INTEGUMENTARY/SKIN: NEGATIVE for worrisome rashes, moles or lesions  EYES: NEGATIVE for vision changes or irritation  ENT/MOUTH: NEGATIVE for ear, mouth and throat problems  RESP:NEGATIVE for significant cough or SOB  BREAST: NEGATIVE for masses, tenderness or discharge  CV: NEGATIVE for chest pain, palpitations or peripheral edema  GI: NEGATIVE for nausea, abdominal pain, heartburn, or change in bowel habits  :NEGATIVE for frequency, dysuria, or hematuria  :NEGATIVE for frequency, dysuria, or hematuria  NEURO: NEGATIVE for weakness, dizziness or paresthesias  ENDOCRINE: NEGATIVE for temperature intolerance, skin/hair changes  HEME/ALLERGY/IMMUNE: NEGATIVE for bleeding problems  PSYCHIATRIC: NEGATIVE for changes in mood or affect      Objective: Today he will allow me to dorsiflex and volar flex the great toe at the MTP without signs of pain.  This is an improvement from previous visits.  He does have full passive range of motion at the digit with no signs of stiffness.  There is no tenderness over the medial or lateral sesamoid under the great toe.  He describes discomfort along the volar aspect of the midshaft of the first metatarsal.  Skin overlying here is intact.  There is no swelling over the foot.  The color of the skin is normal,. Sensation is normal to light touch throughout the foot along the volar and dorsal surfaces.  Strength is intact of the foot to dorsiflexion, volar flexion, extending and flexing the toes against resistance.    Assessment: Turf toe injury of the left great toe with a history of a crush injury 6 months ago    Plan: EMG of the left lower extremity is pending to evaluate for peripheral nerve injury 6 months ago at the level of  the foot.  I encouraged him to remove the cam walker boot at home.  He can continue to wear it for work.  I encouraged him to continue with the passive range of motion exercises for the great toe.  I congratulated the patient on following up with psychiatry and mental health.  He will continue to keep their visits with him, as planned.  We will continue with the 4-hour work shift in the seated position for the next month.

## 2022-10-27 NOTE — LETTER
Date:October 28, 2022      Patient was self referred, no letter generated. Do not send.        [facility] Health Information

## 2022-10-27 NOTE — LETTER
10/27/2022    Victor Manuel Saavedra  [address] [place] [address]  [phone] (home)     :     1990      To Whom it May Concern:    This patient was seen today in follow-up for his left great toe injury.   From 10/27/22 to 22 he will continue to be limited to a 4-hour shift only, from 12 PM to 4 PM.  He will be limited to a seated job during these 4-hour shifts.  He will continue with his physical therapy program in the mornings during this time.  He will continue with his cam walker boot at work.  He will be seen again in a month for advancing his work restrictions.    Sincerely,        Gallito Bonilla MD

## 2022-10-27 NOTE — LETTER
10/27/2022      RE: Victor Manuel Saavedra  [address] [place] [address]     Dear Colleague,    Thank you for referring your patient, Victor Manuel Saavedra, to the [facility] [place]. Please see a copy of my visit note below.    fup fup left great toe injury,  DOI 5/14/22    At our last visit 9/29/22 we agreed that for the next 4 weeks he will return to a 4-hour shift in the afternoons, and continue with his morning physical therapy visits.  I asked him to work on his outpatient protocol from physical therapy faithfully and to start transferring from the cam walker boot to a regular shoe, with his half steel toed insert, to avoid becoming too dependent on the cam walker boot.     In the interim I see he has had 2 visits with behavioral health, psychiatry, 10/13/2022 and 10/15/2022, Dr. Fermin Walter.  He tells me that he has an additional visit planned 10/31/2022.  He also tells me that he is in contact with the [facility].    He is here today with a [facility], Kaity Wilson [phone].  Phuong, certified athletic trainer is also present for today's visit.  He indicates that he has been consistent with a 4-hour shift at work in a seated job and is able to tolerate it.  But he describes persistent pain in the area of the great toe that keeps him from ambulating without the boot, and ambulation remains painful while in the boot. He tried a short time in a regular shoe with a half steel toed insert and it was too uncomfortable.  He points to the metatarsal shaft of the first toe as the focal area that was uncomfortable with bearing weight in the half steel toe insert.  He has been working on physical therapy for range of motion exercises.  He provided them with triamcinolone for phonophoresis but they have not tried it yet.  He is planning on returning for this.  He works on range of motion exercises for the great toe at home.  He denies numbness and tingling in the foot.        EXAM:  MR FOOT LEFT W/O CONTRAST  LOCATION: [facility]  DATE/TIME: 8/2/2022 11:17 AM     INDICATION: Left great toe injury, persistent pain despite 2 months of conservative management (post op shoe, NSAIDS, ice, PT, relative rest). Concern for bone stress injury.  COMPARISON: None.  TECHNIQUE: Unenhanced.     FINDINGS:      JOINTS AND BONES:   -There is probably mild marrow edema versus less likely signal artifact involving the distal phalanges of the first through fourth toes, most marked along the distal whit (series 4 images 10, 15, 19, 22). No other bony abnormality. No joint abnormality.   No joint effusion or synovitis.     TENDONS:   -No tendon tear, tendinopathy, or tenosynovitis.      LIGAMENTS:   -Lisfranc ligament: Intact. No subluxation.     MUSCLES AND SOFT TISSUES:   -No muscle atrophy or edema. No soft tissue mass or fluid collection.                                                                      IMPRESSION:  1.  Mild marrow edema involving the distal phalanges of the first through fourth toes. This is nonspecific, acute traumatic or repetitive contusions most likely.  2.  No additional abnormality.        PMH:  Active problem list:  Patient Active Problem List   Diagnosis     Dysthymic Disorder     Traumatic Urethral Stricture     Fatigue     Tachycardia     Unspecified epilepsy without mention of intractable epilepsy     Fainting     Headache     Hematuria, unspecified     Urethral stricture unspecified       FH:  No family history on file.    SH:  Social History     Socioeconomic History     Marital status: Single     Spouse name: Not on file     Number of children: Not on file     Years of education: Not on file     Highest education level: Not on file   Occupational History     Not on file   Tobacco Use     Smoking status: Some Days     Smokeless tobacco: Never   Substance and Sexual Activity     Alcohol use: Yes     Drug use: Never     Sexual activity: Not on file    Other Topics Concern     Not on file   Social History Narrative     Not on file     Social Determinants of Health     Financial Resource Strain: Not on file   Food Insecurity: Not on file   Transportation Needs: Not on file   Physical Activity: Not on file   Stress: Not on file   Social Connections: Not on file   Intimate Partner Violence: Not on file   Housing Stability: Not on file       MEDS:  See EMR, reviewed  ALL:  See EMR, reviewed    REVIEW OF SYSTEMS:  CONSTITUTIONAL:NEGATIVE for fever, chills, change in weight  INTEGUMENTARY/SKIN: NEGATIVE for worrisome rashes, moles or lesions  EYES: NEGATIVE for vision changes or irritation  ENT/MOUTH: NEGATIVE for ear, mouth and throat problems  RESP:NEGATIVE for significant cough or SOB  BREAST: NEGATIVE for masses, tenderness or discharge  CV: NEGATIVE for chest pain, palpitations or peripheral edema  GI: NEGATIVE for nausea, abdominal pain, heartburn, or change in bowel habits  :NEGATIVE for frequency, dysuria, or hematuria  :NEGATIVE for frequency, dysuria, or hematuria  NEURO: NEGATIVE for weakness, dizziness or paresthesias  ENDOCRINE: NEGATIVE for temperature intolerance, skin/hair changes  HEME/ALLERGY/IMMUNE: NEGATIVE for bleeding problems  PSYCHIATRIC: NEGATIVE for changes in mood or affect      Objective: Today he will allow me to dorsiflex and volar flex the great toe at the MTP without signs of pain.  This is an improvement from previous visits.  He does have full passive range of motion at the digit with no signs of stiffness.  There is no tenderness over the medial or lateral sesamoid under the great toe.  He describes discomfort along the volar aspect of the midshaft of the first metatarsal.  Skin overlying here is intact.  There is no swelling over the foot.  The color of the skin is normal,. Sensation is normal to light touch throughout the foot along the volar and dorsal surfaces.  Strength is intact of the foot to dorsiflexion, volar flexion,  extending and flexing the toes against resistance.    Assessment: Turf toe injury of the left great toe with a history of a crush injury 6 months ago    Plan: EMG of the left lower extremity is pending to evaluate for peripheral nerve injury 6 months ago at the level of the foot.  I encouraged him to remove the cam walker boot at home.  He can continue to wear it for work.  I encouraged him to continue with the passive range of motion exercises for the great toe.  I congratulated the patient on following up with psychiatry and mental health.  He will continue to keep their visits with him, as planned.  We will continue with the 4-hour work shift in the seated position for the next month.                          Again, thank you for allowing me to participate in the care of your patient.      Sincerely,    Galliot Bonilla MD

## 2022-10-31 ENCOUNTER — DOCUMENTATION ONLY (OUTPATIENT)
Dept: ORTHOPEDICS | Facility: CLINIC | Age: 32
End: 2022-10-31

## 2022-10-31 ENCOUNTER — HOSPITAL ENCOUNTER (OUTPATIENT)
Dept: PHYSICAL THERAPY | Facility: REHABILITATION | Age: 32
Discharge: HOME | End: 2022-10-31
Payer: OTHER MISCELLANEOUS

## 2022-10-31 DIAGNOSIS — S93.522A TURF TOE OF LEFT FOOT: Primary | ICD-10-CM

## 2022-10-31 DIAGNOSIS — M25.675 DECREASED ROM OF LEFT FOOT: ICD-10-CM

## 2022-10-31 DIAGNOSIS — R26.89 ANTALGIC GAIT: ICD-10-CM

## 2022-10-31 PROCEDURE — 97112 NEUROMUSCULAR REEDUCATION: CPT | Mod: GP | Performed by: PHYSICAL THERAPIST

## 2022-10-31 PROCEDURE — 97033 APP MDLTY 1+IONTPHRSIS EA 15: CPT | Mod: GP | Performed by: PHYSICAL THERAPIST

## 2022-10-31 NOTE — PROGRESS NOTES
10/31/22 PT message:  Mary Martinez, PT  Gallito Bonilla MD  Good Morning,     I was reading your last note and I saw patient reported he had supplied us with the medication to complete iontophoresis. He canceled and no showed his last 2 visits so he has not attended PT since 09/12/2022.  He has not been in since I recommended trial of that medication to see if it works.  He does have an appointment scheduled this AM.  If he does not show he will be discharged due to cancellation/no show policy and will need a new referral to return.  Let me know if you have any questions or concerns.  Have a great day.     Thank You,     Mary Martinez PT

## 2022-11-02 ENCOUNTER — TELEPHONE (OUTPATIENT)
Dept: ORTHOPEDICS | Facility: CLINIC | Age: 32
End: 2022-11-02

## 2022-11-02 NOTE — TELEPHONE ENCOUNTER
[facility] Call Center    Phone Message    May a detailed message be left on voicemail: yes     Reason for Call: Other: Pt is having severe pain in his left foot has sent [employer] messages but just needs to speak with a nurse for options     Action Taken: Other: ortho     Travel Screening: Not Applicable

## 2022-11-03 ENCOUNTER — DOCUMENTATION ONLY (OUTPATIENT)
Dept: ORTHOPEDICS | Facility: CLINIC | Age: 32
End: 2022-11-03

## 2022-11-03 NOTE — PROGRESS NOTES
Nettehart Response 11/2/2022 1:19 pm    Jose Rush:     I looked at the pictures you sent.  Do you think this could be some skin discoloration from the phonophoresis, with the cortisone, that they did at the physical therapists office?  Is that the exact spot where they put the medicine before they used the phonophoresis machine?  If it's the exact spot, the skin may just be irritated from that, and I would have to say that phonophoresis should be avoided next time, if it tends to irritate your skin.  The irritated skin should slowly improve on it's own over the upcoming days, as you get further away from your physical therapy visit.     If that's not where they put the medicine, it is hard to from a picture what is going on with the skin.  To be safe I would have you stop into an urgent care to have someone look at it with their eyes, and make sure there is no infection.     Dr. Bonilla

## 2022-11-03 NOTE — PROGRESS NOTES
Jennifer note 11/3/2022 2:03 pm    Jose Rush:    It is hard for me to tell from the photographs.  It seems to be irritated skin by what I can see from the photos.  And it is circular in shape, and it seems to be where I would have expected the physical therapist to have put the medicine, and the transducer for the machine, when they did the phonophoresis.  I think my best advice, if it seems to not be improving compared to yesterday, would be for you to have it looked at in an urgent care, where they can see it in person.  Hopefully they won't be as busy as yesterday.    Dr. Bonilla

## 2022-11-04 ENCOUNTER — HOSPITAL ENCOUNTER (OUTPATIENT)
Dept: NUTRITION | Facility: CLINIC | Age: 32
Discharge: HOME | End: 2022-11-04
Attending: NURSE PRACTITIONER | Admitting: NURSE PRACTITIONER
Payer: COMMERCIAL

## 2022-11-04 DIAGNOSIS — R63.0 ANOREXIA: ICD-10-CM

## 2022-11-04 PROCEDURE — 97802 MEDICAL NUTRITION INDIV IN: CPT | Mod: GT,95 | Performed by: DIETITIAN, REGISTERED

## 2022-11-04 NOTE — DISCHARGE INSTRUCTIONS
Buy bread to use with current jar of peanut butter, canister of oatmeal, dried fruit, frozen entrees  Eat 5-6 times per day to re-establish hunger cues   Reduce multivitamins to 1 per day

## 2022-11-04 NOTE — PROGRESS NOTES
"Originating Location (pt. Location): Home        Distant Location (provider location):  On-site    Mode of Communication:  Video Conference via [employer]-switched to telephone as unable stay connected to patient     OUTPATIENT NUTRITION ASSESSMENT (VIDEO VISIT DUE TO COVID-19)  REASON FOR ASSESSMENT  Victor Manuel BETTYE Carlsonon referred by Paloma Veras NP for MNT related to Anorexia [R63.0]  - Primary   Patient accompanied by self    ASSESSMENT   Nutrition History:  - Information obtained from patient.  - Patient is struggling with intake.  Towards the end of appointment patient states it was recommended by the [facility] that patient be inpatient for eating disorder treatment.  Patient states had a work injury and has not been eating due to pain.  Patient is not able to stand to prepare meals.  Patient with food insecurity due to work injury and ability to work.     Patient states taking 3-6 multivitamins per day.    Peanut butter   Foods available in home: peanut butter, olives, protein powder, egg noodles, barely, maple syrup, milagro seeds      Grocery shopping 1 time per week ([employer])     Patient likes quinoa, burger and steak     Diet Recall:  peanuts -small amount   Ramen  Muscle Milk -2-3 per day; Boost Shake 3-5 per day (20 grams protein, 250 kcals)   Beverages: red bull -1-2 per day; coffee -2 cups (16 oz) and water   Dining out: fast food -[employer] will only be able to eat 1/2 portion of sandwich      NUTRITION FOCUSED PHYSICAL ASSESSMENT (NFPA) FOR DIAGNOSING MALNUTRITION  No due to inability to connect via video -only showed partial face and ceiling           Obtained from Chart/Interdisciplinary Team:  Anorexia     LABS  Labs reviewed    MEDICATIONS  Medications reviewed    ANTHROPOMETRICS   Height: 6'3\"  Weight: 156 lbs   BMI (kg/m2): 19.4 kg/m2   Weight Status:  Normal BMI  %IBW: 80%  Weight History:   Wt Readings from Last 10 Encounters:   10/27/22 70.8 kg (156 lb)   09/29/22 70.8 kg (156 lb) "   09/01/22 70.8 kg (156 lb)   08/08/22 70.8 kg (156 lb)   07/22/22 68.5 kg (151 lb)   07/21/22 74.4 kg (164 lb)   06/29/22 74.4 kg (164 lb)   06/08/22 74.4 kg (164 lb)   05/24/22 74.4 kg (164 lb)     170 lbs UBW (Patient with 8% weight loss in 6 months)     ASSESSED NUTRITION NEEDS  Estimated Energy Needs: 7844-6525 kcals/day (25-30 Kcal/Kg)  Justification:  (repletion)  Estimated Protein Needs: 70-84 grams protein/day (1-1.2 g pro/Kg)  Justification:  (Repletion)  Estimated Fluid Needs: 1965-4777 mL/day (30-35 mL/kg)    ASSESSED MALNUTRITION STATUS  % Weight Loss:  Weight loss does not meet criteria for malnutrition (8% weight loss in 6 months)   % Intake:  </= 75% for >/= 1 month (severe malnutrition)  Subcutaneous Fat Loss:  None observed to limited view  Loss of Muscle Mass:  None observed to limited view  Fluid Retention:  None noted due to limited view    Malnutrition Diagnosis:  Unable to determine due to no NFPA    DIAGNOSIS   Nutrition Diagnosis: Disordered eating pattern related to inability to eat due to pain as evidenced by restricted eating pattern and unable to cook due to ability to stand     INTERVENTIONS   Nutrition Prescription   Recommend 5-6 eating time to resume hunger cues     IMPLEMENTATION   Assessed learning needs and learning preference  Teaching Method(s) used: Booklet / Handout  Explanation  Vitamin and Mineral Supplements: recommend 1 multivitamin per day (takes 3-6 per day)  Diet Education:  Provided education on normalized eating pattern   Nutrition Education (Content):   a)  Discussed current eating pattern.  Reviewed food options patient has available to eat due to limited funds for food.  Encouraged patient to eat 5-6 times per day at consistent times to help resume hunger cues.  Explained hunger cues have diminished due to lack of eating.  Also reminded body requires calories for energy not caffeine as drinks 3-4 servings of caffeine per day.  Supported patient with the challenge  of trying to increase intake with limited hunger.    b)  Provided Six Small Meals per Day handout   Nutrition Education (Application):   a)  Discussed current eating plans and offered suggestions with limited cooking requirement.    b)  Patient verbalizes understanding of diet by stating will buy bread.   Expected patient engagement: fair (Patient with limited reception to food suggestions for patient with limited cooking time on feet and low cost foods)   Other: Recommend eating disorder therapist.     GOALS  Buy bread, oatmeal, dried fruit, frozen entrees  Eat 5-6 times per day   Reduce multivitamins to 1 per day     FOLLOW UP/MONITORING   Progress towards goals will be monitored and evaluated per protocol and Practice Guidelines  Patient to follow up in 4 weeks  RD name and number provided     Time Spent with Patient  35 minutes (patient was interrupted by landlord and maintenance x 2 during appointment-patient did not remember RD was on phone)     Tod Noonan, RD, LD  [facility] Outpatient Dietitian  [phone] (office phone)

## 2022-11-07 ENCOUNTER — HOSPITAL ENCOUNTER (OUTPATIENT)
Dept: PHYSICAL THERAPY | Facility: REHABILITATION | Age: 32
Discharge: HOME | End: 2022-11-07
Payer: OTHER MISCELLANEOUS

## 2022-11-07 ENCOUNTER — TELEPHONE (OUTPATIENT)
Dept: NUTRITION | Facility: CLINIC | Age: 32
End: 2022-11-07

## 2022-11-07 DIAGNOSIS — S93.522A TURF TOE OF LEFT FOOT: Primary | ICD-10-CM

## 2022-11-07 DIAGNOSIS — R26.89 ANTALGIC GAIT: ICD-10-CM

## 2022-11-07 DIAGNOSIS — M25.675 DECREASED ROM OF LEFT FOOT: ICD-10-CM

## 2022-11-07 PROCEDURE — 97033 APP MDLTY 1+IONTPHRSIS EA 15: CPT | Mod: GP | Performed by: PHYSICAL THERAPIST

## 2022-11-07 PROCEDURE — 97112 NEUROMUSCULAR REEDUCATION: CPT | Mod: GP | Performed by: PHYSICAL THERAPIST

## 2022-11-07 NOTE — PROGRESS NOTES
Returned patient's phone call from 11/5/2022.  Patient states did not remember calling RD.  Patient asked if there are additional resources. Patient asking if there are dietitians that are also therapists.  RD explained there are not in the [facility] to RD knowledge.  RD asked for clarification if patient wanted to lose weight as informed RD at appointment that the [facility] recommended inpatient programming.  Patient stated expletive that he is trying to gain weight as lost 20 lbs and trying to get healthy.  RD explained additional information will be sent on high calorie, high protein recipes so patient can add more foods. Also will send Coping with Anorexia and Weight Loss, Tips to increase calories and Tips to increase protein in diet.  Patient asked if RD able to see notes from the [facility]. RD informed patient that unable to access records as not in the system.  Patient stated expletive and hung up phone.     Tod Noonan, RD, LD  [facility] Outpatient Dietitian  [phone] (office phone)

## 2022-11-10 ENCOUNTER — OFFICE VISIT (OUTPATIENT)
Dept: ORTHOPEDICS | Facility: CLINIC | Age: 32
End: 2022-11-10
Payer: OTHER MISCELLANEOUS

## 2022-11-10 ENCOUNTER — ANCILLARY PROCEDURE (OUTPATIENT)
Dept: GENERAL RADIOLOGY | Facility: CLINIC | Age: 32
End: 2022-11-10
Attending: FAMILY MEDICINE
Payer: OTHER MISCELLANEOUS

## 2022-11-10 DIAGNOSIS — M79.672 LEFT FOOT PAIN: ICD-10-CM

## 2022-11-10 DIAGNOSIS — G57.72 CAUSALGIA OF LEFT LOWER EXTREMITY: ICD-10-CM

## 2022-11-10 DIAGNOSIS — S97.82XS CRUSHING INJURY OF LEFT FOOT, SEQUELA: ICD-10-CM

## 2022-11-10 DIAGNOSIS — S97.82XS CRUSHING INJURY OF LEFT FOOT, SEQUELA: Primary | ICD-10-CM

## 2022-11-10 PROCEDURE — 99213 OFFICE O/P EST LOW 20 MIN: CPT | Performed by: FAMILY MEDICINE

## 2022-11-10 PROCEDURE — 73630 X-RAY EXAM OF FOOT: CPT | Mod: LT | Performed by: RADIOLOGY

## 2022-11-10 NOTE — LETTER
11/10/2022    Victor Manuel Saavedra  [address] [place] [address]  [phone] (home)     :     1990      To Whom it May Concern:    This patient was seen today in follow-up for his left great toe injury.   From 11/10/22 to 12/10/22 he will continue to be limited to a 4-hour shift only, from 12 PM to 4 PM.  He will be limited to a seated job during these 4-hour shifts.  He will continue with his physical therapy program during this time.  He will continue with his cam walker boot at work.  He will be seen by the pain clinic and occupational medicine.      Sincerely,        Gallito Bonilla MD

## 2022-11-10 NOTE — LETTER
Date:November 11, 2022      Patient was self referred, no letter generated. Do not send.        [facility] Health Information

## 2022-11-10 NOTE — LETTER
11/10/2022      RE: Victor Manuel Saavedra  [facility] [place] [address]     Dear Colleague,    Thank you for referring your patient, Victor Manuel Saavedra, to the [facility] [place]. Please see a copy of my visit note below.        Victor Manuel Saavedra is here today with a [facility], Kaity Katie [phone]. Pavan, certified athletic trainer is also present for today's visit.    Patient continues to be consistent in his area of discomfort which is over the dorsum of the shaft of the first toe metatarsal, to the dorsum of the first MTP.  However since the last visit he indicates that there was an episode of swelling over the dorsum of the foot associated with redness.  The redness and swelling have now resolved, but he does have pictures of the redness on his phone.  It seems to be a circumscribed area of redness about the size of quarter that was localized between the area of the second and third interspace near the distal metatarsals.  Skin overlying that area there is normal today.  I do not see swelling today in this area.    The pain continues to keep him from weightbearing without the use of the boot.  He does remove the boot for physical therapy and for his range of motion exercises for the toe.  He has been consistent in this.  Physical therapy did try phonophoresis.  The specific area where physical therapy did phonophoresis was over the dorsum of the MTP.  So the cortisone was not placed in the area where the redness appeared between the interspace of the 2 toes.    He indicates that he has been consistent with a 4-hour shift at work in a seated job and is able to tolerate it.  But he describes persistent pain in the area of the great toe that keeps him from ambulating without the boot, and ambulation remains painful while in the boot. He tried a short time in a regular shoe with a half steel toed insert and it was too uncomfortable.  He points to the metatarsal shaft of the first  toe as the focal area that was uncomfortable with bearing weight He has been. He works on range of motion exercises for the great toe at home.  He denies numbness and tingling in the foot.      H/O acute left foot injury 5/14/2022:  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  As he did so, his left great toe at the MTP was forced into plantarflexion, as the dorsum of the MTP happened to be levered against a cross beam concomitantly.   Has had pain over the dorsum of his large toe since.              3 views left foot radiographs 9/29/2022 2:14 PM     History: AP, lateral and oblique; Left foot pain     Comparison: 5/24/2022, MR 8/2/2022     Findings:     Standing AP, oblique, and lateral  views of the left foot were  obtained.      No acute osseous abnormality.       Lisfranc articulation alignment is congruent on these non-weight  bearing images.     No substantial degenerative change. Presumed bone island of the first  metatarsal head.     Soft tissue is unremarkable.                                                                      Impression:  1. No acute osseous abnormality.  2. No substantial degenerative change.     EDUAR MARTIN         EXAM: MR FOOT LEFT W/O CONTRAST  LOCATION: [facility]  DATE/TIME: 8/2/2022 11:17 AM     INDICATION: Left great toe injury, persistent pain despite 2 months of conservative management (post op shoe, NSAIDS, ice, PT, relative rest). Concern for bone stress injury.  COMPARISON: None.  TECHNIQUE: Unenhanced.     FINDINGS:      JOINTS AND BONES:   -There is probably mild marrow edema versus less likely signal artifact involving the distal phalanges of the first through fourth toes, most marked along the distal whit (series 4 images 10, 15, 19, 22).  No other bony abnormality. No joint abnormality.   No joint effusion or synovitis.     TENDONS:   -No tendon tear, tendinopathy, or tenosynovitis.      LIGAMENTS:   -Lisfranc ligament: Intact. No subluxation.     MUSCLES AND SOFT TISSUES:   -No muscle atrophy or edema. No soft tissue mass or fluid collection.                                                                      IMPRESSION:  1.  Mild marrow edema involving the distal phalanges of the first through fourth toes. This is nonspecific, acute traumatic or repetitive contusions most likely.  2.  No additional abnormality.        PMH:  Procedure /Surgery  Excision of capillary hemangioma left ankle  Date of Procedure/Surgery- 4/12/17 / [facility]    Active problem list:  Patient Active Problem List   Diagnosis     Dysthymic Disorder     Traumatic Urethral Stricture     Fatigue     Tachycardia     Unspecified epilepsy without mention of intractable epilepsy     Fainting     Headache     Hematuria, unspecified     Urethral stricture unspecified       FH:  No family history on file.    SH:  Social History     Socioeconomic History     Marital status: Single     Spouse name: Not on file     Number of children: Not on file     Years of education: Not on file     Highest education level: Not on file   Occupational History     Not on file   Tobacco Use     Smoking status: Some Days     Smokeless tobacco: Never   Substance and Sexual Activity     Alcohol use: Yes     Drug use: Never     Sexual activity: Not on file   Other Topics Concern     Not on file   Social History Narrative     Not on file     Social Determinants of Health     Financial Resource Strain: Not on file   Food Insecurity: Not on file   Transportation Needs: Not on file   Physical Activity: Not on file   Stress: Not on file   Social Connections: Not on file   Intimate Partner Violence: Not on file   Housing Stability: Not on file       MEDS:  See EMR, reviewed  ALL:  See EMR, reviewed    REVIEW  OF SYSTEMS:  CONSTITUTIONAL:NEGATIVE for fever, chills, change in weight  INTEGUMENTARY/SKIN: NEGATIVE for worrisome rashes, moles or lesions  EYES: NEGATIVE for vision changes or irritation  ENT/MOUTH: NEGATIVE for ear, mouth and throat problems  RESP:NEGATIVE for significant cough or SOB  BREAST: NEGATIVE for masses, tenderness or discharge  CV: NEGATIVE for chest pain, palpitations or peripheral edema  GI: NEGATIVE for nausea, abdominal pain, heartburn, or change in bowel habits  :NEGATIVE for frequency, dysuria, or hematuria  :NEGATIVE for frequency, dysuria, or hematuria  NEURO: NEGATIVE for weakness, dizziness or paresthesias  ENDOCRINE: NEGATIVE for temperature intolerance, skin/hair changes  HEME/ALLERGY/IMMUNE: NEGATIVE for bleeding problems  PSYCHIATRIC: NEGATIVE for changes in mood or affect        Objective: Today I do not see any skin discoloration or swelling over the dorsum of the foot.  He remains tender broadly along the course of the metatarsal of the first digit but he would allow full dorsiflexion and full volar flexion of the MTP joint without signs of stiffness involving the joint.  He remains nontender over the sesamoids.  I do not palpate any swelling in the interspace between the second and first toes and he is nontender along the shaft of the second metatarsal.  He will flex and extend his toes through full range of motion with normal strength.  Normal temperature to the touch and the skin.  Appropriate conversation and affect.      I reviewed with the patient updated x-rays of the foot and I do not see any bony abnormality involving the first or second metatarsal.    Assessment persistent left-sided dorsal foot and left-sided great toe pain status post crush injury 6 months ago    Plan: I discussed today, with his C present that despite immobilization, physical therapy, nonsteroidal anti-inflammatories, ice, modalities from physical therapy, his pain continues.  His advanced imaging  studies with MRI and his x-ray do not show a fracture or derangement or disruption that would require surgery.  I feel I do not have other conservative cares to offer him in sports medicine.  It is possible, based on his recent episode of swelling and redness that he may have causalgia in the setting of a peripheral nerve injury to the dorsum of his foot.  An EMG is pending.  I will have him referred to occupational medicine, and also referred to pain medicine clinic for consultation to see if it would be reasonable to consider nerve block in the setting of causalgia.  In the meantime I agreed to supply him with his current work restrictions for an additional month, until he can have his care transferred to occupational medicine.                    Again, thank you for allowing me to participate in the care of your patient.      Sincerely,    Gallito Bonilla MD

## 2022-11-10 NOTE — PROGRESS NOTES
Victor Manuel Saavedra is here today with a [facility], Kaity Wilson [phone]. Pavan, certified athletic trainer is also present for today's visit.    Patient continues to be consistent in his area of discomfort which is over the dorsum of the shaft of the first toe metatarsal, to the dorsum of the first MTP.  However since the last visit he indicates that there was an episode of swelling over the dorsum of the foot associated with redness.  The redness and swelling have now resolved, but he does have pictures of the redness on his phone.  It seems to be a circumscribed area of redness about the size of quarter that was localized between the area of the second and third interspace near the distal metatarsals.  Skin overlying that area there is normal today.  I do not see swelling today in this area.    The pain continues to keep him from weightbearing without the use of the boot.  He does remove the boot for physical therapy and for his range of motion exercises for the toe.  He has been consistent in this.  Physical therapy did try phonophoresis.  The specific area where physical therapy did phonophoresis was over the dorsum of the MTP.  So the cortisone was not placed in the area where the redness appeared between the interspace of the 2 toes.    He indicates that he has been consistent with a 4-hour shift at work in a seated job and is able to tolerate it.  But he describes persistent pain in the area of the great toe that keeps him from ambulating without the boot, and ambulation remains painful while in the boot. He tried a short time in a regular shoe with a half steel toed insert and it was too uncomfortable.  He points to the metatarsal shaft of the first toe as the focal area that was uncomfortable with bearing weight He has been. He works on range of motion exercises for the great toe at home.  He denies numbness and tingling in the foot.      H/O acute left foot injury 5/14/2022:  Patient climbs cell phone towers  for work. 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  As he did so, his left great toe at the MTP was forced into plantarflexion, as the dorsum of the MTP happened to be levered against a cross beam concomitantly.   Has had pain over the dorsum of his large toe since.              3 views left foot radiographs 9/29/2022 2:14 PM     History: AP, lateral and oblique; Left foot pain     Comparison: 5/24/2022, MR 8/2/2022     Findings:     Standing AP, oblique, and lateral  views of the left foot were  obtained.      No acute osseous abnormality.       Lisfranc articulation alignment is congruent on these non-weight  bearing images.     No substantial degenerative change. Presumed bone island of the first  metatarsal head.     Soft tissue is unremarkable.                                                                      Impression:  1. No acute osseous abnormality.  2. No substantial degenerative change.     EDUAR MARTIN         EXAM: MR FOOT LEFT W/O CONTRAST  LOCATION: [facility]  DATE/TIME: 8/2/2022 11:17 AM     INDICATION: Left great toe injury, persistent pain despite 2 months of conservative management (post op shoe, NSAIDS, ice, PT, relative rest). Concern for bone stress injury.  COMPARISON: None.  TECHNIQUE: Unenhanced.     FINDINGS:      JOINTS AND BONES:   -There is probably mild marrow edema versus less likely signal artifact involving the distal phalanges of the first through fourth toes, most marked along the distal whit (series 4 images 10, 15, 19, 22). No other bony abnormality. No joint abnormality.   No joint effusion or synovitis.     TENDONS:   -No tendon tear, tendinopathy, or tenosynovitis.      LIGAMENTS:   -Lisfranc ligament: Intact. No subluxation.     MUSCLES AND SOFT TISSUES:   -No muscle atrophy or  edema. No soft tissue mass or fluid collection.                                                                      IMPRESSION:  1.  Mild marrow edema involving the distal phalanges of the first through fourth toes. This is nonspecific, acute traumatic or repetitive contusions most likely.  2.  No additional abnormality.        PMH:  Procedure /Surgery  Excision of capillary hemangioma left ankle  Date of Procedure/Surgery- 4/12/17 / [facility]    Active problem list:  Patient Active Problem List   Diagnosis     Dysthymic Disorder     Traumatic Urethral Stricture     Fatigue     Tachycardia     Unspecified epilepsy without mention of intractable epilepsy     Fainting     Headache     Hematuria, unspecified     Urethral stricture unspecified       FH:  No family history on file.    SH:  Social History     Socioeconomic History     Marital status: Single     Spouse name: Not on file     Number of children: Not on file     Years of education: Not on file     Highest education level: Not on file   Occupational History     Not on file   Tobacco Use     Smoking status: Some Days     Smokeless tobacco: Never   Substance and Sexual Activity     Alcohol use: Yes     Drug use: Never     Sexual activity: Not on file   Other Topics Concern     Not on file   Social History Narrative     Not on file     Social Determinants of Health     Financial Resource Strain: Not on file   Food Insecurity: Not on file   Transportation Needs: Not on file   Physical Activity: Not on file   Stress: Not on file   Social Connections: Not on file   Intimate Partner Violence: Not on file   Housing Stability: Not on file       MEDS:  See EMR, reviewed  ALL:  See EMR, reviewed    REVIEW OF SYSTEMS:  CONSTITUTIONAL:NEGATIVE for fever, chills, change in weight  INTEGUMENTARY/SKIN: NEGATIVE for worrisome rashes, moles or lesions  EYES: NEGATIVE for vision changes or irritation  ENT/MOUTH: NEGATIVE for ear, mouth and throat problems  RESP:NEGATIVE  for significant cough or SOB  BREAST: NEGATIVE for masses, tenderness or discharge  CV: NEGATIVE for chest pain, palpitations or peripheral edema  GI: NEGATIVE for nausea, abdominal pain, heartburn, or change in bowel habits  :NEGATIVE for frequency, dysuria, or hematuria  :NEGATIVE for frequency, dysuria, or hematuria  NEURO: NEGATIVE for weakness, dizziness or paresthesias  ENDOCRINE: NEGATIVE for temperature intolerance, skin/hair changes  HEME/ALLERGY/IMMUNE: NEGATIVE for bleeding problems  PSYCHIATRIC: NEGATIVE for changes in mood or affect        Objective: Today I do not see any skin discoloration or swelling over the dorsum of the foot.  He remains tender broadly along the course of the metatarsal of the first digit but he would allow full dorsiflexion and full volar flexion of the MTP joint without signs of stiffness involving the joint.  He remains nontender over the sesamoids.  I do not palpate any swelling in the interspace between the second and first toes and he is nontender along the shaft of the second metatarsal.  He will flex and extend his toes through full range of motion with normal strength.  Normal temperature to the touch and the skin.  Appropriate conversation and affect.      I reviewed with the patient updated x-rays of the foot and I do not see any bony abnormality involving the first or second metatarsal.    Assessment persistent left-sided dorsal foot and left-sided great toe pain status post crush injury 6 months ago    Plan: I discussed today, with his C present that despite immobilization, physical therapy, nonsteroidal anti-inflammatories, ice, modalities from physical therapy, his pain continues.  His advanced imaging studies with MRI and his x-ray do not show a fracture or derangement or disruption that would require surgery.  I feel I do not have other conservative cares to offer him in sports medicine.  It is possible, based on his recent episode of swelling and redness  that he may have causalgia in the setting of a peripheral nerve injury to the dorsum of his foot.  An EMG is pending.  I will have him referred to occupational medicine, and also referred to pain medicine clinic for consultation to see if it would be reasonable to consider nerve block in the setting of causalgia.  In the meantime I agreed to supply him with his current work restrictions for an additional month, until he can have his care transferred to occupational medicine.

## 2022-11-15 ENCOUNTER — TELEPHONE (OUTPATIENT)
Dept: ORTHOPEDICS | Facility: CLINIC | Age: 32
End: 2022-11-15

## 2022-11-15 NOTE — TELEPHONE ENCOUNTER
BG LVM for Mr. Dowell at [employer] informing him that Dr. Bonilla' clinic has not received any paperwork from their office. Confirmed fax number is [phone].    Informed once fax is received Dr. Bonilla will review and will respond at that time.    BG Loza

## 2022-11-15 NOTE — TELEPHONE ENCOUNTER
[facility] Call Center    Phone Message    May a detailed message be left on voicemail: yes     Reason for Call: Other: [employer] calling faxed over a narrative report to the doctor wants to know if it was received, and if the doctor is going to be able to provide the report or not.  Please call Kaia at  [phone]         Action Taken: Other:  sports med    Travel Screening: Not Applicable

## 2022-11-17 ENCOUNTER — VIRTUAL VISIT (OUTPATIENT)
Dept: BEHAVIORAL HEALTH | Facility: CLINIC | Age: 32
End: 2022-11-17
Payer: COMMERCIAL

## 2022-11-17 DIAGNOSIS — F32.A DEPRESSION, UNSPECIFIED DEPRESSION TYPE: ICD-10-CM

## 2022-11-17 DIAGNOSIS — G47.00 INSOMNIA, UNSPECIFIED TYPE: ICD-10-CM

## 2022-11-17 DIAGNOSIS — F41.9 ANXIETY DISORDER, UNSPECIFIED TYPE: Primary | ICD-10-CM

## 2022-11-17 DIAGNOSIS — F43.10 POSTTRAUMATIC STRESS DISORDER: ICD-10-CM

## 2022-11-17 PROCEDURE — 99215 OFFICE O/P EST HI 40 MIN: CPT | Mod: GT | Performed by: STUDENT IN AN ORGANIZED HEALTH CARE EDUCATION/TRAINING PROGRAM

## 2022-11-17 ASSESSMENT — ANXIETY QUESTIONNAIRES
7. FEELING AFRAID AS IF SOMETHING AWFUL MIGHT HAPPEN: NEARLY EVERY DAY
5. BEING SO RESTLESS THAT IT IS HARD TO SIT STILL: NEARLY EVERY DAY
7. FEELING AFRAID AS IF SOMETHING AWFUL MIGHT HAPPEN: NEARLY EVERY DAY
4. TROUBLE RELAXING: NEARLY EVERY DAY
3. WORRYING TOO MUCH ABOUT DIFFERENT THINGS: NEARLY EVERY DAY
6. BECOMING EASILY ANNOYED OR IRRITABLE: NEARLY EVERY DAY
1. FEELING NERVOUS, ANXIOUS, OR ON EDGE: NEARLY EVERY DAY
8. IF YOU CHECKED OFF ANY PROBLEMS, HOW DIFFICULT HAVE THESE MADE IT FOR YOU TO DO YOUR WORK, TAKE CARE OF THINGS AT HOME, OR GET ALONG WITH OTHER PEOPLE?: EXTREMELY DIFFICULT
GAD7 TOTAL SCORE: 21
GAD7 TOTAL SCORE: 21
IF YOU CHECKED OFF ANY PROBLEMS ON THIS QUESTIONNAIRE, HOW DIFFICULT HAVE THESE PROBLEMS MADE IT FOR YOU TO DO YOUR WORK, TAKE CARE OF THINGS AT HOME, OR GET ALONG WITH OTHER PEOPLE: EXTREMELY DIFFICULT
2. NOT BEING ABLE TO STOP OR CONTROL WORRYING: NEARLY EVERY DAY
GAD7 TOTAL SCORE: 21

## 2022-11-17 ASSESSMENT — PATIENT HEALTH QUESTIONNAIRE - PHQ9
SUM OF ALL RESPONSES TO PHQ QUESTIONS 1-9: 25
10. IF YOU CHECKED OFF ANY PROBLEMS, HOW DIFFICULT HAVE THESE PROBLEMS MADE IT FOR YOU TO DO YOUR WORK, TAKE CARE OF THINGS AT HOME, OR GET ALONG WITH OTHER PEOPLE: EXTREMELY DIFFICULT
SUM OF ALL RESPONSES TO PHQ QUESTIONS 1-9: 25

## 2022-11-17 NOTE — PROGRESS NOTES
Victor Manuel saw a nutrition specialist and they told him to eat peanut butter and fish, which he is a little bit past at this point.     There were 3-4 different mental health people. Some had schedule conflicts, with a lot of different names.     Hasn't been taking the meds we had discussed.     Stopped taking Lexapro, didn't like the peer reviewed information he found about this online.     Still takes the vitamins occasionally. Primarily drinks Boost shakes for nutrition. And Muscle Milk.     Thinking of wanting to start with treating ADHD, not depression, given what he read about Lexapro. Feels like his other symptoms all line up directly with ADHD, and     Had ADHD testing in Freshman year of high school.     Denies any safety concerns or suicidal thoughts. He would use crisis numbers if needed.     Given the severity of Victor Manuel's depression, it is unlikely that ADHD is the sole cause and contributor to this. ADHD is not known to directly cause severe and prolonged depressive symptoms with suicidal ideation without incredibly specific context. He does note that he had ADHD testing in freshman year of high school, but is not able to obtain these records. It would be reasonable for him to pursue ADHD evaluation moving forward to help further discern the nature of his symptoms in the context of his past. It is unlikely that severe, persistent depression symptoms will improve sufficiently without gold standard treatment, which involves a combination of medications and psychotherapy. Also, his failure to complete a full trial of Lexapro does not indicate any treatment resistance. Medications have to be taken at a full therapeutic dose for at least 6 weeks to test efficacy, and dose change is sometimes necessary.     PCP may place the order for ADHD testing using the Adult Mental Health Community Health Referral order.     Went over medication management resources from [place] [employer] website and provided the top 5 options in the  KAYLA Rush acknowledged that this information was provided, that he could find it, and agreed to call these places to check for medication management resources.   He also acknowledged our statement that we do not have social work resources and are not able to schedule for him or guarantee placement at any resources found through search.       48 min spent on the date of the encounter in chart review, patient visit, review of tests, documentation, care coordination, and/or discussion with other providers about the issues documented above. Any psychotherapy time is excluded from this total. {  **Time documented must exclude any time separately billed (e.g. add on therapy time)**:[id number]}

## 2022-11-17 NOTE — PROGRESS NOTES
Victor Manuel Saavedra is a 32 year old who is being evaluated via a billable video visit.      Pt will join video visit via: [employer]  If there are problems joining the visit, send backup video invite via: Text to preferred phone: [phone]    Reason for telehealth visit: Patient has requested telehealth visit    Originating location (patient location): Patient's home    Will anyone else be joining the visit? No

## 2022-11-17 NOTE — PATIENT INSTRUCTIONS
Please check out the following options for long term medication management.     [facility], RYAN Bunn Chris   [phone]  [email or web address]    [facility]  [phone]    [facility]  [phone]    [employer]  [phone]    [facility]  [phone]  [email or web address]      [place] Trauma Project:   [email or web address].org/[place]-[email or web address]-[email or web address]-directory      **For crisis resources, please see the information at the end of this document**  Patient Education    Thank you for coming to the [facility] MENT[facility] HE[facility] & [facility]DI[facility].     Lab Testing:  If you had lab testing today and your results are reassuring or normal they will be mailed to you or sent through [employer] within 7 days. If the lab tests need quick action we will call you with the results. The phone number we will call with results is # [phone]. If this is not the best number please call our clinic and change the number.     Medication Refills:  If you need any refills please call your pharmacy and they will contact us. Our fax number for refills is [phone].   Three business days of notice are needed for general medication refill requests.   Five business days of notice are needed for controlled substance refill requests.   If you need to change to a different pharmacy, please contact the new pharmacy directly. The new pharmacy will help you get your medications transferred.     Contact Us:  Please call [phone] during business hours (8-5:00 M-F).   If you have medication related questions after clinic hours, or on the weekend, please call [phone].     Financial Assistance [phone]   Medical Records [phone]       MENTAL HEALTH CRISIS RESOURCES:  For a emergency help, please call 911 or go to the nearest Emergency Department.     Emergency Walk-In Options:    [facility] ([place]): [phone] - Specialized mental health emergency area designed to be calming  [facility] ([place]): [phone]  [facility] [place] [facility] ([place]): [phone]  [facility] ([place]): [phone]    County Crisis Information:   [place]: [phone]  [place]: [phone]  [place] ([place]) - Adult: [phone]     Child: [phone]  [place] - Adult: [phone]     Child: [phone]  [place]: [phone]  List of all [facility] resources:   https://[place].[facility]/dhs/people-[email or web address]-serve/adults/health-care/mental-health/resources/crisis-contacts.jsp    National Crisis Information:   Crisis Text Line: Text  [place]  to 821938  Suicide & Crisis Lifeline: 988  National Suicide Prevention Lifeline: 2-262-583-BJWF (1-416.879.9422)       For online chat options, visit https://suicidepreventionlifeline.org/chat/  Poison Control Center: 1-735.810.8022  Trans Lifeline: 1-617.557.4431 - Hotline for transgender people of all ages  The [place] Project: 8-328-316-8089 - Hotline for LGBT youth     For Non-Emergency Support:   Fast Tracker: Mental Health & Substance Use Disorder Resources -   https://www.[employer].org/

## 2022-11-18 ENCOUNTER — TELEPHONE (OUTPATIENT)
Dept: PHYSICAL THERAPY | Facility: REHABILITATION | Age: 32
End: 2022-11-18

## 2022-11-18 NOTE — PROGRESS NOTES
Telehealth Details  Type of service:  video visit for consult  Date of service: Nov 17, 2022  Time of service:  Start Time:  1:30 PM  End Time:  2:30 PM    Reason for video visit:  Services only offered telehealth  Originating Site (patient location):  Patient's home  Distant Site (provider location):  [facility]  Mode of Communication:  Video conference via [employer]       [facility]  MEDICATION MANAGEMENT CONSULTATION #2       Victor Manuel Saavedra is a 32 year old referred by Gallito Bonilla for evaluation of Depression and anxiety. Initial consultation on 10/13/22.     CARE TEAM:   PCP- Physician No Ref-Primary  Ortho- Gallito Bonilla  Therapist- None     Assessment & Plan    History and interview support the following diagnoses:     Anxiety disorder, unspecified  Depressive disorder, unspecified  PTSD  Insomnia, unspecified  Hx of ADHD       Victor Manuel reports significant anxiety and depressive symptoms that have been more prominent after a foot injury at work in May this year. It affected his mobility and ability to carry out every day activities as a result of painful distress. He describes anticipatory anxiety associated with certain tasks which leads to avoidance. Pertinent symptoms include loss of appetite with considerable weight loss, insomnia and difficulty in his overall functioning.  He feel his depressive symptoms are being driven by his anxiety.   He has not been satisfied with the support from his work place which has added on additional stress of legal proceedings against them and financial burden. There appears to be some unresolved trauma which has resurfaced at this time, which may explain the severity of the emotional reaction, and how it is so hard for him to cope with the stress of this situation.     Today he has informed us that he did not start the antidepressants prescribed at the last visit and would like to be treated  for ADD as he feels his symptoms are mostly from this. Given the severity of Victor Manuel's depression, it is unlikely that ADHD is the sole cause and contributor to this. ADHD is not known to directly cause severe and prolonged depressive symptoms with suicidal ideation without incredibly specific context. He does note that he had ADHD testing in freshman year of high school, but is not able to obtain these records. It would be reasonable for him to pursue ADHD evaluation moving forward to help further discern the nature of his symptoms in the context of his past. It is unlikely that severe, persistent depression symptoms will improve sufficiently without gold standard treatment, which involves a combination of medications and psychotherapy. Also, his failure to complete a full trial of Lexapro does not indicate any treatment resistance. Medications have to be taken at a full therapeutic dose for at least 6 weeks to test efficacy, and dose change is sometimes necessary.     Psychotherapy discussion: Primary recommendation for the treatment of anxiety. Trauma focused therapy would be helpful in addressing his unresolved trauma which has been triggered with his accident and seems to be  a perpetuating factor for his symptoms  Supportive therapy can be useful for reducing the impact of acute or chronic psychosocial stressors.   CBT can be particularly helpful for ruminative thinking and addressing maladaptive coping mechanisms that may worsen anxiety.   Medication discussion:   Primary mood support medications:   He will benefit from from serotonergic medications for anxiety, mood symptoms, and PTSD. Mirtazapine will be a good choice given his anxiety, poor appetite with weight loss, and insomnia. We have advised that he takes this scheduled every night  Other considerations include SSRI's which are first line treatment for anxiety, Given that his experience with escitalopram was not satisfactory, sertraline would be an ideal  "next option to try. Of note, it does not sound like escitalopram was clearly the cause of the tiredness issues he was experiencing, so trying it again at a more therapeutic dose and duration would also be an appropriate option.   Short acting anxiolytic / sleep medications:   Short acting anxiolytic meds are not effective at truly treating anxiety, but can help with getting out the door if panic symptoms / agoraphobia become more prominent.   Hydroxyzine 25- 50 mg as needed would be appropriate to continue he has found this somewhat helpful. Gabapentin is another simple option to use that will not reinforce anxiety too readily.   Quetiapine or olanzapine would be reasonable alternatives that may help with offsetting recent weight loss as well.   Antiadrenergic medications:   He reports \"anticipatory anxiety\", which is accompanied by autonomic hyperarousal, \"fight or flight\". May benefit from propranolol 20 - 40 mg prior to embarking on anxiety provoking task.   Other considerations include Guanfacine, clonidine, or prazosin.     [place] PRESCRIPTION MONITORING PROGRAM [] was checked today: not using controlled substances    PSYCHOTROPIC DRUG INTERACTIONS:  Mirtazapine and Hydroxyzine- CNS  Depression  MANAGEMENT:  Monitoring for adverse effects     Plan     1) PSYCHOTROPIC MEDICATION RECOMMENDATIONS:  Victor Manuel declined any of the above medication recommendations at this time.     2) THERAPY: Psychotherapy is a primary recommendation.   Currently not in therapy. Would likely benefit from trauma focused therapy  [place] Trauma Project: https://www.mntraumaproject.org/[place]-trauma-therapist-directory    3) NEXT DUE:   Labs- Routine monitoring is not indicated for current psychotropic medication regimen   ECG- Routine monitoring is not indicated for current psychotropic medication regimen   Rating Scales- N/A    4) REFERRALS / COORDINATION: PCP may place the order for ADHD evaluation using the Adult Mental Health  Referral " order.     5) DISPOSITION:   Went over medication management resources from [place] Fast Tracker website and provided the top 5 options in the AVS. Victor Manuel acknowledged that this information was provided, that he could find it in the AVS, and agreed to call these places to check for medication management resources.   He also acknowledged that we are only a consult service, and do not have social work resources, so are not able to schedule for him or guarantee placement at any of the resources provided.       Treatment Risk Statement:  The patient understands the risks, benefits, adverse effects and alternatives. Agrees to treatment with the capacity to do so. No medical contraindications to treatment. Agrees to contact provider for any problems. The patient understands to call 911 or go to the nearest ED if urgent or life threatening symptoms occur. Crisis contact numbers are provided routinely in the After Visit Summary.       PROVIDER:  Donna Alvarez MD, PGY4  ATTENDING: Fermin Walter MD       Pertinent Background   Victor Manuel notes history of ADHD, Anxiety, Depression and PTSD in High School. This resulted in 1 suicide attempt at age 15. Symptoms first started due to bullying and multiple trauma.He also reports severe anxiety after a traumatic break up in 2018.     Initial diagnosis and treatment include Ritalin for ADHD and brief psychotherapy after the murder of his best friend and family in 2008. He did not get any treatment after the break up.  Psych pertinent item history includes suicide attempt  and trauma hx     Interval History   Victor Manuel describes having frustration with getting a provider since our last visit.    He saw a nutrition specialist and they told him to eat peanut butter and fish, which he is a little bit past at this point.      There were 3-4 different mental health people. Some had schedule conflicts, with a lot of different names.      Hasn't been taking the meds we had discussed.      Stopped  taking Lexapro, didn't like the peer reviewed information he found about this online.      Still takes the vitamins occasionally. Primarily drinks Boost shakes for nutrition. And Muscle Milk.      Thinking of wanting to start with treating ADHD, not depression, given what he read about Lexapro. Feels like his other symptoms all line up directly with ADHD, and      Had ADHD testing in Freshman year of high school.      Denies any safety concerns or suicidal thoughts. He would use crisis numbers if needed.     Pertinent Social Hx:  FINANCIAL SUPPORT- working  LIVING SITUATION / RELATIONSHIPS- Lives with roommates  SOCIAL/ SPIRITUAL SUPPORT- Yes, somewhat from siblings    Pertinent Substance Use  Alcohol- 1-2 beers  Nicotine- Nicotine pouches about 5-8 day  Caffeine- lots, 2 energy drinks and a coffee in a day  Opioids- None  Narcan Kit- N/A  THC/CBD- No current use  Other Illicit Drugs- none     Medical Review of Systems    Dizziness/orthostasis- Yes, because of reduced nutrition  Headaches-   GI- Mostly liquid diet, Grade 3 Hemorrhoids  Sexual health- HPV   Past Medical History      Neurologic Hx [head injury, seizures, etc.]: 2 concussions in HS, 1 seizure around age 8  Patient Active Problem List   Diagnosis    Dysthymic Disorder    Traumatic Urethral Stricture    Fatigue    Tachycardia    Unspecified epilepsy without mention of intractable epilepsy    Fainting    Headache    Hematuria, unspecified    Urethral stricture unspecified      Medications      Current Outpatient Medications   Medication Sig Dispense Refill    cholecalciferol, vitamin D3, (CHOLECALCIFEROL) 400 unit Tab Take 400 Units by mouth daily      diclofenac (VOLTAREN) 1 % topical gel Apply 2 g topically 4 times daily 150 g 1    hydrOXYzine (ATARAX) 25 MG tablet Take 1-2 tablets (25-50 mg) by mouth every 6 hours as needed for anxiety 30 tablet 0    multivitamin (MULTIVITAMIN) per tablet Take 1 tablet by mouth daily      dexamethasone (DECADRON) 4  MG/ML injection Use 4 mg or dose determined by provider for iontophoresis. (Patient not taking: Reported on 9/16/2022) 30 mL 0    diclofenac (VOLTAREN) 75 MG EC tablet Take 1 tablet (75 mg) by mouth 2 times daily Always take with food (Patient not taking: Reported on 9/16/2022) 60 tablet 0        Physical Exam  (Vitals Only)   There were no vitals taken for this visit.    Pulse Readings from Last 5 Encounters:   08/08/22 78   07/22/22 89   05/24/22 54     Wt Readings from Last 5 Encounters:   10/27/22 70.8 kg (156 lb)   09/29/22 70.8 kg (156 lb)   09/01/22 70.8 kg (156 lb)   08/08/22 70.8 kg (156 lb)   07/22/22 68.5 kg (151 lb)     BP Readings from Last 5 Encounters:   08/08/22 124/66   07/22/22 122/84   07/21/22 118/82   05/24/22 113/73      Mental Status Exam   Alertness: alert  and oriented  Appearance: adequately groomed  Behavior/Demeanor: cooperative and calm, with good eye contact   Speech: normal and regular rate and rhythm  Language: intact and no problems  Psychomotor: normal or unremarkable  Mood: depressed  Affect: Passive, not generally congruent to mood  Thought Process/Associations: unremarkable  Thought Content:  Reports none;  Denies suicidal ideation, violent ideation and delusions  Perception:  Reports none;  Denies auditory hallucinations and visual hallucinations  Insight: adequate and intact  Judgment: intact  Cognition: does  appear grossly intact; formal cognitive testing was done  Gait and Station: unremarkable     Data      PROMIS-10 Total Score w/o Sub Scores 10/13/2022 11/17/2022   PROMIS TOTAL - SUBSCORES 11 10     CAGE-AID Total Score 6/8/2022   Total Score 4   Total Score MyChart 4 (A total score of 2 or greater is considered clinically significant)     PHQ-9 SCORE 9/16/2022 10/13/2022 11/17/2022   PHQ-9 Total Score MyChart 22 (Severe depression) 23 (Severe depression) 25 (Severe depression)   PHQ-9 Total Score 22 23 25     STEPHANI-7 SCORE 9/16/2022 10/13/2022 11/17/2022   Total Score - 21  (severe anxiety) 21 (severe anxiety)   Total Score 18 21 21

## 2022-11-18 NOTE — TELEPHONE ENCOUNTER
PT called patient to check in as patient did not show for visit.  PT reminded patient of next appointment and reviewed cancellation/no show policy regarding discharge after 2 no shows with need for new referral for return.  Currently this is counted as patients 1st.

## 2022-11-30 ENCOUNTER — MYC MEDICAL ADVICE (OUTPATIENT)
Dept: INTERNAL MEDICINE | Facility: CLINIC | Age: 32
End: 2022-11-30

## 2022-11-30 ENCOUNTER — OFFICE VISIT (OUTPATIENT)
Dept: INTERNAL MEDICINE | Facility: CLINIC | Age: 32
End: 2022-11-30
Attending: PSYCHIATRY & NEUROLOGY
Payer: COMMERCIAL

## 2022-11-30 VITALS
HEART RATE: 77 BPM | SYSTOLIC BLOOD PRESSURE: 112 MMHG | DIASTOLIC BLOOD PRESSURE: 75 MMHG | OXYGEN SATURATION: 98 % | TEMPERATURE: 97.6 F

## 2022-11-30 DIAGNOSIS — F43.10 POSTTRAUMATIC STRESS DISORDER: ICD-10-CM

## 2022-11-30 DIAGNOSIS — F17.290 OTHER TOBACCO PRODUCT NICOTINE DEPENDENCE, UNCOMPLICATED: ICD-10-CM

## 2022-11-30 DIAGNOSIS — F41.9 ANXIETY DISORDER, UNSPECIFIED TYPE: ICD-10-CM

## 2022-11-30 DIAGNOSIS — F34.1 DYSTHYMIC DISORDER: Primary | ICD-10-CM

## 2022-11-30 DIAGNOSIS — Z87.898 HISTORY OF SEIZURE: ICD-10-CM

## 2022-11-30 DIAGNOSIS — F32.A DEPRESSION, UNSPECIFIED DEPRESSION TYPE: ICD-10-CM

## 2022-11-30 PROBLEM — F17.200 NICOTINE DEPENDENCE: Status: ACTIVE | Noted: 2022-11-30

## 2022-11-30 PROCEDURE — 99214 OFFICE O/P EST MOD 30 MIN: CPT | Performed by: INTERNAL MEDICINE

## 2022-11-30 NOTE — PROGRESS NOTES
ASSESSMENT AND PLAN:    1. Anxiety disorder  He denies overt panic disorder.  Reports he did not like most medications, including escitalopram, that he has tried in the past.  Denies also OCD features.    - Primary Care Referral    2. Depression  Has element of sleep disturbance, no self harm ideation.  Depression is not strongly suggested.    - Primary Care Referral    3. History of seizure  He reports this in childhood.  No history of taking anti-epilpetics.     4. Other tobacco product nicotine dependence, uncomplicated  Using the nicotine oral lozenge products.     5. Dysthymic disorder   Can't exclude a personality disorder with somatization.  No apparent psychosis.  Injuries both at his current work, and at the [employer] years ago may play a role in passive dependent personality.  I can't arrive at a medication regimen to try and will refer for psychology evaluation for diagnosis prior to treatment.    6. Weight loss/anorexia  Seems to use red bull -3-4 per day, with his nicotine, and using boost.      7. 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    CHIEF COMPLAINT:  Establish care.     HISTORY OF PRESENT ILLNESS:  Victor Manuel Saavedra is a 32 year old male here apparently in referral for primary care to address his anxiety and depression, possible PTSD.  He reports anorexia, and mental 'fogginess' and feels he may have ADHD.  He uses red bulls, 3-4 daily and nicotine  Mouth lozenges.  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  Injured his R foot a number of months ago, no bone injury, doesn't seem to be improving.      REVIEW OF SYSTEMS:   See HPI, all other systems on review are negative.    Past Medical History:   Diagnosis Date     Nicotine dependence 11/30/2022     History   Smoking Status     Some Days   Smokeless Tobacco     Never     No family history on file.  No past surgical history on file.  VITALS:  Vitals:    11/30/22 1425   BP: 112/75   BP Location: Left arm   Patient Position: Sitting    Cuff Size: Adult Large   Pulse: 77   Temp: 97.6  F (36.4  C)   TempSrc: Tympanic   SpO2: 98%     Wt Readings from Last 3 Encounters:   10/27/22 70.8 kg (156 lb)   09/29/22 70.8 kg (156 lb)   09/01/22 70.8 kg (156 lb)     PHYSICAL EXAM:  Constitutional:  In NAD, alert and oriented  Neck: no cervical or axillary adenopathy  Cardiac:  S1 S2   Lungs: Clear to auscultation  Abdomen:   Soft, flat and non-tender  Neurologic:  Speech clear, no focal arm or leg weakness, gait normal     Psychiatric:  Thinking is clear, somewhat evasive with circumlocution about his needs and problems.      DECISION TO OBTAIN OLD RECORDS AND/OR OBTAIN HISTORY FROM SOMEONE OTHER THAN PATIENT, AND/OR ACCESSING CARE EVERYWHERE):  1  0     REVIEW AND SUMMARIZATION OF OLD RECORDS, AND/OR OBTAINING HISTORY FROM SOMEONE OTHER THAN PATIENT, AND/OR DISCUSSION OF CASE WITH ANOTHER HEALTH CARE PROVIDER:  2 reviewed past health evaluations.     REVIEW AND/OR ORDER OF OF CLINICAL LAB TESTS: 1  0.    REVIEW AND/OR ORDER OF RADIOLOGY TESTS: 1 0.    REVIEW AND/OR ORDER OF MEDICAL TESTS (EKG/ECHO/COLONOSCOPY/EGD): 1  0    INDEPENDENT  VISUALIZATION OF IMAGE, TRACING, OR SPECIMEN ITSELF (2 EACH):  0     TOTAL: 2    Current Outpatient Medications   Medication Sig Dispense Refill     cholecalciferol, vitamin D3, (CHOLECALCIFEROL) 400 unit Tab Take 400 Units by mouth daily       diclofenac (VOLTAREN) 1 % topical gel Apply 2 g topically 4 times daily 150 g 1     hydrOXYzine (ATARAX) 25 MG tablet Take 1-2 tablets (25-50 mg) by mouth every 6 hours as needed for anxiety 30 tablet 0     multivitamin (MULTIVITAMIN) per tablet Take 1 tablet by mouth daily       Jamel Barrios MD  Internal Medicine  [facility]

## 2022-12-01 ENCOUNTER — MYC MEDICAL ADVICE (OUTPATIENT)
Dept: INTERNAL MEDICINE | Facility: CLINIC | Age: 32
End: 2022-12-01

## 2022-12-01 ENCOUNTER — HOSPITAL ENCOUNTER (OUTPATIENT)
Dept: PHYSICAL THERAPY | Facility: REHABILITATION | Age: 32
Discharge: HOME | End: 2022-12-01
Payer: OTHER MISCELLANEOUS

## 2022-12-01 ENCOUNTER — TELEPHONE (OUTPATIENT)
Dept: ORTHOPEDICS | Facility: CLINIC | Age: 32
End: 2022-12-01

## 2022-12-01 DIAGNOSIS — S93.522A TURF TOE OF LEFT FOOT: Primary | ICD-10-CM

## 2022-12-01 DIAGNOSIS — R26.89 ANTALGIC GAIT: ICD-10-CM

## 2022-12-01 DIAGNOSIS — M25.675 DECREASED ROM OF LEFT FOOT: ICD-10-CM

## 2022-12-01 PROCEDURE — 97110 THERAPEUTIC EXERCISES: CPT | Mod: GP | Performed by: PHYSICAL THERAPIST

## 2022-12-01 PROCEDURE — 97033 APP MDLTY 1+IONTPHRSIS EA 15: CPT | Mod: GP | Performed by: PHYSICAL THERAPIST

## 2022-12-01 NOTE — PROGRESS NOTES
[facility]    Outpatient Physical Therapy Progress Note  Patient: Victor Manuel Saavedra  : 1990    Beginning/End Dates of Reporting Period:  06/15/2022 to 2022 (4 visits)    Referring Provider: Gallito Bonilla MD    Therapy Diagnosis: L foot pain     Client Self Report: Was off work for a couple weeks. Then returned to work 2-3 weeks. Went really poorly flared up really bad.  Off work again now from 2022 - 2022. Pain hasn't changed much at all. Doing exercises about 1 x daily due to pain.    Objective Measurements:  Objective Measure: 1st MTP left foot ROM  Details: extension 35 degrees L/ 55 on R  -  Objective Measure: ankle strength  Details: DF 3+/5, Inversion 2+/5, Eversion 3+/5, PF 2+/5 L side  Objective Measure: gait observation  Details: walking with boot - on lateral aspect of foot : unable to put pressure on 1st MTP joint         Goals:  Goal Identifier home program   Goal Description patient will be independent with home exercise program and self management of pain in 8 weeks   Target Date 22   Date Met      Progress (detail required for progress note): progressing towards slowly as patient is cautious with left foot rom     Goal Identifier walking   Goal Description patient will be able to return to walking for household chores on feet x 20 minutes with pain less than 5/10   Target Date 22   Date Met      Progress (detail required for progress note): patient continues to wear post surgical boot with flat bottom and walking is very painful and can only tolerate short distances due to pain     Goal Identifier work   Goal Description 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     Goal Identifier     Goal Description     Target Date     Date Met      Progress (detail required for progress note):       Goal Identifier     Goal Description     Target Date     Date Met      Progress (detail required for progress note):       Goal Identifier     Goal Description     Target Date     Date Met      Progress (detail required for progress note):       Goal Identifier     Goal Description     Target Date     Date Met      Progress (detail required for progress note):       Goal Identifier     Goal Description     Target Date     Date Met      Progress (detail required for progress note):             Plan:  Changes to therapy plan of care: continue for 10 more visits    Discharge:  No

## 2022-12-01 NOTE — TELEPHONE ENCOUNTER
[facility] Call Center    Phone Message    May a detailed message be left on voicemail: yes     Reason for Call: Other: Fausto with [employer] calling had sent a narrative report request and wants to follow up on the request, has it been completed and if doctor is willing to provide report or not.  Please call Fausto at [phone] regarding this     Action Taken: Other:  sports med    Travel Screening: Not Applicable

## 2022-12-01 NOTE — ADDENDUM NOTE
Encounter addended by: Mary Martinez, PT on: 12/1/2022 1:20 PM   Actions taken: Pend clinical note, Flowsheet accepted, Clinical Note Signed

## 2022-12-01 NOTE — PROGRESS NOTES
[facility]    Outpatient Physical Therapy Progress Note  Patient: Victor Manuel Saavedra  : 1990    Beginning/End Dates of Reporting Period:  06/15/2022 to 2022 (7 visits)    Referring Provider: Gallito Bonilla MD    Therapy Diagnosis: L foot pain     Client Self Report: (P) Didn't verbalize much shrugs and indicates not much change.    Objective Measurements:  Objective Measure: 1st MTP left foot ROM  Details: extension 35 degrees L/ 55 on R  -  Objective Measure: ankle strength  Details: DF 3+/5, Inversion 2+/5, Eversion 3+/5, PF 2+/5 L side  Objective Measure: gait observation  Details: walking with boot - on lateral aspect of foot : unable to put pressure on 1st MTP joint         Goals:  Goal Identifier home program   Goal Description patient will be independent with home exercise program and self management of pain in 8 weeks   Target Date 01/15/23   Date Met      Progress (detail required for progress note): progressing towards slowly as patient is cautious with left foot rom     Goal Identifier walking   Goal Description patient will be able to return to walking for household chores on feet x 20 minutes with pain less than 5/10   Target Date 01/15/23   Date Met      Progress (detail required for progress note): patient continues to wear post surgical boot with flat bottom and walking is very painful and can only tolerate short distances due to pain     Goal Identifier work   Goal Description 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     Goal Identifier     Goal Description     Target Date     Date Met      Progress (detail required for progress note):       Goal Identifier      Goal Description     Target Date     Date Met      Progress (detail required for progress note):       Goal Identifier     Goal Description     Target Date     Date Met      Progress (detail required for progress note):       Goal Identifier     Goal Description     Target Date     Date Met      Progress (detail required for progress note):       Goal Identifier     Goal Description     Target Date     Date Met      Progress (detail required for progress note):             Plan:  Changes to therapy plan of care: additional visits recommended up to 14    Discharge:  No   12/01/22 1030   Signing Clinician's Name / Credentials   Signing clinician's name / credentials Mary Martinez DPT, PT   Session Number   Session Number 7/12   Progress Note/Recertification   Progress Note Due Date 01/15/23   Progress Note Completed Date 12/01/22   Adult Goals   PT Ortho Eval Goals 1;2;3   Ortho Goal 1   Goal Identifier home program   Goal Description patient will be independent with home exercise program and self management of pain in 8 weeks   Goal Progress progressing towards slowly as patient is cautious with left foot rom   Target Date 01/15/23   Ortho Goal 2   Goal Identifier walking   Goal Description patient will be able to return to walking for household chores on feet x 20 minutes with pain less than 5/10   Goal Progress patient continues to wear post surgical boot with flat bottom and walking is very painful and can only tolerate short distances due to pain   Target Date 01/15/23   Ortho Goal 3   Goal Identifier work   Goal Description 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   Target Date 01/15/23   Subjective Report   Subjective Report Didn't verbalize much shrugs and indicates not much change.   Objective Measures   Objective Measures Objective Measure  "1;Objective Measure 2;Objective Measure 3   Objective Measure 1   Objective Measure 1st MTP left foot ROM   Details extension 35 degrees L/ 55 on R  -   Objective Measure 2   Objective Measure ankle strength   Details DF 3+/5, Inversion 2+/5, Eversion 3+/5, PF 2+/5 L side   Objective Measure 3   Objective Measure gait observation   Details walking with boot - on lateral aspect of foot : unable to put pressure on 1st MTP joint   System Outcome Measures   Outcome Measures   (LEFS = 20/80)   Iontophoresis   Iontophoresis Minutes (22544) 20   Skilled Intervention iontophoresis   Patient Response did not verbalize change in area when asked   Treatment Detail cleaned and prepped area washed with soap and water, iontophoresis pad prepped with dexamethasone and saline for use, informational sheet given, pad applied patient verbalized understanding, advised patient to remove after 6 hours with soap and water, call today or tomorrow with any questions   Progress none noted at this time   Treatment Interventions   Interventions Therapeutic Procedure/Exercise;Neuromuscular Re-education;Manual Therapy   Therapeutic Procedure/exercise   Therapeutic Procedures: strength, endurance, ROM, flexibillity minutes (50745) 15   Skilled Intervention therapeutic exercise   Patient Response tolerated exercises   Treatment Detail LTR with LE crossed x 5 min, reach & roll with UE straight x 10 each, clamshell x 10 B   Progress no increased sx reported at L foot with ther ex   Neuromuscular Re-education   Skilled Intervention neuromuscular re-ed   Patient Response verbalized regarding the handout was \"an elementary level\" booklet and educational material, declined keeping entire book however kept 2 pages from booklet   Treatment Detail Patient was handed pack labeled Why Do I Hurt? regarding pain response.  Read through it in appointment reviewed and reported no questions regarding information at this time.   Gait Training   Treatment Detail " consider gait training   Manual Therapy   Skilled Intervention manual therapy   Patient Response tolerated well - mild pain in dorsum - 1st digit   Treatment Detail STW to plantarfascia significant tenderness to palpation over medial arch, severe tenderness over 1st metatarsal, gentle stretch PROM flex/ext   Progress minimal to no redness   Assessments Completed   Assessments Completed Patient continues to have severe symptoms at dorsum and ventral aspect of foot.  Symptoms consistent with bone bruise and potential hypersensitivity. Requires skilled PT to focus on area more proximal to L foot to improve strength in those locations for improved stability proximally.  Patient will benefit from ther ex to progress as tolerated to focus on improved function at L foot/ankle.   Equipment Needs   Equipment Needs walking boot/post-surgical boot (no surgery however)   Education   Learner Patient   Readiness Acceptance   Method Demonstration   Response Demonstrates Understanding   Communication with other professionals   Communication with other professionals Communicated with MD Bonilla regarding potential use of iontophoresis with dexamethasone   Plan   Homework PTRX   Home program ankle pumps supine, ankle circles, seated ankle pumps seated LAQ, towel toe scrunch, standing left hip abd/flex/ext, gastroc/soleus stretch, 1st MTP flexion/ext, toe yoga, DF/PF stretch   Plan for next session trial treamement up the chain at low back and L hip to relieve affect of abdnomral gait pattern on L LE sx, add nustep and bike next visit,   Comments   Comments progress WBAT with boot, recommend CAM walking boot as patient having difficulty walking with current boot and pain levels still high.  Check in with MD about continuing PT   Total Session Time   Timed Code Treatment Minutes 30   Total Treatment Time (sum of timed and untimed services) 30   Medicare Claim Information   Medical Diagnosis left foot turf toe (1st MTP)   PT Diagnosis  left foot pain at 1st MTP   Sary Martinez, PT

## 2022-12-02 NOTE — TELEPHONE ENCOUNTER
I called and spoke with the patient regarding his concerns from his recent visit on 11/30/2022.  Patient was advised that it sounds like Dr. Barrios is not a good match for him as a PCP.  Patient continued to interrupt writer during the conversation.  Patient was advised to schedule an establish care appointment with Dr. Enrique Rubio.  A virtual visit with Dr. Rubio to establish care and further discuss ADHD and nutrition referrals was scheduled for 12/222/2022.    Patient was advised that Dr. Barrios will not respond to this as his concerns were addressed during the conversation today.    Radha Campa M.A., LPN  Clinic Manager  [facility]

## 2022-12-02 NOTE — TELEPHONE ENCOUNTER
Patient was contacted regarding these questions.  See other encounters for further documentation.  Referral requests sent to Dr. Rubio for review prior to patient's upcoming appointment.    Radha Campa M.A., LPN  Clinic Manager  [facility]

## 2022-12-05 ENCOUNTER — TELEPHONE (OUTPATIENT)
Dept: INTERNAL MEDICINE | Facility: CLINIC | Age: 32
End: 2022-12-05

## 2022-12-05 DIAGNOSIS — R63.0 ANOREXIA: Primary | ICD-10-CM

## 2022-12-06 ENCOUNTER — TELEPHONE (OUTPATIENT)
Dept: ORTHOPEDICS | Facility: CLINIC | Age: 32
End: 2022-12-06

## 2022-12-06 NOTE — TELEPHONE ENCOUNTER
Health Call Center    Phone Message    May a detailed message be left on voicemail: yes     Reason for Call: Other:  returning call to Lor who is on Dr. Bonilla' team. He is calling regarding the narrative report for this patient. Please call and advise.     Action Taken: Other:  SPORTS MED    Travel Screening: Not Applicable

## 2022-12-06 NOTE — TELEPHONE ENCOUNTER
Spoke with Shine and let him know that  was working on the paperwork and we would have it faxed out today. Shine was thankful for the call and appreciated the call back.

## 2022-12-07 ENCOUNTER — TELEPHONE (OUTPATIENT)
Dept: BEHAVIORAL HEALTH | Facility: CLINIC | Age: 32
End: 2022-12-07

## 2022-12-07 ENCOUNTER — VIRTUAL VISIT (OUTPATIENT)
Dept: FAMILY MEDICINE | Facility: CLINIC | Age: 32
End: 2022-12-07
Payer: COMMERCIAL

## 2022-12-07 DIAGNOSIS — J06.9 VIRAL UPPER RESPIRATORY TRACT INFECTION: Primary | ICD-10-CM

## 2022-12-07 PROCEDURE — 99213 OFFICE O/P EST LOW 20 MIN: CPT | Mod: 95 | Performed by: PHYSICIAN ASSISTANT

## 2022-12-07 ASSESSMENT — ENCOUNTER SYMPTOMS: COUGH: 1

## 2022-12-07 NOTE — TELEPHONE ENCOUNTER
[facility] Phone Encounter    Encounter Activities (Refresh/Reselect every encounter): C Only and Phone Encounter  Type of Contact Today: Phone call (patient / identified key support person reached)  Date of phone call: December 7, 2022    Reached out to Victor Manuel to offer clarification of [facility] services and answer questions. Appointment scheduled for 12/13/22 at 10 am.    Macy Mendez  Behavioral Health Clinician Intern  Supervised by Florentin Edwards Psy.D  [facility]

## 2022-12-07 NOTE — PROGRESS NOTES
"Victor Manuel is a 32 year old who is being evaluated via a billable video visit.            Assessment & Plan     Viral upper respiratory tract infection  Offered further testing with pcr or influenza but pt defers.    He is generally feeling better, is past day 5 of illness.  May return to work with well fitting mask if his covid 19 home test is positive and without restrictions if negative.     THADDEUS Sánchez [facility]    Subjective   Victor Manuel is a 32 year old presenting for the following health issues:  Cough (Pt c/o cough,ST,runny nose,fatigue,chills and loss of taste and smell x 5 days but states sx worse x 3 days.  Pt states he needs a \"note for work by noon tomorrow or he will be fired\"  Pt states he has a home covid test that he has not used yet and will do test before Provider does video visit.)  sx started 4 days ago.  No severe chest pain.    He has not done covid 19 test but agreeable to do a home test and repeat in 2 days if negative.    He is not intersted in PCR test for covid 19 or influenza testing.  HE is feeling better today and feels he can return to work tomorrow.  .        Cough    History of Present Illness       Reason for visit:  General    He eats 0-1 servings of fruits and vegetables daily.He consumes 4 sweetened beverage(s) daily.He exercises with enough effort to increase his heart rate 9 or less minutes per day.  He exercises with enough effort to increase his heart rate 3 or less days per week. He is missing 7 dose(s) of medications per week.       Review of Systems   Respiratory: Positive for cough.             Objective           Vitals:  No vitals were obtained today due to virtual visit.    Physical Exam   GENERAL: Healthy, alert and no distress  EYES: Eyes grossly normal to inspection.  No discharge or erythema, or obvious scleral/conjunctival abnormalities.  RESP: No audible wheeze, cough, or visible cyanosis.  No visible retractions or increased work " of breathing.    SKIN: Visible skin clear. No significant rash, abnormal pigmentation or lesions.  NEURO: Cranial nerves grossly intact.  Mentation and speech appropriate for age.  PSYCH: Mentation appears normal, affect normal/bright, judgement and insight intact, normal speech and appearance well-groomed.            Video-Visit Details    Video Start Time: 1225    Type of service:  Video Visit    Video End Time:1233    Originating Location (pt. Location): Home    Distant Location (provider location):  On-site    Platform used for Video Visit: Drew

## 2022-12-07 NOTE — LETTER
[address] [address]  Phone: [phone]  Fax: [phone]    December 7, 2022        Victor Manuel Saavedra  [address] [place] [address]          To whom it may concern:    RE: Victor Manuel Rush was seen 12-7-22 and reports he has been ill for the last 4 days thus unable to attend work.      He may return to work when fever free for 24 hours.      Please contact me for questions or concerns.      Sincerely,        Samantha Carter PA-C

## 2022-12-08 ENCOUNTER — TELEPHONE (OUTPATIENT)
Dept: INTERNAL MEDICINE | Facility: CLINIC | Age: 32
End: 2022-12-08

## 2022-12-08 NOTE — TELEPHONE ENCOUNTER
Nutrition Education Scheduling Outreach #1:    Call to patient to schedule. Left message with phone number to call to schedule.    Also sent Wideo message for second attempt. Requested patient to call to schedule.    Jennifer Mcguire OnCall  Diabetes and Nutrition Scheduling

## 2022-12-12 ENCOUNTER — OFFICE VISIT (OUTPATIENT)
Dept: ORTHOPEDICS | Facility: CLINIC | Age: 32
End: 2022-12-12
Payer: COMMERCIAL

## 2022-12-12 DIAGNOSIS — S97.82XS CRUSHING INJURY OF LEFT FOOT, SEQUELA: Primary | ICD-10-CM

## 2022-12-12 DIAGNOSIS — G57.72 CAUSALGIA OF LEFT LOWER EXTREMITY: ICD-10-CM

## 2022-12-12 PROCEDURE — 99213 OFFICE O/P EST LOW 20 MIN: CPT | Performed by: FAMILY MEDICINE

## 2022-12-12 NOTE — LETTER
Date:December 13, 2022      Patient was self referred, no letter generated. Do not send.        [facility] Information

## 2022-12-12 NOTE — PROGRESS NOTES
"Victor Manuel Saavedra is seen in clinic today. Pavan, certified athletic trainer is also present for today's visit, for follow-up of left-sided dorsal foot and left-sided great toe pain status post crush injury 6 months ago, and for additional work restriction.  His QRC, Kaity, is present by phone.    The pain continues to keep him from weightbearing without the use of the boot.  He does remove the boot for physical therapy and for his range of motion exercises for the toe.  He has been consistent in this.  Physical therapy did try phonophoresis.  He indicates he will tend to isolate himself in his home, and avoid activities outside the home, due to pain.  He has had no further episodes of redness involving the dorsum of the foot but he does notice a \"burning\" sensation along the dorsum of the foot, centered over the area of the shaft of the first metatarsal and MTP.     He indicates that he has been consistent with a 4-hour shift at work in a seated job and is able to tolerate it.  But he describes persistent pain in the area of the great toe that keeps him from ambulating without the boot, and ambulation remains painful while in the boot. He tried a short time in a regular shoe with a half steel toed insert and it was too uncomfortable.  He points to the metatarsal shaft of the first toe as the focal area that was uncomfortable with bearing weight He has been. He works on range of motion exercises for the great toe at home.  He denies numbness and tingling in the foot.    I referred pt to occupational medicine, and also referred to pain medicine clinic for consultation to see if it would be reasonable to consider nerve block in the setting of possible causalgia dagnosis.        H/O acute left foot injury 5/14/2022:  Patient climbs cell phone towers for work. Was climbing tower, 300 feet above the ground, stretched out with his leg extended (similar to a rock-climbing position), when he placed all of his weight over his left " forefoot to leverage himself up.  As he did so, his left great toe at the MTP was forced into plantarflexion, as the dorsum of the MTP happened to be levered against a cross beam concomitantly.   Has had pain over the dorsum of his large toe since.                    3 views left foot radiographs 9/29/2022 2:14 PM     History: AP, lateral and oblique; Left foot pain     Comparison: 5/24/2022, MR 8/2/2022     Findings:     Standing AP, oblique, and lateral  views of the left foot were  obtained.      No acute osseous abnormality.       Lisfranc articulation alignment is congruent on these non-weight  bearing images.     No substantial degenerative change. Presumed bone island of the first  metatarsal head.     Soft tissue is unremarkable.                                                                      Impression:  1. No acute osseous abnormality.  2. No substantial degenerative change.     EDUAR MARTIN            EXAM: MR FOOT LEFT W/O CONTRAST  LOCATION: [facility]  DATE/TIME: 8/2/2022 11:17 AM     INDICATION: Left great toe injury, persistent pain despite 2 months of conservative management (post op shoe, NSAIDS, ice, PT, relative rest). Concern for bone stress injury.  COMPARISON: None.  TECHNIQUE: Unenhanced.     FINDINGS:      JOINTS AND BONES:   -There is probably mild marrow edema versus less likely signal artifact involving the distal phalanges of the first through fourth toes, most marked along the distal whit (series 4 images 10, 15, 19, 22). No other bony abnormality. No joint abnormality.   No joint effusion or synovitis.     TENDONS:   -No tendon tear, tendinopathy, or tenosynovitis.      LIGAMENTS:   -Lisfranc ligament: Intact. No subluxation.     MUSCLES AND SOFT TISSUES:   -No muscle atrophy or edema. No soft tissue mass or fluid collection.                                                                      IMPRESSION:  1.  Mild marrow edema involving the distal  phalanges of the first through fourth toes. This is nonspecific, acute traumatic or repetitive contusions most likely.  2.  No additional abnormality.           PMH:  Procedure /Surgery  Excision of capillary hemangioma left ankle  Date of Procedure/Surgery- 4/12/17 / [facility]        PMH:  Past Medical History:   Diagnosis Date     Infection due to 2019 novel coronavirus 10/2022     Nicotine dependence 11/30/2022       Active problem list:  Patient Active Problem List   Diagnosis     Dysthymic Disorder     Traumatic Urethral Stricture     History of seizure     Nicotine dependence       FH:  No family history on file.    SH:  Social History     Socioeconomic History     Marital status: Single     Spouse name: Not on file     Number of children: Not on file     Years of education: Not on file     Highest education level: Not on file   Occupational History     Not on file   Tobacco Use     Smoking status: Some Days     Smokeless tobacco: Never   Substance and Sexual Activity     Alcohol use: Yes     Drug use: Never     Sexual activity: Not on file   Other Topics Concern     Not on file   Social History Narrative    Works as cell tower , working half time.  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  Brother in [place].  Sister in [place], no contact with father.  Was in the [employer] for two years.  Injured there.      Social Determinants of Health     Financial Resource Strain: Not on file   Food Insecurity: Not on file   Transportation Needs: Not on file   Physical Activity: Not on file   Stress: Not on file   Social Connections: Not on file   Intimate Partner Violence: Not on file   Housing Stability: Not on file       MEDS:  See EMR, reviewed  ALL:  See EMR, reviewed    REVIEW OF SYSTEMS:  CONSTITUTIONAL:NEGATIVE for fever, chills, change in weight  INTEGUMENTARY/SKIN: NEGATIVE for worrisome rashes, moles or lesions  EYES: NEGATIVE for vision changes or irritation  ENT/MOUTH: NEGATIVE for ear, mouth and throat  problems  RESP:NEGATIVE for significant cough or SOB  BREAST: NEGATIVE for masses, tenderness or discharge  CV: NEGATIVE for chest pain, palpitations or peripheral edema  GI: NEGATIVE for nausea, abdominal pain, heartburn, or change in bowel habits  :NEGATIVE for frequency, dysuria, or hematuria  :NEGATIVE for frequency, dysuria, or hematuria  NEURO: NEGATIVE for weakness, dizziness or paresthesias  ENDOCRINE: NEGATIVE for temperature intolerance, skin/hair changes  HEME/ALLERGY/IMMUNE: NEGATIVE for bleeding problems  PSYCHIATRIC: NEGATIVE for changes in mood or affect          Objective: He is examined without the boot.  There is no redness or skin discoloration.  It is not warm or cold to the touch.  I do not see signs of swelling in the soft tissues and there is no palpable effusion at the MTP.  He still has excellent range of motion passively to dorsiflexion and volar flexion at the MTP without signs of stiffness.  He will allow full dorsiflexion and volar flexion without signs of pain at the extremes of movement.  He is appropriate in conversation and affect.    A: left-sided dorsal foot and left-sided great toe pain status post crush injury 6 months ago.  Possible causalgia diagnosis.    Plan: Patient had nutrition counseling out reach visit 12/8/2022.  Patient has a mental health specialist follow-up visit tomorrow morning at 9:45 am.  Patient has his first intake visit with the pain clinic 1/6/2023, as well as EMG 1/10/2023.  I did confirm these visits with the patient and with his [facility].  I did agree to give him additional work restrictions for 1 more month.  Work restriction was provided.  He does have an intake with occupational medicine coming up within the next 2 to 3 weeks.

## 2022-12-12 NOTE — LETTER
"  12/12/2022      RE: Victor Manuel Saavedra  [facility] [place] [address]     Dear Colleague,    Thank you for referring your patient, Victor Manuel Saavedra, to the [facility] [place]. Please see a copy of my visit note below.    Victor Manuel Saavedra is seen in clinic today. Pavan, certified athletic trainer is also present for today's visit, for follow-up of left-sided dorsal foot and left-sided great toe pain status post crush injury 6 months ago, and for additional work restriction.  His QRC, Kaity, is present by phone.    The pain continues to keep him from weightbearing without the use of the boot.  He does remove the boot for physical therapy and for his range of motion exercises for the toe.  He has been consistent in this.  Physical therapy did try phonophoresis.  He indicates he will tend to isolate himself in his home, and avoid activities outside the home, due to pain.  He has had no further episodes of redness involving the dorsum of the foot but he does notice a \"burning\" sensation along the dorsum of the foot, centered over the area of the shaft of the first metatarsal and MTP.     He indicates that he has been consistent with a 4-hour shift at work in a seated job and is able to tolerate it.  But he describes persistent pain in the area of the great toe that keeps him from ambulating without the boot, and ambulation remains painful while in the boot. He tried a short time in a regular shoe with a half steel toed insert and it was too uncomfortable.  He points to the metatarsal shaft of the first toe as the focal area that was uncomfortable with bearing weight He has been. He works on range of motion exercises for the great toe at home.  He denies numbness and tingling in the foot.    I referred pt to occupational medicine, and also referred to pain medicine clinic for consultation to see if it would be reasonable to consider nerve block in the setting of possible causalgia " dagnosis.        H/O acute left foot injury 5/14/2022:  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  As he did so, his left great toe at the MTP was forced into plantarflexion, as the dorsum of the MTP happened to be levered against a cross beam concomitantly.   Has had pain over the dorsum of his large toe since.                    3 views left foot radiographs 9/29/2022 2:14 PM     History: AP, lateral and oblique; Left foot pain     Comparison: 5/24/2022, MR 8/2/2022     Findings:     Standing AP, oblique, and lateral  views of the left foot were  obtained.      No acute osseous abnormality.       Lisfranc articulation alignment is congruent on these non-weight  bearing images.     No substantial degenerative change. Presumed bone island of the first  metatarsal head.     Soft tissue is unremarkable.                                                                      Impression:  1. No acute osseous abnormality.  2. No substantial degenerative change.     EDUAR MARTIN            EXAM: MR FOOT LEFT W/O CONTRAST  LOCATION: [facility]  DATE/TIME: 8/2/2022 11:17 AM     INDICATION: Left great toe injury, persistent pain despite 2 months of conservative management (post op shoe, NSAIDS, ice, PT, relative rest). Concern for bone stress injury.  COMPARISON: None.  TECHNIQUE: Unenhanced.     FINDINGS:      JOINTS AND BONES:   -There is probably mild marrow edema versus less likely signal artifact involving the distal phalanges of the first through fourth toes, most marked along the distal whit (series 4 images 10, 15, 19, 22). No other bony abnormality. No joint abnormality.   No joint effusion or synovitis.     TENDONS:   -No tendon tear, tendinopathy, or tenosynovitis.      LIGAMENTS:    -Lisfranc ligament: Intact. No subluxation.     MUSCLES AND SOFT TISSUES:   -No muscle atrophy or edema. No soft tissue mass or fluid collection.                                                                      IMPRESSION:  1.  Mild marrow edema involving the distal phalanges of the first through fourth toes. This is nonspecific, acute traumatic or repetitive contusions most likely.  2.  No additional abnormality.           PMH:  Procedure /Surgery  Excision of capillary hemangioma left ankle  Date of Procedure/Surgery- 4/12/17 / [facility]        PMH:  Past Medical History:   Diagnosis Date     Infection due to 2019 novel coronavirus 10/2022     Nicotine dependence 11/30/2022       Active problem list:  Patient Active Problem List   Diagnosis     Dysthymic Disorder     Traumatic Urethral Stricture     History of seizure     Nicotine dependence       FH:  No family history on file.    SH:  Social History     Socioeconomic History     Marital status: Single     Spouse name: Not on file     Number of children: Not on file     Years of education: Not on file     Highest education level: Not on file   Occupational History     Not on file   Tobacco Use     Smoking status: Some Days     Smokeless tobacco: Never   Substance and Sexual Activity     Alcohol use: Yes     Drug use: Never     Sexual activity: Not on file   Other Topics Concern     Not on file   Social History Narrative    Works as cell tower , working half time.  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  Brother in [place].  Sister in [place], no contact with father.  Was in the [employer] for two years.  Injured there.      Social Determinants of Health     Financial Resource Strain: Not on file   Food Insecurity: Not on file   Transportation Needs: Not on file   Physical Activity: Not on file   Stress: Not on file   Social Connections: Not on file   Intimate Partner Violence: Not on file   Housing Stability: Not on file       MEDS:  See EMR, reviewed  ALL:  See EMR,  reviewed    REVIEW OF SYSTEMS:  CONSTITUTIONAL:NEGATIVE for fever, chills, change in weight  INTEGUMENTARY/SKIN: NEGATIVE for worrisome rashes, moles or lesions  EYES: NEGATIVE for vision changes or irritation  ENT/MOUTH: NEGATIVE for ear, mouth and throat problems  RESP:NEGATIVE for significant cough or SOB  BREAST: NEGATIVE for masses, tenderness or discharge  CV: NEGATIVE for chest pain, palpitations or peripheral edema  GI: NEGATIVE for nausea, abdominal pain, heartburn, or change in bowel habits  :NEGATIVE for frequency, dysuria, or hematuria  :NEGATIVE for frequency, dysuria, or hematuria  NEURO: NEGATIVE for weakness, dizziness or paresthesias  ENDOCRINE: NEGATIVE for temperature intolerance, skin/hair changes  HEME/ALLERGY/IMMUNE: NEGATIVE for bleeding problems  PSYCHIATRIC: NEGATIVE for changes in mood or affect          Objective: He is examined without the boot.  There is no redness or skin discoloration.  It is not warm or cold to the touch.  I do not see signs of swelling in the soft tissues and there is no palpable effusion at the MTP.  He still has excellent range of motion passively to dorsiflexion and volar flexion at the MTP without signs of stiffness.  He will allow full dorsiflexion and volar flexion without signs of pain at the extremes of movement.  He is appropriate in conversation and affect.    A: left-sided dorsal foot and left-sided great toe pain status post crush injury 6 months ago.  Possible causalgia diagnosis.    Plan: Patient had nutrition counseling out reach visit 12/8/2022.  Patient has a mental health specialist follow-up visit tomorrow morning at 9:45 am.  Patient has his first intake visit with the pain clinic 1/6/2023, as well as EMG 1/10/2023.  I did confirm these visits with the patient and with his [facility].  I did agree to give him additional work restrictions for 1 more month.  Work restriction was provided.  He does have an intake with occupational medicine coming up  within the next 2 to 3 weeks.                    Again, thank you for allowing me to participate in the care of your patient.      Sincerely,    Gallito Bonilla MD

## 2022-12-12 NOTE — LETTER
2022    Victor Manuel Saavedra  [address] [place] [address]  [phone] (home)     :     1990      To Whom it May Concern:    This patient was seen today in follow-up for his left great toe injury.   From 12/10/22 to 1/10/23 he will continue to be limited to a 4-hour shift only, from 12 PM to 4 PM.  He will be limited to a seated job during these 4-hour shifts.  He will continue with his physical therapy program during this time.  He will continue with his cam walker boot at work.  He will be seen by the pain clinic and occupational medicine.      Sincerely,        Gallito Bonilla MD

## 2022-12-13 ENCOUNTER — VIRTUAL VISIT (OUTPATIENT)
Dept: BEHAVIORAL HEALTH | Facility: CLINIC | Age: 32
End: 2022-12-13
Attending: INTERNAL MEDICINE
Payer: COMMERCIAL

## 2022-12-13 DIAGNOSIS — F90.9 ATTENTION DEFICIT HYPERACTIVITY DISORDER (ADHD), UNSPECIFIED ADHD TYPE: ICD-10-CM

## 2022-12-13 DIAGNOSIS — F34.1 DYSTHYMIC DISORDER: Primary | ICD-10-CM

## 2022-12-13 PROCEDURE — 90837 PSYTX W PT 60 MINUTES: CPT | Mod: GT

## 2022-12-13 ASSESSMENT — COLUMBIA-SUICIDE SEVERITY RATING SCALE - C-SSRS
MOST LETHAL DATE: 60266
TOTAL  NUMBER OF PREPARATORY ACTS LIFETIME: 1
4. HAVE YOU HAD THESE THOUGHTS AND HAD SOME INTENTION OF ACTING ON THEM?: NO
LETHALITY/MEDICAL DAMAGE CODE FIRST ACTUAL ATTEMPT: NO PHYSICAL DAMAGE OR VERY MINOR PHYSICAL DAMAGE
5. HAVE YOU STARTED TO WORK OUT OR WORKED OUT THE DETAILS OF HOW TO KILL YOURSELF? DO YOU INTEND TO CARRY OUT THIS PLAN?: NO
TOTAL  NUMBER OF INTERRUPTED ATTEMPTS LIFETIME: NO
1. HAVE YOU WISHED YOU WERE DEAD OR WISHED YOU COULD GO TO SLEEP AND NOT WAKE UP?: YES
4. HAVE YOU HAD THESE THOUGHTS AND HAD SOME INTENTION OF ACTING ON THEM?: YES
6. HAVE YOU EVER DONE ANYTHING, STARTED TO DO ANYTHING, OR PREPARED TO DO ANYTHING TO END YOUR LIFE?: YES
2. HAVE YOU ACTUALLY HAD ANY THOUGHTS OF KILLING YOURSELF?: YES
LETHALITY/MEDICAL DAMAGE CODE FIRST POTENTIAL ATTEMPT: BEHAVIOR LIKELY TO RESULT IN INJURY BUT NOT LIKELY TO CAUSE DEATH
5. HAVE YOU STARTED TO WORK OUT OR WORKED OUT THE DETAILS OF HOW TO KILL YOURSELF? DO YOU INTEND TO CARRY OUT THIS PLAN?: YES
TOTAL  NUMBER OF ABORTED OR SELF INTERRUPTED ATTEMPTS LIFETIME: 1
LETHALITY/MEDICAL DAMAGE CODE MOST RECENT POTENTIAL ATTEMPT: BEHAVIOR LIKELY TO RESULT IN INJURY BUT NOT LIKELY TO CAUSE DEATH
REASONS FOR IDEATION PAST MONTH: COMPLETELY TO END OR STOP THE PAIN (YOU COULDN'T GO ON LIVING WITH THE PAIN OR HOW YOU WERE FEELING)
MOST RECENT DATE: 60266
FIRST ATTEMPT DATE: 60266
1. IN THE PAST MONTH, HAVE YOU WISHED YOU WERE DEAD OR WISHED YOU COULD GO TO SLEEP AND NOT WAKE UP?: YES
LETHALITY/MEDICAL DAMAGE CODE MOST LETHAL ACTUAL ATTEMPT: NO PHYSICAL DAMAGE OR VERY MINOR PHYSICAL DAMAGE
TOTAL  NUMBER OF ABORTED OR SELF INTERRUPTED ATTEMPTS PAST 3 MONTHS: NO
ATTEMPT PAST THREE MONTHS: NO
TOTAL  NUMBER OF ABORTED OR SELF INTERRUPTED ATTEMPTS LIFETIME: YES
TOTAL  NUMBER OF INTERRUPTED ATTEMPTS PAST 3 MONTHS: NO
REASONS FOR IDEATION LIFETIME: MOSTLY TO END OR STOP THE PAIN (YOU COULDN'T GO ON LIVING WITH THE PAIN OR HOW YOU WERE FEELING)
LETHALITY/MEDICAL DAMAGE CODE MOST LETHAL POTENTIAL ATTEMPT: BEHAVIOR LIKELY TO RESULT IN INJURY BUT NOT LIKELY TO CAUSE DEATH
3. HAVE YOU BEEN THINKING ABOUT HOW YOU MIGHT KILL YOURSELF?: YES
6. HAVE YOU EVER DONE ANYTHING, STARTED TO DO ANYTHING, OR PREPARED TO DO ANYTHING TO END YOUR LIFE?: NO
LETHALITY/MEDICAL DAMAGE CODE MOST RECENT ACTUAL ATTEMPT: NO PHYSICAL DAMAGE OR VERY MINOR PHYSICAL DAMAGE
ATTEMPT LIFETIME: NO
2. HAVE YOU ACTUALLY HAD ANY THOUGHTS OF KILLING YOURSELF?: NO

## 2022-12-13 NOTE — PROGRESS NOTES
[facility]: Integrated Behavioral Health  2022      Behavioral Health Clinician Progress Note    Patient Name: Victor Manuel Saavedra           Service Type:  Individual      Service Location:   [facility]     Session Start Time: 10:30  Session End Time: 11:30   Experienced technology issues. Actual session time reflected above.       Session Length: 53 - 60      Attendees: Patient     Service Modality:  Video Visit:      Provider verified identity through the following two step process.  Patient provided:  Patient  and Patient address    Telemedicine Visit: The patient's condition can be safely assessed and treated via synchronous audio and visual telemedicine encounter.      Reason for Telemedicine Visit: Patient has requested telehealth visit    Originating Site (Patient Location): Patient's home    Distant Site (Provider Location): [facility]: [facility]    Consent:  The patient/guardian has verbally consented to: the potential risks and benefits of telemedicine (video visit) versus in person care; bill my insurance or make self-payment for services provided; and responsibility for payment of non-covered services.     Patient would like the video invitation sent by:  My Chart    Mode of Communication:  Video Conference via [facility]    Distant Location (Provider):  On-site    As the provider I attest to compliance with applicable laws and regulations related to telemedicine.    Visit Activities (Refresh list every visit): NEW and [facility] Only    Diagnostic Assessment Date: IP  Treatment Plan Review Date: IP  See Flowsheets for today's PHQ-9 and STEPHANI-7 results  Previous PHQ-9:   PHQ-9 SCORE 2022 10/13/2022 2022   PHQ-9 Total Score MyChart 22 (Severe depression) 23 (Severe depression) 25 (Severe depression)   PHQ-9 Total Score 22 23 25     Previous STEPHANI-7:   STEPHANI-7 SCORE 2022 10/13/2022 2022   Total Score - 21 (severe anxiety) 21 (severe  "anxiety)   Total Score 18 21 21   Some encounter information is confidential and restricted. Go to Review Flowsheets activity to see all data.       DATA  Extended Session (60+ minutes): No  Interactive Complexity: No  Crisis: No  [facility] Patient: No    Treatment Objective(s) Addressed in This Session:  Target Behavior(s): disease management/lifestyle changes related to chrinic pain from workplace injury and subsiquent emotional distress.    Depressed Mood: Increase interest, engagement, and pleasure in doing things  Decrease frequency and intensity of feeling down, depressed, hopeless  Identify negative self-talk and behaviors: challenge core beliefs, myths, and actions  Adjustment Difficulties: will develop coping/problem-solving skills to facilitate more adaptive adjustment    Current Stressors / Issues:  Victor Manuel met with [facility] Intern to explore [facility] services and identify mental health supports the patient would find helpful. Patient requested mental health referral from internal medicine provider Dr. Barrios to address concerns with emotional and functional distress exacerbated by recent work injury.    Victor Manuel stated he has previously managed his mental health without additional support. However, recent mobility limitations and pain from a work injury sustained in spring of 2022 increased presence of mental health symptoms. Indicates primary concerns as racing thoughts, difficulty concentrating, low energy, and significantly reduced apatite. Coping strategies such as working out, going to work, and rock climbing are now inaccessible to him. Reports spending most of his time alone at his home researching topics that interest him. Described feeling isolated from others and society as a hole. Explained that not having a career or steady social connections makes him \"an outsider.\" Stated he believes that ADHD symptoms are the reason staying at one job and maintaining social connections are difficult. Explored that he feels lost " "without these goals. Discussed that goals can be different for everyone. Introduced the concept of value clarification which can help guide personal goals. Walked through an example. Stated he would like to try values clarification for next session.    Physical health history/concerns include an injury to his food which he sustained in June 2022 working as a . Experiences pain daily which he currently rates at a 4-5/10. Noted pain was previously 8-9/10 immediately after the injury occurred.    Gathered mental health history which includes historical diagnoses of depression, anxiety, and ADHD. Notes that anxiety and depression have worsened after his work injury. Brought himself to the ER in July 2022 for acute anxiety stating \"I thought I was going to die.\" Victor Manuel was admitted into [facility] and discharged same-day. Prescribed Lexapro for mood concerns but discontinued use citing it made him \"feel foggy.\" Historically was prescribed ritalin and trazodone as a child for ADHD.    Interventions utilized during session included positive psychotherapy to build rapport, ACT to identify motivated areas for change, and CBT concepts for homework.      Progress on Treatment Objective(s) / Homework:  New Objective established this session - PREPARATION (Decided to change - considering how); Intervened by negotiating a change plan and determining options / strategies for behavior change, identifying triggers, exploring social supports, and working towards setting a date to begin behavior change    Motivational Interviewing    MI Intervention: Expressed Empathy/Understanding, Supported Autonomy, Collaboration, Evocation, Open-ended questions, Reflections: simple and complex and Reframe     Change Talk Expressed by the Patient: Desire to change Reasons to change Need to change    Provider Response to Change Talk: E - Evoked more info from patient about behavior change, A - Affirmed patient's thoughts, decisions, or " "attempts at behavior change, R - Reflected patient's change talk and S - Summarized patient's change talk statements      Care Plan review completed: No    Medication Review:  No current psychiatric medications prescribed    Medication Compliance:  NA    Changes in Health Issues:   None reported    Chemical Use Review:   Substance Use: Chemical use reviewed, no active concerns identified      Tobacco Use: No change in amount of tobacco use since last session.  Patient declined discussion at this time    Assessment: Current Emotional / Mental Status (status of significant symptoms):  Risk status (Self / Other harm or suicidal ideation)  Patient has had a history of suicidal ideation: see CSSR. and suicide attempts: See CSSR.  Patient denies current fears or concerns for personal safety.  Patient denies current or recent suicidal ideation or behaviors.  Patient denies current or recent homicidal ideation or behaviors.  Patient denies current or recent self injurious behavior or ideation.  Patient denies other safety concerns.  A safety and risk management plan has not been developed at this time, however patient was encouraged to call [place] [phone] should there be a change in any of these risk factors.    Nelson Suicide Severity Rating Scale (Lifetime/Recent)  Nelson Suicide Severity Rating (Lifetime/Recent) 12/13/2022   1. Wish to be Dead (Lifetime) 1   Wish to be Dead Description (Lifetime) High school - experienced bullyinh. Endorsed suicidal ideation and wish to be dead.   1. Wish to be Dead (Past 1 Month) 1   Wish to be Dead Description (Past 1 Month) \"It would be easier to not wake up\"   2. Non-Specific Active Suicidal Thoughts (Lifetime) 1   Non-Specific Active Suicidal Thought Description (Lifetime) Senior year high school. See question 1.   2. Non-Specific Active Suicidal Thoughts (Past 1 Month) 0   3. Active Suicidal Ideation with any Methods (Not Plan) Without Intent to Act (Lifetime) 1   Active " "Suicidal Ideation with any Methods (Not Plan) Description (Lifetime) See question 1 and 2. Stated if he were put in a situation where his life was in danger he would not remove himself.   3. Active Suicidal Ideation with any Methods (Not Plan) Without Intent to Act (Past 1 Month) 0   4. Active Suicidal Ideation with Some Intent to Act, Without Specific Plan (Lifetime) 1   Active Suicidal Ideation with Some Intent to Act, Without Specific Plan Description (Lifetime) History of self-aborted attempt when a senior in high school.   4. Active Suicidal Ideation with Some Intent to Act, Without Specific Plan (Past 1 Month) 0   5. Active Suicidal Ideation with Specific Plan and Intent (Lifetime) 1   Active Suicidal Ideation with Specific Plan and Intent Description (Lifetime) History of self-aborted attempt when a senior in high school. Plan was to ingest toxic doses of alum which is used in the pickling process and accessable at grocery stores.   5. Active Suicidal Ideation with Specific Plan and Intent (Past 1 Month) 0   Most Severe Ideation Rating (Lifetime) 4   Most Severe Ideation Rating (Past 1 Month) 1   Frequency (Lifetime) 5   Frequency (Past 1 Month) 2   Duration (Lifetime) 5   Duration (Past 1 Month) 3   Controllability (Lifetime) 4   Controllability (Past 1 Month) 1   Deterrents (Lifetime) 1   Deterrents (Past 1 Month) 1   Reasons for Ideation (Lifetime) 4   Reasons for Ideation (Past 1 Month) 5   Actual Attempt (Lifetime) 0   Actual Attempt (Past 3 Months) 0   Interrupted Attempts (Lifetime) 0   Interrupted Attempts (Past 3 Months) 0   Aborted or Self-Interrupted Attempt (Lifetime) 1   Total Number of Aborted or Self-Interrupted Attempts (Lifetime) 1   Aborted or Self-Interrupted Attempt Description (Lifetime) Patient was a senior in high school experiencing bullying. Patient was going to ingest toxic levels of alum which he bought from the grocery store. Stopped himself stating \"it wasn't worth it,\" and that " "following through would have \"proved they were right and I couldn't let them win.\" No other attempts.   Aborted or Self-Interrupted Attempt (Past 3 Months) 0   Preparatory Acts or Behavior (Lifetime) 1   Total Number of Preparatory Acts (Lifetime) 1   Preparatory Acts or Behavior Description (Lifetime) Buying alum.   Preparatory Acts or Behavior (Past 3 Months) 0   Most Recent Attempt Date 82597   Actual Lethality/Medical Damage Code (Most Recent Attempt) 0   Potential Lethality Code (Most Recent Attempt) 1   Most Lethal Attempt Date 92577   Actual Lethality/Medical Damage Code (Most Lethal Attempt) 0   Potential Lethality Code (Most Lethal Attempt) 1   Initial/First Attempt Date 96421   Actual Lethality/Medical Damage Code (Initial/First Attempt) 0   Potential Lethality Code (Initial/First Attempt) 1   Calculated C-SSRS Risk Score (Lifetime/Recent) Moderate Risk   Some encounter information is confidential and restricted. Go to Review Flowsheets activity to see all data.       Today Victor Manuel Saavedra reports passive SI that is easily managed. Denied current plan, means, or intent. In addition, he has notable risk factors for self-harm, including single status, anxiety and previous suicide attempts. However, risk is mitigated by ability to volunteer a safety plan, history of seeking help when needed and strong future orientation. Therefore, based on all available evidence including the factors cited above, he does not appear to be at imminent risk for self-harm, does not meet criteria for a 72-hr hold, and therefore remains appropriate for ongoing outpatient level of care.     Appearance:   Appropriate   Eye Contact:   Good   Psychomotor Behavior: Restless   Attitude:   Cooperative   Orientation:   All  Speech   Rate / Production: Talkative Normal    Volume:  Normal   Mood:    Anxious  Normal  Affect:    Appropriate   Thought Content:  Clear   Thought Form:  Coherent  Logical   Insight:    Good     Diagnoses:  1. " Dysthymic disorder    2. Attention deficit hyperactivity disorder (ADHD), unspecified ADHD type        Collateral Reports Completed:  Rishabh's chart and referral documentation were reviewed.    Plan: (Homework, other):  Victor Manuel was given information about behavioral services and scheduled a follow up appointment with the clinic [facility] in 1 week.  He was also given information about mental health symptoms and treatment options .  Victor Manuel will complete the core values clarification exercise for next session. Victor Manuel was sent the exercise through [employer]. CD Recommendations: No indications of CD issues.      Macy Mendez  Behavioral Health Clinician Intern  Supervised by Florentin Edwards Psy.D  [facility]     December 13, 2022

## 2022-12-20 ENCOUNTER — VIRTUAL VISIT (OUTPATIENT)
Dept: BEHAVIORAL HEALTH | Facility: CLINIC | Age: 32
End: 2022-12-20
Payer: COMMERCIAL

## 2022-12-20 DIAGNOSIS — F32.A DEPRESSION, UNSPECIFIED DEPRESSION TYPE: Primary | ICD-10-CM

## 2022-12-20 DIAGNOSIS — F90.9 ATTENTION DEFICIT HYPERACTIVITY DISORDER (ADHD), UNSPECIFIED ADHD TYPE: ICD-10-CM

## 2022-12-20 DIAGNOSIS — F41.9 ANXIETY DISORDER, UNSPECIFIED TYPE: ICD-10-CM

## 2022-12-20 PROCEDURE — 90837 PSYTX W PT 60 MINUTES: CPT | Mod: GT

## 2022-12-20 ASSESSMENT — PATIENT HEALTH QUESTIONNAIRE - PHQ9
SUM OF ALL RESPONSES TO PHQ QUESTIONS 1-9: 27
SUM OF ALL RESPONSES TO PHQ QUESTIONS 1-9: 27
10. IF YOU CHECKED OFF ANY PROBLEMS, HOW DIFFICULT HAVE THESE PROBLEMS MADE IT FOR YOU TO DO YOUR WORK, TAKE CARE OF THINGS AT HOME, OR GET ALONG WITH OTHER PEOPLE: EXTREMELY DIFFICULT

## 2022-12-20 NOTE — PROGRESS NOTES
[facility]: Integrated Behavioral Health  December 20, 2022      Behavioral Health Clinician Progress Note    Patient Name: Victor Manuel Saavedra           Service Type:  Individual      Service Location:   [facility]     Session Start Time: 11:00am  Session End Time: 11:58am      Session Length: 53 - 60      Attendees: Patient     Service Modality:  Video Visit:   Victor Manuel experienced technology issues with his mobile device. Visit was audio only though [facility].      Provider verified identity through the following two step process.  Patient provided:  Patient is known previously to provider    Telemedicine Visit: The patient's condition can be safely assessed and treated via synchronous audio and visual telemedicine encounter.      Reason for Telemedicine Visit: Patient has requested telehealth visit    Originating Site (Patient Location): Patient's home    Distant Site (Provider Location): [facility]: [facility]    Consent:  The patient/guardian has verbally consented to: the potential risks and benefits of telemedicine (video visit) versus in person care; bill my insurance or make self-payment for services provided; and responsibility for payment of non-covered services.     Patient would like the video invitation sent by:  My Chart    Mode of Communication:  Video Conference via [facility]    Distant Location (Provider):  On-site    As the provider I attest to compliance with applicable laws and regulations related to telemedicine.    Visit Activities (Refresh list every visit): [facility] Only    Diagnostic Assessment Date: IP  Treatment Plan Review Date: IP  See Flowsheets for today's PHQ-9 and STEPHANI-7 results  Previous PHQ-9:   PHQ-9 SCORE 10/13/2022 11/17/2022 12/20/2022   PHQ-9 Total Score MyChart 23 (Severe depression) 25 (Severe depression) 27 (Severe depression)   PHQ-9 Total Score 23 25 27     Previous STEPHANI-7:   STEPHANI-7 SCORE 9/16/2022 10/13/2022 11/17/2022   Total Score - 21  "(severe anxiety) 21 (severe anxiety)   Total Score 18 21 21   Some encounter information is confidential and restricted. Go to Review Flowsheets activity to see all data.         DATA  Extended Session (60+ minutes): No  Interactive Complexity: No  Crisis: No  [facility] Patient: No    Treatment Objective(s) Addressed in This Session:  Target Behavior(s): disease management/lifestyle changes realated to depression, anxiety, and ADHD.    Depressed Mood: Increase interest, engagement, and pleasure in doing things  Decrease frequency and intensity of feeling down, depressed, hopeless  Identify negative self-talk and behaviors: challenge core beliefs, myths, and actions  Adjustment Difficulties: will develop coping/problem-solving skills to facilitate more adaptive adjustment    Current Stressors / Issues:  [facility] Intern met with Victor Manuel to continue treatment of depressive and anxiety symptoms.    Reviewed values clarification exercise given as homework last week. Victor Manuel stated he did not feel a connection to any of the words. Described that choosing a word felt \"disingenuine\" and would only be to \"fit in\" because he was \"off the programming\" compared to other people. When asked to expand on what he meant, Victor Manuel said he did not have a career, a house, or a long term relationship - things that are seen as markers of success to others. Explored if Victor Manuel wanted these things in his life. Indicated he did not but that he also did not know what he wanted. Expressed a strong interest in CBT stating he feels like it would help him \"restructure his brain.\" Provided education on CBT principals, methods, and techniques. Victor Maneul would like to try the values exercise again and choose values not from the list.    Reviewed today's PHQ-9 and STEPHANI-7 results. Described passive suicidal ideation Victor Manuel indicated he was concerned about his physical health. Mentioned he drinks between 4-5 Boost protein shakes per day and he feels repulsed by food. Stated \"I'm in " "anorexia mode.\" Encouraged    [facility] Intern presented overview of treatment planning for next session.       Progress on Treatment Objective(s) / Homework:  New Objective established this session - PREPARATION (Decided to change - considering how); Intervened by negotiating a change plan and determining options / strategies for behavior change, identifying triggers, exploring social supports, and working towards setting a date to begin behavior change    Motivational Interviewing    MI Intervention: Supported Autonomy, Collaboration, Evocation, Open-ended questions, Reflections: simple and complex and Rolled with resistance: Emphasized patient autonomy, Amplified reflection (weaker or stronger meaning), Shifted topic to defuse resistance and Evoked patient agenda     Change Talk Expressed by the Patient: Desire to change Reasons to change    Provider Response to Change Talk: E - Evoked more info from patient about behavior change, A - Affirmed patient's thoughts, decisions, or attempts at behavior change, R - Reflected patient's change talk and S - Summarized patient's change talk statements      Care Plan review completed: No    Medication Review:  No changes to current psychiatric medication(s)    Medication Compliance:  Yes    Changes in Health Issues:   None reported    Chemical Use Review:   Substance Use: Chemical use reviewed, no active concerns identified      Tobacco Use: No change in amount of tobacco use since last session.  Patient declined discussion at this time    Assessment: Current Emotional / Mental Status (status of significant symptoms):  Risk status (Self / Other harm or suicidal ideation)  Patient has had a history of suicidal ideation: passive ideation and suicide attempts: previous aborted attempt. See CSSR.  Patient denies current fears or concerns for personal safety.  Patient reports the following current or recent suicidal ideation or behaviors: passive suicidal ideation: not having to deal " "with life if he were dead. Denies current plan, means, or intent..  Patient denies current or recent homicidal ideation or behaviors.  Patient denies current or recent self injurious behavior or ideation.  Patient reports other safety concerns including current eating patterns. States drinking 4-5 Boost shakes a day. Concerned abour the amount of nutrients he is getting and knows it is not sustanable for his body; Will be talking to his PCP..  A safety and risk management plan has not been developed at this time, however patient was encouraged to call [place] [phone] should there be a change in any of these risk factors.    [place] Suicide Severity Rating Scale (Lifetime/Recent)  [place] Suicide Severity Rating (Lifetime/Recent) 12/13/2022   1. Wish to be Dead (Lifetime) 1   Wish to be Dead Description (Lifetime) High school - experienced bullyinh. Endorsed suicidal ideation and wish to be dead.   1. Wish to be Dead (Past 1 Month) 1   Wish to be Dead Description (Past 1 Month) \"It would be easier to not wake up\"   2. Non-Specific Active Suicidal Thoughts (Lifetime) 1   Non-Specific Active Suicidal Thought Description (Lifetime) Senior year high school. See question 1.   2. Non-Specific Active Suicidal Thoughts (Past 1 Month) 0   3. Active Suicidal Ideation with any Methods (Not Plan) Without Intent to Act (Lifetime) 1   Active Suicidal Ideation with any Methods (Not Plan) Description (Lifetime) See question 1 and 2. Stated if he were put in a situation where his life was in danger he would not remove himself.   3. Active Suicidal Ideation with any Methods (Not Plan) Without Intent to Act (Past 1 Month) 0   4. Active Suicidal Ideation with Some Intent to Act, Without Specific Plan (Lifetime) 1   Active Suicidal Ideation with Some Intent to Act, Without Specific Plan Description (Lifetime) History of self-aborted attempt when a senior in high school.   4. Active Suicidal Ideation with Some Intent to Act, Without " "Specific Plan (Past 1 Month) 0   5. Active Suicidal Ideation with Specific Plan and Intent (Lifetime) 1   Active Suicidal Ideation with Specific Plan and Intent Description (Lifetime) History of self-aborted attempt when a senior in high school. Plan was to ingest toxic doses of alum which is used in the pickling process and accessable at grocery stores.   5. Active Suicidal Ideation with Specific Plan and Intent (Past 1 Month) 0   Most Severe Ideation Rating (Lifetime) 4   Most Severe Ideation Rating (Past 1 Month) 1   Frequency (Lifetime) 5   Frequency (Past 1 Month) 2   Duration (Lifetime) 5   Duration (Past 1 Month) 3   Controllability (Lifetime) 4   Controllability (Past 1 Month) 1   Deterrents (Lifetime) 1   Deterrents (Past 1 Month) 1   Reasons for Ideation (Lifetime) 4   Reasons for Ideation (Past 1 Month) 5   Actual Attempt (Lifetime) 0   Actual Attempt (Past 3 Months) 0   Interrupted Attempts (Lifetime) 0   Interrupted Attempts (Past 3 Months) 0   Aborted or Self-Interrupted Attempt (Lifetime) 1   Total Number of Aborted or Self-Interrupted Attempts (Lifetime) 1   Aborted or Self-Interrupted Attempt Description (Lifetime) Patient was a senior in high school experiencing bullying. Patient was going to ingest toxic levels of alum which he bought from the grocery store. Stopped himself stating \"it wasn't worth it,\" and that following through would have \"proved they were right and I couldn't let them win.\" No other attempts.   Aborted or Self-Interrupted Attempt (Past 3 Months) 0   Preparatory Acts or Behavior (Lifetime) 1   Total Number of Preparatory Acts (Lifetime) 1   Preparatory Acts or Behavior Description (Lifetime) Buying alum.   Preparatory Acts or Behavior (Past 3 Months) 0   Most Recent Attempt Date 20650   Actual Lethality/Medical Damage Code (Most Recent Attempt) 0   Potential Lethality Code (Most Recent Attempt) 1   Most Lethal Attempt Date 30012   Actual Lethality/Medical Damage Code (Most " Lethal Attempt) 0   Potential Lethality Code (Most Lethal Attempt) 1   Initial/First Attempt Date 46827   Actual Lethality/Medical Damage Code (Initial/First Attempt) 0   Potential Lethality Code (Initial/First Attempt) 1   Calculated C-SSRS Risk Score (Lifetime/Recent) Moderate Risk   Some encounter information is confidential and restricted. Go to Review Flowsheets activity to see all data.       Appearance:   Unable to assess   Eye Contact:   Unable to assess   Psychomotor Behavior: Unable to assess   Attitude:   Cooperative  Brice  Orientation:   All  Speech   Rate / Production: Slow    Volume:  Normal   Mood:    Depressed  Apathetic  Affect:    Blunted   Thought Content:  Clear   Thought Form:  Coherent  Logical   Insight:    Good     Diagnoses:  1. Depression, unspecified depression type    2. Anxiety disorder, unspecified type    3. Attention deficit hyperactivity disorder (ADHD), unspecified ADHD type        Collateral Reports Completed:  Not Applicable    Plan: (Homework, other):  Victor Manuel was given information about behavioral services and scheduled a follow up appointment with the clinic [facility] in 3 weeks.  He was also given information about mental health symptoms and treatment options and discussed possible referral options to long-term CBT therapist.  Victor Manuel will complete the core values clarification exercise for next session with client-suggested modifications. CD Recommendations: No indications of CD issues.       Macy Mendez  Behavioral Health Clinician Intern  Supervised by Florentin Edwards Psy.D  [facility]     December 20, 2022

## 2022-12-22 ENCOUNTER — PATIENT OUTREACH (OUTPATIENT)
Dept: CARE COORDINATION | Facility: CLINIC | Age: 32
End: 2022-12-22

## 2022-12-22 ENCOUNTER — VIRTUAL VISIT (OUTPATIENT)
Dept: INTERNAL MEDICINE | Facility: CLINIC | Age: 32
End: 2022-12-22
Payer: OTHER MISCELLANEOUS

## 2022-12-22 DIAGNOSIS — F50.9 EATING DISORDER, UNSPECIFIED TYPE: ICD-10-CM

## 2022-12-22 DIAGNOSIS — F34.1 DYSTHYMIC DISORDER: ICD-10-CM

## 2022-12-22 DIAGNOSIS — M79.673 PAIN OF FOOT, UNSPECIFIED LATERALITY: Primary | ICD-10-CM

## 2022-12-22 PROCEDURE — 99214 OFFICE O/P EST MOD 30 MIN: CPT | Mod: 95 | Performed by: INTERNAL MEDICINE

## 2022-12-22 NOTE — PROGRESS NOTES
Clinic Care Coordination Contact  Community Health Worker Initial Outreach    CHW Initial Information Gathering:  Referral Source: PCP  Current living arrangement:: I live in a private home with family  Type of residence:: Private home - stairs  Community Resources: None  Supplies Currently Used at Home: None  Equipment Currently Used at Home: other (see comments) (patient has a boot on his foot)  Informal Support system:: Family  No PCP office visit in Past Year: No  Transportation means:: Accessible car  CHW Additional Questions  MyChart active?: Yes  Patient sent Social Determinants of Health questionnaire?: Yes    CHW Note:    CHW contacted patient regarding a referral to CCC. CHW introduced self and role of CCC.    Patient states he is currently without health insurance and would like to explore what options are available. Patient shared he would like to explore financial resources until he is able to get on his feet.    CHW scheduled patient with CCC HARMONY Dow on 12/28/22 at 3PM to discuss insurance options and financial resources.     Patient accepts CC: Yes. Patient scheduled for assessment with FLAKITO Dow on 12/28/22 at 3PM. Patient noted desire to discuss financial resources/insurance options.       Heather Martin  Community Health Worker   [facility]  Clinic Care Coordination  [facility]   [email or web address]  Office: [phone]

## 2022-12-22 NOTE — PROGRESS NOTES
Victor Manuel is a 32 year old who is being evaluated via a billable video visit.      How would you like to obtain your AVS? MyChart  If the video visit is dropped, the invitation should be resent by: Text to cell phone: [phone]  Will anyone else be joining your video visit? No          Assessment & Plan     (M79.283) Pain of foot, unspecified laterality  (primary encounter diagnosis)  Comment: left, stemming from injury in May 2022  Plan: Primary Care - Care Coordination Referral        Apparently lost his job, insurance likely to be gone soon. Recommended help with this and his upcoming specialty appointments which have been canceled due to various reasons.    (F50.9) Eating disorder, unspecified type  Comment: ?restrictive vs other  Plan: Primary Care - Care Coordination Referral        Per patient, weight appears to be stable, down about 10-15 lbs from his baseline. Overall a relatively poor diet, has nutritionist appointment in Jan coming up.    (F34.1) Dysthymic disorder  Comment: chronic  Plan: works with behavioral health. No medications right now.  --questionable hx of ADD. Did not discuss today.                 Return in about 6 months (around 6/22/2023) for Follow up, with me.    Enrique Rubio MD  [employer]    Subjective   Victor Manuel is a 32 year old accompanied by his self, presenting for the following health issues:  Office Visit (Establish care-referral for ADHD and nutrition.) and Recheck Medication    Multiple concerns---mostly starting in May 2022 related to an injury of his foot.  Was on work comp, then that stopped after the first month--he is now partially working, still not healed well enough to return to work full time. He is followed by podiatry and sports medicine during this time   Recently referral to pain medicine clinic--coming up in the next 3 weeks.  Dietary concern--weight loss as he has lost some appetite as well as concerns of getting out to the grocery  store   Mostly drinking sports shakes and now added some ramen. States he weighs somewhere in the 150s. Weight at time of original   Was to go to [facility] as inpatient though unable to obtain FMLA due to short time status of his time in hi current employment.    History of Present Illness       Reason for visit:  Establishing care    He eats 0-1 servings of fruits and vegetables daily.He consumes 6 sweetened beverage(s) daily.He exercises with enough effort to increase his heart rate 9 or less minutes per day.  He exercises with enough effort to increase his heart rate 3 or less days per week.   He is taking medications regularly.             Review of Systems         Objective           Vitals:  No vitals were obtained today due to virtual visit.    Physical Exam   GENERAL: Healthy, alert and no distress  EYES: Eyes grossly normal to inspection.  No discharge or erythema, or obvious scleral/conjunctival abnormalities.  RESP: No audible wheeze, cough, or visible cyanosis.  No visible retractions or increased work of breathing.    SKIN: Visible skin clear. No significant rash, abnormal pigmentation or lesions.  NEURO: Cranial nerves grossly intact.  Mentation and speech appropriate for age.  PSYCH: Mentation appears normal, affect normal/bright, judgement and insight intact, normal speech and appearance well-groomed.                Video-Visit Details    Type of service:  Video Visit     Originating Location (pt. Location): Home    Distant Location (provider location):  On-site  Platform used for Video Visit: Drew

## 2022-12-26 ENCOUNTER — PATIENT OUTREACH (OUTPATIENT)
Dept: CARE COORDINATION | Facility: CLINIC | Age: 32
End: 2022-12-26

## 2022-12-26 NOTE — PROGRESS NOTES
Contact  [facility]/Voicemail    Referral Source: Care Team  Clinical Data:  Outreach- scheduled for assessment on 12-28 and it needs to be rescheduled due to [facility] schedule.    Outreach attempted x 1. Phone not in service.  Plan: Sending My Chart message.  will try to reach patient again in 1-2 business days.    12-28 My chart message from patient who doesn't recall the appt.  Asked to be called.  Scheduled call from 12-29 at 3 PM.  Patient in communication with care team about foot pain.  RN at clinic trying to reach patient to discuss.     Plan- call patient on 12-29 at 3 PM.   Paloma Castle,   [facility]  [phone]

## 2022-12-28 ENCOUNTER — NURSE TRIAGE (OUTPATIENT)
Dept: INTERNAL MEDICINE | Facility: CLINIC | Age: 32
End: 2022-12-28

## 2022-12-28 NOTE — TELEPHONE ENCOUNTER
Attempted to call and triage patient.  No answer, voicemail is full.  Will send Acom[employer] message.

## 2022-12-29 ENCOUNTER — PATIENT OUTREACH (OUTPATIENT)
Dept: NURSING | Facility: CLINIC | Age: 32
End: 2022-12-29
Payer: COMMERCIAL

## 2022-12-29 ENCOUNTER — PATIENT OUTREACH (OUTPATIENT)
Dept: CARE COORDINATION | Facility: CLINIC | Age: 32
End: 2022-12-29

## 2022-12-29 NOTE — PROGRESS NOTES
Contact  [facility]/Voicemail    Referral Source: Care Team  Clinical Data:  Outreach  Outreach attempted x 1.  VM is full.  Have been communicating through My Chart and he said he was available today to talk.  Tomorrow, he is traveling and unavailable.  Asked him to call writer if he is available.   Plan:  will try to reach patient again in 3-5 business days.  Paloma Castle,   [facility]  [phone]

## 2022-12-29 NOTE — LETTER
[facility] CARE COORDINATION  [facility]    December 30, 2022    Victor Manuel Saavedra  [address] [place] [address]      Dear Victor Manuel,        I am a clinic care coordinator who works with Enrique Rubio MD with the [facility]. I wanted to thank you for spending the time to talk with me.  Below is a description of clinic care coordination and how I can further assist you.       The clinic care coordination team is made up of a registered nurse, , financial resource worker and community health worker who understand the health care system. The goal of clinic care coordination is to help you manage your health and improve access to the health care system. Our team works alongside your provider to assist you in determining your health and social needs. We can help you obtain health care and community resources, providing you with necessary information and education. We can work with you through any barriers and develop a care plan that helps coordinate and strengthen the communication between you and your care team.    Please feel free to contact me with any questions or concerns regarding care coordination and what we can offer.      We are focused on providing you with the highest-quality healthcare experience possible.    Sincerely,     Paloma Castle,   [facility]  [phone]      Enclosed: I have enclosed a copy of the Patient Centered Plan of Care. This has helpful information and goals that we have talked about. Please keep this in an easy to access place to use as needed.

## 2022-12-29 NOTE — LETTER
[facility]  Patient Centered Plan of Care  About Me:        Patient Name:  Victor Manuel Saavedra    YOB: 1990  Age:         32 year old   Maynor MRN:    [id number] Telephone Information:  Home Phone [phone]   Mobile [phone]   Home Phone [phone]       Address:  [address] [place] [address] Email address:  [email or web address]      Emergency Contact(s)    Name Relationship Lgl Grd Work Phone Home Phone Mobile Phone   1. AMELIA BARNEY Mother   [phone]            Primary language:  English     needed? No   [facility]:  [phone] op. 1  Other communication barriers:None    Preferred Method of Communication:     Current living arrangement: I live in a private home    Mobility Status/ Medical Equipment: Independent w/Device        Health Maintenance  Health Maintenance Reviewed: Due/Overdue   Health Maintenance Due   Topic Date Due     YEARLY PREVENTIVE VISIT  Never done     ADVANCE CARE PLANNING  Never done     DEPRESSION ACTION PLAN  Never done     HIV SCREENING  Never done     HEPATITIS C SCREENING  Never done     COVID-19 Vaccine (3 - Booster for Moderna series) 05/21/2022     INFLUENZA VACCINE (1) 09/01/2022           My Access Plan  Medical Emergency 911   Primary Clinic Line [facility] - [phone]   24 Hour Appointment Line [phone] or  [phone]) (toll-free)   24 Hour Nurse Line [phone] (toll-free)   Preferred Urgent Care Other     Preferred Hospital Other     Preferred Pharmacy [facility] - [place], [place] - [address]     Behavioral Health Crisis Line The National Suicide Prevention Lifeline at 1-536.198.2275 or Text/Call 048             My Care Team Members  Patient Care Team       Relationship Specialty Notifications Start End    Enrique Rubio MD PCP - General Internal Medicine  12/22/22     Phone: [phone] Pager: [phone] Fax: [phone]         [address] [place] [address]    Gallito Bonilla MD Assigned Musculoskeletal Provider   6/18/22     Phone: [phone] Fax: [phone]         [address] [facility] [address]    Carmelina Veras NP Assigned PCP   10/8/22     Phone: [phone] Fax: [phone] [address] [facility] [address]    Fermin Walter MD Assigned Behavioral Health Provider   10/22/22     Phone: [phone] Fax: [phone] [address] [facility] [address]    Jonel Mario MD MD Neurology  10/27/22     Phone: [phone] Fax: [phone]         [address] [facility] [id number] [facility] [address]    Heather Martin MA Community Health Worker Primary Care - CC Admissions 12/22/22     Paloma Castle LSW Lead Care Coordinator Primary Care - CC Admissions 12/29/22     Phone: [phone]                 My Care Plans  Self Management and Treatment Plan  Care Plan  Care Plan: Financial Wellbeing     Problem: Patient expresses financial resource strain     Long-Range Goal: I will apply for [facility] benefits.     Start Date: 12/29/2022 Expected End Date: 5/1/2023    Priority: High    Note:     Barriers: stressed, in pain.   Strengths: can apply for benefits.  Patient expressed understanding of goal: yes  Action steps to achieve this goal:  1. I will work with FRW to help me apply for [facility] programs.  2. I will complete any paper work.  3. I will report progress towards this goal at scheduled outreach telephone calls from the CCC team.                        Care Plan: Mental Health     Problem: Mood/Psychosocial Concerns     Goal: Establish Mental Health Support     Start Date: 12/29/2022 Expected End Date: 5/1/2023    Priority: High    Note:     Barriers: doesn't have insurance right now.  Strengths: would like to have support.   Patient expressed understanding of goal: yes  Action steps to achieve this goal:  1. I will apply for insurance.   2. I will call  [facility] or crisis lines for support.   3. I will report progress towards this goal at scheduled outreach telephone calls from the CCC team.                               Advance Care Plans/Directives Type:   No data recorded    My Medical and Care Information  Problem List   Patient Active Problem List   Diagnosis     Dysthymic Disorder     Traumatic Urethral Stricture     History of seizure     Nicotine dependence      Current Medications and Allergies:    Current Outpatient Medications   Medication Instructions     diclofenac (VOLTAREN) 2 g, Topical, 4 TIMES DAILY     hydrOXYzine (ATARAX) 25-50 mg, Oral, EVERY 6 HOURS PRN     multivitamin (MULTIVITAMIN) per tablet 1 tablet, Oral, DAILY     Vitamin D3 (VITAMIN D) 400 Units, Oral, DAILY         Care Coordination Start Date: 12/22/2022   Frequency of Care Coordination: monthly     Form Last Updated: 12/30/2022

## 2022-12-30 ASSESSMENT — ACTIVITIES OF DAILY LIVING (ADL): DEPENDENT_IADLS:: CLEANING;COOKING;LAUNDRY;SHOPPING;MEAL PREPARATION;MEDICATION MANAGEMENT

## 2022-12-30 NOTE — PROGRESS NOTES
Clinic Care Coordination Contact    Clinic Care Coordination Contact  OUTREACH    Referral Information:  Referral Source: PCP    Primary Diagnosis: Psychosocial    Chief Complaint   Patient presents with     Clinic Care Coordination - Initial        Universal Utilization: appropriate, worried about costs for appts that are not covered by worker's comp.  Clinic Utilization  Difficulty keeping appointments:: No  Compliance Concerns: No  No-Show Concerns: No  No PCP office visit in Past Year: No  Utilization    Hospital Admissions  0             ED Visits  1             No Show Count (past year)  5                Current as of: 12/30/2022 12:38 AM              Clinical Concerns:  Current Medical Concerns:  32 yr old, had work accident and injured his foot several months ago.  Foot is in a boot and patient experiences high level of pain.  This pain impacts his ability to eat as his appetite is off and food does not taste right.   Current Behavioral Concerns: Had several appts with therapist through  [facility] that were helpful, but got a bill for each and it was almost $1,000 and he cannot afford that. Hs crisis numbers and has called when needed.  He cries often. Feels that he also has ADHD and that he needs to have a comprehensive evaluation to determine if the ADHD or depression is the main problem.  He has trouble keeping track of things and his executive functioning is lower than before the accident.      Education Provided to patient: Reviewed role of care coordination and county benefits.    Pain  Pain (GOAL):: Yes  Type: Chronic (>3mo)  Location of chronic pain:: Left foot main injury around first metatarsal. Stomach from starving daily since July 2022  Radiating: No  Progression: Constant  Description of pain: Aching, Burning, Gnawing, Nagging, Penetrating, Throbbing  Chronic pain severity:: 5  Limitation of routine activities due to chronic pain:: Yes  Description: Unable to perform most daily activities  (chores, hobbies, social activities, driving)  Aggravating Factors: Activity, Inactivity, Positioning  Health Maintenance Reviewed: Due/Overdue   Health Maintenance Due   Topic Date Due     YEARLY PREVENTIVE VISIT  Never done     ADVANCE CARE PLANNING  Never done     DEPRESSION ACTION PLAN  Never done     HIV SCREENING  Never done     HEPATITIS C SCREENING  Never done     COVID-19 Vaccine (3 - Booster for Moderna series) 05/21/2022     INFLUENZA VACCINE (1) 09/01/2022       Clinical Pathway: None    Medication Management:  Medication review status: Medications reviewed and no changes reported per patient.             Functional Status:  Dependent ADLs:: Ambulation-cane, Bathing, Dressing, Eating, Grooming  Dependent IADLs:: Cleaning, Cooking, Laundry, Shopping, Meal Preparation, Medication Management  Bed or wheelchair confined:: Yes  Mobility Status: Independent w/Device  Fallen 2 or more times in the past year?: No  Any fall with injury in the past year?: Yes    Living Situation:  Current living arrangement:: I live in a private home  Type of residence:: Private home - [facility]    Lifestyle & Psychosocial Needs:    Social Determinants of Health     Tobacco Use: Low Risk      Smoking Tobacco Use: Never     Smokeless Tobacco Use: Never     Passive Exposure: Not on file   Alcohol Use: Not At Risk     Frequency of Alcohol Consumption: Monthly or less     Average Number of Drinks: 1 or 2     Frequency of Binge Drinking: Never   Financial Resource Strain: High Risk     Difficulty of Paying Living Expenses: Hard   Food Insecurity: Food Insecurity Present     Worried About Running Out of Food in the Last Year: Often true     Ran Out of Food in the Last Year: Often true   Transportation Needs: Unmet Transportation Needs     Lack of Transportation (Medical): Patient refused     Lack of Transportation (Non-Medical): Yes   Physical Activity: Inactive     Days of Exercise per Week: 0 days     Minutes of Exercise per Session: 0  min   Stress: Stress Concern Present     Feeling of Stress : Very much   Social Connections: Socially Isolated     Frequency of Communication with Friends and Family: Once a week     Frequency of Social Gatherings with Friends and Family: Never     Attends Worship Services: Never     Active Member of Clubs or Organizations: No     Attends Club or Organization Meetings: Not on file     Marital Status: Never    Intimate Partner Violence: Not on file   Depression: At risk     PHQ-2 Score: 6   Housing Stability: Low Risk      Unable to Pay for Housing in the Last Year: No     Number of Places Lived in the Last Year: 1     Unstable Housing in the Last Year: No     Diet:: Other  Inadequate nutrition (GOAL):: Yes  Tube Feeding: No  Inadequate activity/exercise (GOAL):: Yes  Significant changes in sleep pattern (GOAL): Yes  Transportation means:: Regular car     Worship or spiritual beliefs that impact treatment:: No  Mental health DX:: Yes  Mental health management concern (GOAL):: Yes  Chemical Dependency Status: No Current Concerns  Informal Support system:: None, Other        Patient was in [place] with family during the call.  He was not able to receive calls and communicated through My Chart and eventually, patient was able to call writer directly.  He lives in a house with several room mates.  Patient had been [employer] comp, but that stopped several months ago.  They continue to pay the medical bills related to his injury.  He is not able to work and is living off the savings, of about $1700 left.  After this is gone, he will not have any other assets.  He tried to get insurance, but couldn't afford the premiums.  Discussed applying on [place] Sure to see if he can get MA now and he was on the website during the call.  He would like to apply for Cash and SNAP and agreed to work with [facility] on these county benefits.  Unsure how he will pay his rent.    Frustrated with [employer] comp and how he was  treated during the process.  Not sure how he will be able to work with the foot pain.  He is not able to stand or ambulate much.  He is not able to  the kitchen to fix meals and is relying on boost shakes and Nick noodles. Feels very hungry and will go to fast food place and order food and only be able to eat 12 fries before being full.  Many foods don't taste right.      Reviewed that he could continue to use [facility] therapists but he may be billed. If he gets MA, they can back pay three months.       Resources and Interventions:  Current Resources:      Community Resources: None  Supplies Currently Used at Home: Nutritional Supplements, Wound Care Supplies, Gloves, Wipes  Equipment Currently Used at Home: cane, straight  Employment Status: , previous service, unemployed    Advance Care Plan/Directive  Advanced Care Plans/Directives on file:: No  Advanced Care Plan/Directive Status: Declined Further Information    Referrals Placed: Behavioral Health Providers, Financial Services       Care Plan:  Care Plan: Financial Wellbeing     Problem: Patient expresses financial resource strain     Long-Range Goal: I will apply for [facility] benefits.     Start Date: 12/29/2022 Expected End Date: 5/1/2023    Priority: High    Note:     Barriers: stressed, in pain.   Strengths: can apply for benefits.  Patient expressed understanding of goal: yes  Action steps to achieve this goal:  1. I will work with FRW to help me apply for [facility] programs.  2. I will complete any paper work.  3. I will report progress towards this goal at scheduled outreach telephone calls from the CCC team.                        Care Plan: Mental Health     Problem: Mood/Psychosocial Concerns     Goal: Establish Mental Health Support     Start Date: 12/29/2022 Expected End Date: 5/1/2023    Priority: High    Note:     Barriers: doesn't have insurance right now.  Strengths: would like to have support.   Patient expressed understanding of  goal: yes  Action steps to achieve this goal:  1. I will apply for insurance.   2. I will call [facility] or crisis lines for support.   3. I will report progress towards this goal at scheduled outreach telephone calls from the CCC team.                            Patient/Caregiver understanding: enrolled in care coordination. Will work with FRW to apply for [facility] benefits.      Outreach Frequency: monthly  Future Appointments              In 1 week Maricarmen Campbell MD [facility] for [facility]    In 1 week Jonel Mario MD [facility] EMG Clinic [facility]    In 2 weeks Mary Martinez PT M [facility]    In 1 month Vi Oneil RD M [facility] Nutrition [facility]          Plan: SW CC to call in two weeks.  [facility] SW will continue to monitor, support patient with current goals and will be available to assist as needs arise. [facility] CHW will reach out to patient on a monthly basis to discuss progression of goals.      Paloma Castle,   [facility]  [phone]    Clinic Care Coordination Contact  Financial Resource Worker Screening    [facility] Benefits  Is patient requesting help applying for [facility] benefits?: Yes  Have you recently applied for any [facility] benefits?: No  How many people in your household?: 3  Do you buy/eat food together?: No  What is the monthly gross income for the household (wages, social security, workers comp, and pension)? : 0    [place]:  [place] No  Is this an insurance renewal?: No  Is this a new insurance application request?: Yes  Have you recently applied for insurance?: No  How many people in your household? : 3  Do you file taxes?: Yes  How many dependents do you claim?: 0  What is the monthly gross income for the household (wages, social security,  workers comp, and pension)? : 0    Any other information for the FRW?: he lives with two roommates.  He may have applied on [employer] to see he is can get MA.  has no income since July when worker's comp quit paying. help with Gutierrez, MA, Snap please    Care Coordination team will tell patient:   Thank you for answering all the questions, based on screening questions, our Financial Resource Worker will reach out to you with additional questions and next steps.    Paloma Castle,   [facility]  [phone]

## 2023-01-03 ENCOUNTER — PATIENT OUTREACH (OUTPATIENT)
Dept: CARE COORDINATION | Facility: CLINIC | Age: 33
End: 2023-01-03

## 2023-01-03 NOTE — PROGRESS NOTES
Clinic Care Coordination Contact  Program:  County:  [facility] Case #:  [facility] Worker:   Michael #:   Subscriber #:   Renewal:  Date Applied:     FR Outreach:   1/3/2023 FRW called and completed online application for SNAP and CASH but patient knows he might not get cash due to he is getting his VA benefits of 170.00 FRW to call later this week due to he just submitted his online application for [employer]. And he is not meghana what the outcome of that was.   SNAP/CASH Application Screenin. Have you had [facility] benefits before? no  2. How many people in the household, do you eat/buy food together? 1  3. What is your monthly income (include all tax members)? 170.00   4. Do you have a bank account?   5. Do you have any utility bills (electricity, rent, mortgage, phone, insurance, medical bills, etc.)? Yes   6. Do you have social security cards and/or green cards? Yes   Health Insurance Screening: MICHAEL   1. Do you currently have health insurance, did you receive a renewal? No   2. If you applied through [employer], do you know your username/password? Yes   3. How many people in the household?  4. Do you file taxes, who do you file with?   5. What is your monthly income (include all tax members)?  6. Do you have access to insurance through an employer (if yes, need EIN)?  7. Do you have social security cards and/or green cards?      Health Insurance:      Referral/Screening:  [facility] Screening    Row Name [phone]       [facility] Benefits   Is patient requesting help applying for [facility] benefits? Yes       Have you recently applied for any [facility] benefits? No       How many people in your household? 3       Do you buy/eat food together? No       What is the monthly gross income for the household (wages, social security, workers comp, and pension)?  0       [place]:   [place]? No       Is this an insurance renewal? No       Is this a new insurance application request? Yes       Have you recently  applied for insurance? No       How many people in your household?  3       Do you file taxes? Yes       How many dependents do you claim? 0       What is the monthly gross income for the household (wages, social security, workers comp, and pension)?  0       OTHER   Is this a cathy care application? No       Any other information for the FRW? he lives with two roommates.  He may have applied on [employer] to see he is can get MA.  has no income since July when worker's comp quit paying. help with Gutierrez, MA, Snap please

## 2023-01-05 ENCOUNTER — PATIENT OUTREACH (OUTPATIENT)
Dept: CARE COORDINATION | Facility: CLINIC | Age: 33
End: 2023-01-05

## 2023-01-05 NOTE — PROGRESS NOTES
Clinic Care Coordination Contact  Program:  County:  [facility] Case #:  [facility] Worker:   Michael #:   Subscriber #:   Renewal:  Date Applied:     FRW Outreach:   2022 FRW called the ARC line with the patient and he has an open case from  we had to close in order for the new application to be processed FRW will follow up in 3-4 weeks.    1/3/2023 FRW called and completed online application for SNAP and CASH but patient knows he might not get cash due to he is getting his [place] benefits of 170.00 FRW to call later this week due to he just submitted his online application for [employer]. And he is not meghana what the outcome of that was.   SNAP/CASH Application Screenin. Have you had [facility] benefits before? no  2. How many people in the household, do you eat/buy food together? 1  3. What is your monthly income (include all tax members)? 170.00   4. Do you have a bank account?   5. Do you have any utility bills (electricity, rent, mortgage, phone, insurance, medical bills, etc.)? Yes   6. Do you have social security cards and/or green cards? Yes   Health Insurance Screening: MICHAEL   1. Do you currently have health insurance, did you receive a renewal? No   2. If you applied through [employer], do you know your username/password? Yes   3. How many people in the household?  4. Do you file taxes, who do you file with?   5. What is your monthly income (include all tax members)?  6. Do you have access to insurance through an employer (if yes, need EIN)?  7. Do you have social security cards and/or green cards?      Health Insurance:      Referral/Screening:  FR Screening    Row Name [phone]       [facility] Benefits   Is patient requesting help applying for [facility] benefits? Yes       Have you recently applied for any [facility] benefits? No       How many people in your household? 3       Do you buy/eat food together? No       What is the monthly gross income for the household (wages, social security, workers comp, and pension)?   0       Ins[place]:   [place]? No       Is this an insurance renewal? No       Is this a new insurance application request? Yes       Have you recently applied for insurance? No       How many people in your household?  3       Do you file taxes? Yes       How many dependents do you claim? 0       What is the monthly gross income for the household (wages, social security, workers comp, and pension)?  0       OTHER   Is this a cathy care application? No       Any other information for the FRW? he lives with two roommates.  He may have applied on [employer] to see he is can get MA.  has no income since July when worker's comp quit paying. help with Gutierrez, MA, Snap please

## 2023-01-05 NOTE — PROGRESS NOTES
Answers for HPI/ROS submitted by the patient on 1/6/2023  STEPHANI 7 TOTAL SCORE: 21      Pain Clinic New Patient Consult Note:    Referring Provider: Chad   Primary care provider: Enrique Rubio.    Victor Manuel Saavedra is a 32 year old y.o. old male who presents to the pain clinic with chronic L foot pain, referred out of concern for possible CRPS.    HPI:  The patient had a work injury involving a fall and crush injury of his left foot 7 months ago. Since then, he has had negative imaging of his foot including an MRI and has not been recommending any surgical intervention. His pain peaked at about 8/10, improved to 4-5/10, but then in November had an flair up of pain when he was stepping out of the shower where is pain since has been worse. He has intermittent swelling in his L foot, in addition to redness. He also reports severe pain to light touch, but denies any change in ROM or ankle or toe strength. He does say that he no longer walks and that he has been bed-bound since his injury. He also says that he is going to PT but no longer does certain therapies because they induce pain. Pain is sharp and throbbing that is exacerbated with activity. He denies changes in hair or nail growth on affected foot. He has been using the diclofenac cream on his foot, in addition to 1 g Tylenol and 600 mg ibuprofen as needed for his pain, but feels that the medications provide minimal relief. He has not tried acupuncture, desensitization therapy, TENS, or any other medications other than acetaminophen and NSAIDs.    [place] Criteria for diagnosis of Complex Regional Pain Syndrome:    Continuing pain disproportionate to inciting event: positive for L foot  Must report one symptom (subjective) in two of the following four categories:   Sensory (hyperalgesia or allodynia): positive for allodynia   Vasomotor (temperature asymmetry, skin color changes, skin color asymmetry): positive for warmth and redness   Pseudomotor/edema (edema,  sweating or sweating asymmetry): positive for edema   Motor/trophic (decreased range of motion, motor dysfunction, trophic changes in skin, hair or nails): negative  Must display (objective) one sign in two or more of the following four categories:   Sensory (hyperalgesia or allodynia): negative   Vasomotor (temperature asymmetry, skin color changes, skin color asymmetry): negative   Pseudomotor/edema (edema, sweating or sweating asymmetry): negative   Motor/trophic (decreased range of motion, motor dysfunction, trophic changes in skin, hair or nails): negative  Patient does not meet criteria for Complex Regional Pain Syndrome    Of note, the patient had poor participation in physical exam, which limits exam diagnostic evaluation.    Patient Supplied Answers To the  Pain Questionnaire  [facility] -  Patient Entered Questionnaire/Answers 1/6/2023   What number best describes your pain right now:  0 = No pain  to  10 = Worst pain imaginable 7   How would you describe the pain? burning, cramping, sharp, cutting, dull, aching, throbbing   Which of the following worsen your pain? lying down, standing, sitting, walking, exercise, relaxation, urination, bowel movements   Which of the following improve or reduce your pain? lying down, nothing relieves the pain   What number best describes your average pain for the past week:  0 = No pain  to  10 = Worst pain imaginable 6   What number best describes your LOWEST pain in past 24 hours:  0 = No pain  to  10 = Worst pain imaginable 5   What number best describes your WORST pain in past 24 hours:  0 = No pain  to  10 = Worst pain imaginable 7   When is your pain worst? Constant       Pain treatments:  Herbal medicines: none  Physical therapy: currently, but is avoiding therapies that cause him pain  Chiropractor: none  Pain physician: none  Surgery: none  Biofeedback: no  Acupuncture: no    Current pain medications:  - Diclofenac gel  - Acetaminophen 1g PRN  - Ibuprofen 600mg  PRN    Tests/Imaging reviewed with the patient:  11/10/2022 XR left foot  Impression:  1. No acute osseous abnormality.  2. No substantial degenerative change.    8/2/2022 Left Foot MRI  IMPRESSION:  1.  Mild marrow edema involving the distal phalanges of the first through fourth toes. This is nonspecific, acute traumatic or repetitive contusions most likely.  2.  No additional abnormality.    Significant Medical History:   Past Medical History:   Diagnosis Date     Infection due to 2019 novel coronavirus 10/2022     Nicotine dependence 11/30/2022        Past Surgical History:  Past Surgical History:   Procedure Laterality Date     HERNIA REPAIR, INGUINAL RT/LT Right     [employer] injury        Family History:  No family history on file.       Social History:  Social History     Socioeconomic History     Marital status: Single     Spouse name: Not on file     Number of children: Not on file     Years of education: Not on file     Highest education level: Not on file   Occupational History     Not on file   Tobacco Use     Smoking status: Former     Types: Cigarettes, Dip, chew, snus or snuff     Smokeless tobacco: Never   Vaping Use     Vaping Use: Every day   Substance and Sexual Activity     Alcohol use: Yes     Drug use: Never     Sexual activity: Not on file   Other Topics Concern     Not on file   Social History Narrative    Works as cell tower , working half time.  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  Brother in [place].  Sister in [place], no contact with father.  Was in the [employer] for two years.  Injured there.      Social Determinants of Health     Financial Resource Strain: High Risk     Difficulty of Paying Living Expenses: Hard   Food Insecurity: Food Insecurity Present     Worried About Running Out of Food in the Last Year: Often true     Ran Out of Food in the Last Year: Often true   Transportation Needs: Unmet Transportation Needs     Lack of Transportation (Medical): Patient refused     Lack of Transportation  (Non-Medical): Yes   Physical Activity: Inactive     Days of Exercise per Week: 0 days     Minutes of Exercise per Session: 0 min   Stress: Stress Concern Present     Feeling of Stress : Very much   Social Connections: Socially Isolated     Frequency of Communication with Friends and Family: Once a week     Frequency of Social Gatherings with Friends and Family: Never     Attends Yazdanism Services: Never     Active Member of Clubs or Organizations: No     Attends Club or Organization Meetings: Not on file     Marital Status: Never    Intimate Partner Violence: Not on file   Housing Stability: Low Risk      Unable to Pay for Housing in the Last Year: No     Number of Places Lived in the Last Year: 1     Unstable Housing in the Last Year: No     Social History     Social History Narrative    Works as cell tower , working half time.  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  Brother in [place].  Sister in [place], no contact with father.  Was in the [employer] for two years.  Injured there.         Allergies:  No Known Allergies    Current Medications:   Current Outpatient Medications   Medication Sig Dispense Refill     cholecalciferol, vitamin D3, (CHOLECALCIFEROL) 400 unit Tab Take 400 Units by mouth daily       diclofenac (VOLTAREN) 1 % topical gel Apply 2 g topically 4 times daily 150 g 1     hydrOXYzine (ATARAX) 25 MG tablet Take 1-2 tablets (25-50 mg) by mouth every 6 hours as needed for anxiety 30 tablet 0     multivitamin (MULTIVITAMIN) per tablet Take 1 tablet by mouth daily          Current Pain Medications:  Medications related to Pain Management (From now, onward)    None           Answers for HPI/ROS submitted by the patient on 1/6/2023  STEPHANI 7 TOTAL SCORE: 21    Past Pain Medications:  Ibuprofen  Acetaminophen  Diclofenac    Blood thinner:  None    Work History:    Current work status: not addressed, but currently bed-bound    Psychosocial History:     History of treatment for behavioral disorder: No  History of  "suicidal ideation or suicidal attempt: No    Review of Systems:  Review of Systems   All other systems reviewed and are negative.      Physical Exam:     Vitals:    01/06/23 1021   BP: 110/70   BP Location: Right arm   Patient Position: Chair   Cuff Size: Adult Regular   Pulse: 72   SpO2: 96%   Weight: 70.8 kg (156 lb)   Height: 1.905 m (6' 3\")       General Appearance: No distress, seated comfortably  Mood: Euthymic  HE ENT: Non constricted pupils  Respiratory: Non labored breathing  CVS: Regular rate and rhythm  GI: Soft, non distended, no TTP  Skin: No rashes over exposed skin  MS: Noted birthmark lesion on dorsal surface of L foot. ROM and strength testing in both feet limited by poor patient participation, but appears grossly normal. There is some pain to light touch on medial surface of L foot.   Neuro: Moves all extremities spontaneously. Sensation grossly intact in both feet.   Gait: not assessed. Currently has boot on affected foot and does not ambulate at home.      Laboratory results:  No results for input(s): NA, POTASSIUM, CHLORIDE, CO2, ANIONGAP, GLC, BUN, CR, ENRIQUE in the last 35307 hours.    CBC RESULTS: No results for input(s): WBC, RBC, HGB, HCT, MCV, MCH, MCHC, RDW, PLT in the last 87591 hours.      Imaging:  No images are attached to the encounter.     ASSESSMENT AND PLAN:     Encounter Diagnosis:  Possible CRPS Type Vs other Neuropathic Pain    Victor Manuel Saavedra is a 32 year old y.o. old male who presents to the pain clinic with complaint of ongoing L foot pain 7 months after crush injury at work. He has multiple negative foot images, including an MRI, and has not been determined to be a surgical candidate. Since his injury, he states he has been bed bound and has had a declining number of activities he is able to complete. He is seeing PT but avoids stretches/exercises that cause him pain. His history is notable for allodynia, skin redness, and skin swelling at the site of his injury, but physical " exam is unremarkable. This could represent possible CRPS I, but fear avoidance and lack of activity is certainly a significant component of his ongoing pain. His pain could simply be a result of slow healing from his injury due to lack of therapies.     I have summarized the patient s past medical history, discussed their clinical findings and the potential differential diagnosis with the patient. Significant past medical history pertinent to the patient s current condition includes none.  The clinical findings reveal exam not obviously positive for CRPS. The differential diagnosis discussed with the patient are listed above. I have discussed anatomy and possible sources of the pain using models and/or pictures (diagrams). I have discussed multi- disciplinary pain management options withthe patient as pertaining to their case as detailed above. The pain management options we discussed included, but were not limited to the recommendations below.  I also discussed with patient the risks, benefits and alternatives to each pain management option.  All of the patient s questions and concerns were answered to the best of my ability.    RECOMMENDATIONS:     1. Medications: We are recommending the patient use OTC diclofenac and lidocaine creams as needed and to continue using OTC ibuprofen and acetaminophen as needed. No prescriptions provided today. Dosing, side effects, risks/benefits/alternatives were discussed with the patient in detail.    2. Procedure: We discussed a left lumbar sympathetic nerve block - he will contact our clinic if he would like to go through with this     I also discussed with the patient that the possible risks involved with interventional treatment included, but are not limited to, no pain control, worsened pain, stroke,seizure, spinal headache, allergic reactions, introduction of infection or bleeding which may lead to emergent spine surgery, nerve damage, paralysis oreven death.    3. Physical  therapy: I have refered the patient for outpatient desensitization therapy in addition to recommending ongoing physical therapy for stretching, strengthening. Discussed how movement will improve pain and recommended activity as tolerated and to complete all recommended PT activities, even if they induce pain that is manageable.    Follow up: after pain procedure or as needed    This patient was seen and discussed with the attending physician.    aTshi Huang MD  Anesthesiology Resident, CA-2    I saw and examined the patient with the Pain Fellow/Resident. I have reviewed and agree with the resident's note and plan of care and made changes and corrections directly to the body of the note.    TIME SPENT:  BY FELLOW/RESIDENT ALONE 15 MIN  BY MYSELF AND FELLOW/RESIDENT TOGETHER 15 MIN      Maricarmen Campbell MD  Pain Medicine, Department of Anesthesiology  , [facility]

## 2023-01-06 ENCOUNTER — OFFICE VISIT (OUTPATIENT)
Dept: ANESTHESIOLOGY | Facility: CLINIC | Age: 33
End: 2023-01-06
Attending: FAMILY MEDICINE
Payer: OTHER MISCELLANEOUS

## 2023-01-06 VITALS
SYSTOLIC BLOOD PRESSURE: 110 MMHG | WEIGHT: 156 LBS | HEART RATE: 72 BPM | DIASTOLIC BLOOD PRESSURE: 70 MMHG | OXYGEN SATURATION: 96 % | BODY MASS INDEX: 19.4 KG/M2 | HEIGHT: 75 IN

## 2023-01-06 DIAGNOSIS — G57.72 CAUSALGIA OF LEFT LOWER EXTREMITY: ICD-10-CM

## 2023-01-06 DIAGNOSIS — S97.82XS CRUSHING INJURY OF LEFT FOOT, SEQUELA: ICD-10-CM

## 2023-01-06 DIAGNOSIS — M79.672 LEFT FOOT PAIN: ICD-10-CM

## 2023-01-06 PROCEDURE — 99204 OFFICE O/P NEW MOD 45 MIN: CPT | Mod: GC | Performed by: ANESTHESIOLOGY

## 2023-01-06 ASSESSMENT — ANXIETY QUESTIONNAIRES
1. FEELING NERVOUS, ANXIOUS, OR ON EDGE: NEARLY EVERY DAY
GAD7 TOTAL SCORE: 21
6. BECOMING EASILY ANNOYED OR IRRITABLE: NEARLY EVERY DAY
8. IF YOU CHECKED OFF ANY PROBLEMS, HOW DIFFICULT HAVE THESE MADE IT FOR YOU TO DO YOUR WORK, TAKE CARE OF THINGS AT HOME, OR GET ALONG WITH OTHER PEOPLE?: EXTREMELY DIFFICULT
IF YOU CHECKED OFF ANY PROBLEMS ON THIS QUESTIONNAIRE, HOW DIFFICULT HAVE THESE PROBLEMS MADE IT FOR YOU TO DO YOUR WORK, TAKE CARE OF THINGS AT HOME, OR GET ALONG WITH OTHER PEOPLE: EXTREMELY DIFFICULT
2. NOT BEING ABLE TO STOP OR CONTROL WORRYING: NEARLY EVERY DAY
3. WORRYING TOO MUCH ABOUT DIFFERENT THINGS: NEARLY EVERY DAY
5. BEING SO RESTLESS THAT IT IS HARD TO SIT STILL: NEARLY EVERY DAY
7. FEELING AFRAID AS IF SOMETHING AWFUL MIGHT HAPPEN: NEARLY EVERY DAY
GAD7 TOTAL SCORE: 21
4. TROUBLE RELAXING: NEARLY EVERY DAY
7. FEELING AFRAID AS IF SOMETHING AWFUL MIGHT HAPPEN: NEARLY EVERY DAY

## 2023-01-06 ASSESSMENT — PAIN SCALES - PAIN ENJOYMENT GENERAL ACTIVITY SCALE (PEG)
AVG_PAIN_PASTWEEK: 5
INTERFERED_GENERAL_ACTIVITY: 10
AVG_PAIN_PASTWEEK: 5
INTERFERED_GENERAL_ACTIVITY: 10 - COMPLETELY INTERFERES
INTERFERED_ENJOYMENT_LIFE: 10
PEG_TOTALSCORE: 8.33
PEG_TOTALSCORE: 8.33
INTERFERED_ENJOYMENT_LIFE: 10 - COMPLETELY INTERFERES

## 2023-01-06 ASSESSMENT — PAIN SCALES - GENERAL: PAINLEVEL: SEVERE PAIN (7)

## 2023-01-06 NOTE — NURSING NOTE
RN read through the instructions with the patient for the recommended procedure: Left Lumbar sympathetic block  Patient verbalized understanding to holding appropriate medication per protocol and was agreeable to NPO policy and needing a .    Anticoagulant: None reported    Recommended Follow Up: Follow up after procedure or as needed.    Genesis Brock, MARGARITACC

## 2023-01-06 NOTE — LETTER
1/6/2023       RE: Victor Manuel Saavedra  [facility] [place] [address]     Dear Colleague,    Thank you for referring your patient, Victor Manuel Saavedra, to the [facility] CLINIC FOR COMPREHENSIVE PAIN MANAGEMENT [place] at [facility]. Please see a copy of my visit note below.    Answers for HPI/ROS submitted by the patient on 1/6/2023  STEPHANI 7 TOTAL SCORE: 21      Pain Clinic New Patient Consult Note:    Referring Provider: Chad   Primary care provider: Enrique Rubio.    Victor Manuel Saavedra is a 32 year old y.o. old male who presents to the pain clinic with chronic L foot pain, referred out of concern for possible CRPS.    HPI:  The patient had a work injury involving a fall and crush injury of his left foot 7 months ago. Since then, he has had negative imaging of his foot including an MRI and has not been recommending any surgical intervention. His pain peaked at about 8/10, improved to 4-5/10, but then in November had an flair up of pain when he was stepping out of the shower where is pain since has been worse. He has intermittent swelling in his L foot, in addition to redness. He also reports severe pain to light touch, but denies any change in ROM or ankle or toe strength. He does say that he no longer walks and that he has been bed-bound since his injury. He also says that he is going to PT but no longer does certain therapies because they induce pain. Pain is sharp and throbbing that is exacerbated with activity. He denies changes in hair or nail growth on affected foot. He has been using the diclofenac cream on his foot, in addition to 1 g Tylenol and 600 mg ibuprofen as needed for his pain, but feels that the medications provide minimal relief. He has not tried acupuncture, desensitization therapy, TENS, or any other medications other than acetaminophen and NSAIDs.    [place] Criteria for diagnosis of Complex Regional Pain Syndrome:    Continuing  pain disproportionate to inciting event: positive for L foot  Must report one symptom (subjective) in two of the following four categories:   Sensory (hyperalgesia or allodynia): positive for allodynia   Vasomotor (temperature asymmetry, skin color changes, skin color asymmetry): positive for warmth and redness   Pseudomotor/edema (edema, sweating or sweating asymmetry): positive for edema   Motor/trophic (decreased range of motion, motor dysfunction, trophic changes in skin, hair or nails): negative  Must display (objective) one sign in two or more of the following four categories:   Sensory (hyperalgesia or allodynia): negative   Vasomotor (temperature asymmetry, skin color changes, skin color asymmetry): negative   Pseudomotor/edema (edema, sweating or sweating asymmetry): negative   Motor/trophic (decreased range of motion, motor dysfunction, trophic changes in skin, hair or nails): negative  Patient does not meet criteria for Complex Regional Pain Syndrome    Of note, the patient had poor participation in physical exam, which limits exam diagnostic evaluation.    Patient Supplied Answers To the [facility] Pain Questionnaire  [facility] Pain -  Patient Entered Questionnaire/Answers 1/6/2023   What number best describes your pain right now:  0 = No pain  to  10 = Worst pain imaginable 7   How would you describe the pain? burning, cramping, sharp, cutting, dull, aching, throbbing   Which of the following worsen your pain? lying down, standing, sitting, walking, exercise, relaxation, urination, bowel movements   Which of the following improve or reduce your pain? lying down, nothing relieves the pain   What number best describes your average pain for the past week:  0 = No pain  to  10 = Worst pain imaginable 6   What number best describes your LOWEST pain in past 24 hours:  0 = No pain  to  10 = Worst pain imaginable 5   What number best describes your WORST pain in past 24 hours:  0 = No pain  to  10 = Worst pain imaginable 7    When is your pain worst? Constant       Pain treatments:  Herbal medicines: none  Physical therapy: currently, but is avoiding therapies that cause him pain  Chiropractor: none  Pain physician: none  Surgery: none  Biofeedback: no  Acupuncture: no    Current pain medications:  - Diclofenac gel  - Acetaminophen 1g PRN  - Ibuprofen 600mg PRN    Tests/Imaging reviewed with the patient:  11/10/2022 XR left foot  Impression:  1. No acute osseous abnormality.  2. No substantial degenerative change.    8/2/2022 Left Foot MRI  IMPRESSION:  1.  Mild marrow edema involving the distal phalanges of the first through fourth toes. This is nonspecific, acute traumatic or repetitive contusions most likely.  2.  No additional abnormality.    Significant Medical History:   Past Medical History:   Diagnosis Date     Infection due to 2019 novel coronavirus 10/2022     Nicotine dependence 11/30/2022        Past Surgical History:  Past Surgical History:   Procedure Laterality Date     HERNIA REPAIR, INGUINAL RT/LT Right     [employer] injury        Family History:  No family history on file.       Social History:  Social History     Socioeconomic History     Marital status: Single     Spouse name: Not on file     Number of children: Not on file     Years of education: Not on file     Highest education level: Not on file   Occupational History     Not on file   Tobacco Use     Smoking status: Former     Types: Cigarettes, Dip, chew, snus or snuff     Smokeless tobacco: Never   Vaping Use     Vaping Use: Every day   Substance and Sexual Activity     Alcohol use: Yes     Drug use: Never     Sexual activity: Not on file   Other Topics Concern     Not on file   Social History Narrative    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  Brother in [place].  Sister in [place], no contact with father.  Was in the [employer] for two years.  Injured there.      Social Determinants of Health     Financial Resource Strain: High Risk      Difficulty of Paying Living Expenses: Hard   Food Insecurity: Food Insecurity Present     Worried About Running Out of Food in the Last Year: Often true     Ran Out of Food in the Last Year: Often true   Transportation Needs: Unmet Transportation Needs     Lack of Transportation (Medical): Patient refused     Lack of Transportation (Non-Medical): Yes   Physical Activity: Inactive     Days of Exercise per Week: 0 days     Minutes of Exercise per Session: 0 min   Stress: Stress Concern Present     Feeling of Stress : Very much   Social Connections: Socially Isolated     Frequency of Communication with Friends and Family: Once a week     Frequency of Social Gatherings with Friends and Family: Never     Attends Pentec[employer]al Services: Never     Active Member of Clubs or Organizations: No     Attends Club or Organization Meetings: Not on file     Marital Status: Never    Intimate Partner Violence: Not on file   Housing Stability: Low Risk      Unable to Pay for Housing in the Last Year: No     Number of Places Lived in the Last Year: 1     Unstable Housing in the Last Year: No     Social History     Social History Narrative    Works as cell tower , working half time.  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  Brother in [place].  Sister in [place], no contact with father.  Was in the [employer] for two years.  Injured there.         Allergies:  No Known Allergies    Current Medications:   Current Outpatient Medications   Medication Sig Dispense Refill     cholecalciferol, vitamin D3, (CHOLECALCIFEROL) 400 unit Tab Take 400 Units by mouth daily       diclofenac (VOLTAREN) 1 % topical gel Apply 2 g topically 4 times daily 150 g 1     hydrOXYzine (ATARAX) 25 MG tablet Take 1-2 tablets (25-50 mg) by mouth every 6 hours as needed for anxiety 30 tablet 0     multivitamin (MULTIVITAMIN) per tablet Take 1 tablet by mouth daily          Current Pain Medications:  Medications related to Pain Management (From now, onward)    None        "    Answers for HPI/ROS submitted by the patient on 1/6/2023  STEPHANI 7 TOTAL SCORE: 21    Past Pain Medications:  Ibuprofen  Acetaminophen  Diclofenac    Blood thinner:  None    Work History:    Current work status: not addressed, but currently bed-bound    Psychosocial History:     History of treatment for behavioral disorder: No  History of suicidal ideation or suicidal attempt: No    Review of Systems:  Review of Systems   All other systems reviewed and are negative.      Physical Exam:     Vitals:    01/06/23 1021   BP: 110/70   BP Location: Right arm   Patient Position: Chair   Cuff Size: Adult Regular   Pulse: 72   SpO2: 96%   Weight: 70.8 kg (156 lb)   Height: 1.905 m (6' 3\")       General Appearance: No distress, seated comfortably  Mood: Euthymic  HE ENT: Non constricted pupils  Respiratory: Non labored breathing  CVS: Regular rate and rhythm  GI: Soft, non distended, no TTP  Skin: No rashes over exposed skin  MS: Noted birthmark lesion on dorsal surface of L foot. ROM and strength testing in both feet limited by poor patient participation, but appears grossly normal. There is some pain to light touch on medial surface of L foot.   Neuro: Moves all extremities spontaneously. Sensation grossly intact in both feet.   Gait: not assessed. Currently has boot on affected foot and does not ambulate at home.      Laboratory results:  No results for input(s): NA, POTASSIUM, CHLORIDE, CO2, ANIONGAP, GLC, BUN, CR, ENRIQUE in the last 54742 hours.    CBC RESULTS: No results for input(s): WBC, RBC, HGB, HCT, MCV, MCH, MCHC, RDW, PLT in the last 26028 hours.      Imaging:  No images are attached to the encounter.     ASSESSMENT AND PLAN:     Encounter Diagnosis:  Possible CRPS Type Vs other Neuropathic Pain    Victor Manuel Saavedra is a 32 year old y.o. old male who presents to the pain clinic with complaint of ongoing L foot pain 7 months after crush injury at work. He has multiple negative foot images, including an MRI, and has not " been determined to be a surgical candidate. Since his injury, he states he has been bed bound and has had a declining number of activities he is able to complete. He is seeing PT but avoids stretches/exercises that cause him pain. His history is notable for allodynia, skin redness, and skin swelling at the site of his injury, but physical exam is unremarkable. This could represent possible CRPS I, but fear avoidance and lack of activity is certainly a significant component of his ongoing pain. His pain could simply be a result of slow healing from his injury due to lack of therapies.     I have summarized the patient s past medical history, discussed their clinical findings and the potential differential diagnosis with the patient. Significant past medical history pertinent to the patient s current condition includes none.  The clinical findings reveal exam not obviously positive for CRPS. The differential diagnosis discussed with the patient are listed above. I have discussed anatomy and possible sources of the pain using models and/or pictures (diagrams). I have discussed multi- disciplinary pain management options withthe patient as pertaining to their case as detailed above. The pain management options we discussed included, but were not limited to the recommendations below.  I also discussed with patient the risks, benefits and alternatives to each pain management option.  All of the patient s questions and concerns were answered to the best of my ability.    RECOMMENDATIONS:     1. Medications: We are recommending the patient use OTC diclofenac and lidocaine creams as needed and to continue using OTC ibuprofen and acetaminophen as needed. No prescriptions provided today. Dosing, side effects, risks/benefits/alternatives were discussed with the patient in detail.    2. Procedure: We discussed a left lumbar sympathetic nerve block - he will contact our clinic if he would like to go through with this     I also  discussed with the patient that the possible risks involved with interventional treatment included, but are not limited to, no pain control, worsened pain, stroke,seizure, spinal headache, allergic reactions, introduction of infection or bleeding which may lead to emergent spine surgery, nerve damage, paralysis oreven death.    3. Physical therapy: I have refered the patient for outpatient desensitization therapy in addition to recommending ongoing physical therapy for stretching, strengthening. Discussed how movement will improve pain and recommended activity as tolerated and to complete all recommended PT activities, even if they induce pain that is manageable.    Follow up: after pain procedure or as needed    This patient was seen and discussed with the attending physician.    Tashi Huang MD  Anesthesiology Resident, CA-2        Sincerely,    Maricarmen Campbell MD

## 2023-01-06 NOTE — NURSING NOTE
Patient presents with:  Consult: Consult Left Foot pain      Severe Pain (7)         What medications are you using for pain? Ibuprofen, Tyleno    (New patients only) Have you been seen by another pain clinic/ provider? no    (Return Patients only) What refills are you needing today? No    Expectation not sure

## 2023-01-06 NOTE — PATIENT INSTRUCTIONS
Medications:    Diclofenac gel as needed    For refills of opioid medications - You MUST call (Or MyChart) the clinic DIRECTLY and at least 7 days before you run out of your medication.    Please provide the clinic with a minium of 1 week notice, on all prescription refills.     Referrals:    Pain Physical Therapy Referral placed-   Call [phone] to schedule your appointment.        Procedures:    Call to schedule your procedure: [phone] option #2  Left Lumbar sympathetic block    Your pre-procedure instructions are below, please call our clinic if you have any questions.      Recommended Follow up:      Follow up after procedure or as needed.     To speak with a nurse, schedule/reschedule/cancel a clinic appointment, or request a medication refill call: [phone].    You can also reach us by [employer]: [email or web address]        Procedure Information related to COVID-19     Please call [phone] option #2 to schedule, reschedule, or cancel your procedure appointment.   Phones are answered Monday - Friday from 08:00 - 4:30pm.  Leave a voicemail with your name, birth date, and phone number if no one is available to take your call.        You no longer need to test for COVID- 19 prior to your procedure/surgery, unless your physician specifically requests that you test. If you experience COVID symptoms or have tested positive for COVID-19 within 14 days of your scheduled surgery or procedure, please update our office right away and your procedure may have to be postponed.       The procedure center staff will call you several days before the procedure to review important information that you will need to know for the day of the procedure.     Please contact the clinic if you have further questions about this information [phone].        Information related to Scheduling and Pre-Procedure Instructions:    If you must reschedule your procedure more than two times, you must follow up in  clinic before rescheduling again.      Preparing for your procedure    CAUTION - FAILURE TO FOLLOW THESE PRE-PROCEDURE INSTRUCTIONS WILL RESULT IN YOUR PROCEDURE BEING RESCHEDULED.    Your Procedure: Left Lumbar sympathetic block            You must have a  take you home after your procedure. Transportation by taxi or para-transit is okay as long as you have a responsible adult accompany you. You must provide your 's full name and contact number at time of check in.     Fasting Protocol Please have nothing to eat or drink 1 hour prior to arrival.     Medications If you take any medications, DO NOT STOP. Take your medications as usual the day of your procedure with a sip of water AT LEAST 2 HOURS PRIOR TO ARRIVAL.    Antibiotics If you are currently taking antibiotics, you must complete the entire dose 7 days prior to your scheduled procedure. You must be clear of any signs or symptoms of infection. If you begin antibiotics, please contact our clinic for instructions.     Fever, Chills, or Rash If you experience a fever of higher than 100 degrees, chills, rash, or open wounds during the one week before your procedure, please call the clinic to see if you may proceed with your procedure.      Medication Hold List  **Patients under Cardiology/Neurology care should consult their provider prior to the pain procedure to verify pre-procedure medication instructions. The information below contains general guidelines.**        Blood Thinners If you are taking daily ASPIRIN, PLAVIX, OR OTHER BLOOD THINNERS SUCH AS COUMADIN/WARFARIN, we will need your prescribing doctor to sign a release permitting you to stop these medications. Once approved by your prescribing doctor - STOP ALL BLOOD THINNERS BASED ON THE TIME TABLE BELOW PRIOR TO YOUR PROCEDURE. If you have been instructed to stop WARFARIN(COUMADIN), you must have an INR lab drawn the day before your procedure. . Your INR must be within normal limits  before we can perform your injection. MEDICATIONS CAN BE RESTARTED AFTER YOUR PROCEDURE.    14 DAY HOLD  Ticlid (ticlopidine)    10 DAY HOLD  Effient (Prasugel)    3 DAY HOLD  Xarelto (rivaroxaban) 7 DAY HOLD  Anacin, Bufferin, Ecotrin, Excedrin, Aggrenox (Aspirin)  Brilinta (ticagrelor)  Coumadin (Warfarin)  Pradexa (Dabigatran)  Elmiron (Pentosan)  Plavix (Clopidogrel Bisulfate)  Pletal (Cilostazol)    24 HOUR HOLD  Lovenox (enoxaparin)  Agrylin (Anagrelide)        Non-steroidal Anti-inflammatories (NSAIDs) DO NOT TAKE any non-steroidal anti-inflammatory medications (NSAIDs) listed on the table below. MEDICATIONS CAN BE RESTARTED AFTER YOUR PROCEDURE. Celebrex is OK to take and does not need to be discontinued.     Medications to stop:  3 DAY HOLD  Advil, Motrin (Ibuprofen)  Arthrotec (diciofenac sodium/misoprostol)  Clinoril (Sulindac)  Indocin (Indomethacin)  Lodine (Etodolac)  Toradol (Ketorolac)  Vicoprofen (Hydrocodone and Ibuprofen)  Voltaren (Diclotenac)    14 DAY HOLD  Daypro (Oxaprozin)  Feldene (Piroxicam)   7 DAY HOLD  Aleve (Naproxen sodium)  Darvon compound (contains aspirin)  Naprosyn (Naproxen)  Norgesic Forte (contains aspirin)  Mobic (Meloxicam)  Oruvall (Ketoprofen)  Percodan (contains aspirin)  Relafen (Nabumetone)  Salsalate  Trilisate  Vitamin E (more than 400 mg per day)  Any medication containing aspirin                To speak with a nurse, schedule/reschedule/cancel a clinic appointment, or request a medication refill call: [phone]    You can also reach us by [email or web address]: [email or web address]

## 2023-01-10 ENCOUNTER — OFFICE VISIT (OUTPATIENT)
Dept: NEUROLOGY | Facility: CLINIC | Age: 33
End: 2023-01-10
Payer: OTHER MISCELLANEOUS

## 2023-01-10 DIAGNOSIS — S93.522A TURF TOE OF LEFT FOOT: ICD-10-CM

## 2023-01-10 DIAGNOSIS — S97.82XS CRUSHING INJURY OF LEFT FOOT, SEQUELA: ICD-10-CM

## 2023-01-10 PROCEDURE — 95910 NRV CNDJ TEST 7-8 STUDIES: CPT | Performed by: PSYCHIATRY & NEUROLOGY

## 2023-01-10 PROCEDURE — 95885 MUSC TST DONE W/NERV TST LIM: CPT | Performed by: PSYCHIATRY & NEUROLOGY

## 2023-01-10 NOTE — PROGRESS NOTES
[facility]  Electrodiagnostic Laboratory                 Department of Neurology                                                                                                         Test Date:  1/10/2023    Patient: Victor Manuel Saavedra : 1990 Physician: Jonel Mario MD   Sex: Male AGE: 32 year Ref Phys: Gallito Bonilla MD   ID#: [id number]   Technician: Eric Mendez     Clinical Information:    32 year old man with chronic pain at the dorsum of the left foot following a crush injury. Query left lower extremity focal mononeuropathy affecting the peroneal or tibial nerve branches    Techniques:    Motor and sensory conduction studies were done with surface recording electrodes. EMG was done with a concentric needle electrode.     Results:    Left fibular (EDB), and tibial (AH) motor NCSs were normal. Bilateral sural and superficial fibular antidromic sensory NCSs, and bilateral medial plantar orthodromic mixed NCSs were normal without major side-to-side differences.  Needle EMG of the left TA, medial gastrocnemius, and EHL was normal. Further needle EMG of foot muscles was deferred due to patient's discomfort.     Interpretation:    Normal study. No electrodiagnostic evidence of a distal left fibular or tibial mononeuropathy to explain the patient's symptoms.     ___________________________  Jonel Mario MD        Nerve Conduction Studies  Motor Sites      Latency Amplitude Neg. Amp Diff Segment Distance Velocity Neg. Dur Neg Area Diff Temperature Comment   Site (ms) Norm (mV) Norm %  cm m/s Norm ms %  C    Left Fibular (EDB) Motor   Ankle 4.7  < 6.0 5.6  > 2.5  Ankle-EDB 8   5.1  33.2    Bel Fib Head 12.0 - 5.1 - -8.9 Bel Fib Head-Ankle 36.5 50  > 38 5.8 0.62 33.2    Pop Fossa 13.2 - 5.1 - 0 Pop Fossa-Bel Fib Head 6.2 52  > 38 6.0 2.5 33.2    Left Tibial (AHB) Motor   Ankle 3.8  < 6.5 11.9  > 4.4  Ankle-AHB 8   7.0  32.5    Knee 12.2 - 9.0 - -24.4  Knee-Ankle 39 46  > 38 7.6 -5.1 32      Sensory Sites      Onset Lat Peak Lat Amp (O-P) Amp (P-P) Segment Distance Velocity Temperature Comment   Site ms ms  V Norm  V  cm m/s Norm  C    Left Medial Plantar (Mixed) Sensory   Med Sole-Med Mall 2.4 3.1 13 - 22 Med Sole-Med Mall - - - 31.3    Right Medial Plantar (Mixed) Sensory   Med Sole-Med Mall 2.6 3.3 10 - 17 Med Sole-Med Mall - - - 31.5    Left Superficial Fibular Sensory   14 cm-Ankle 2.4 3.2 12  > 3 16 14 cm-Ankle 12.5 52  > 38 31.2    Right Superficial Fibular Sensory   14 cm-Ankle 2.8 3.6 17  > 3 17 14 cm-Ankle 12.5 45  > 38 31    Left Sural Sensory   Calf-Lat Mall 2.8 3.5 22  > 5 23 Calf-Lat Mall 14 50  > 38 31.3    Right Sural Sensory   Calf-Lat Mall 2.5 3.4 20  > 5 24 Calf-Lat Mall 14 56  > 38 31        Electromyography     Side Muscle Ins Act Fibs/PSW Fasc HF Amp Dur Poly Recrt Int Pat   Left Tib Anterior Nml None Nml 0 Nml Nml 0 Nml Nml   Left Gastroc MH Nml None Nml 0 Nml Nml 0 Nml Nml   Left Ext Ferrara Long Nml None Nml 0 Nml Nml 0 Nml Nml         NCS Waveforms:    Motor         Sensory                      Ultrasound Images:

## 2023-01-10 NOTE — LETTER
1/10/2023       RE: Victor Manuel Saavedra  [facility] [place] [address]     Dear Colleague,    Thank you for referring your patient, Victor Manuel Saavedra, to the [facility] at [facility]. Please see a copy of my visit note below.                        [facility]  Electrodiagnostic Laboratory                 Department of Neurology                                                                                                         Test Date:  1/10/2023    Patient: Victor Manuel Saavedra : 1990 Physician: Jonel Mario MD   Sex: Male AGE: 32 year Ref Phys: Gallito Bonilla MD   ID#: [id number]   Technician: Eric Mendez     Clinical Information:    32 year old man with chronic pain at the dorsum of the left foot following a crush injury. Query left lower extremity focal mononeuropathy affecting the peroneal or tibial nerve branches    Techniques:    Motor and sensory conduction studies were done with surface recording electrodes. EMG was done with a concentric needle electrode.     Results:    Left fibular (EDB), and tibial (AH) motor NCSs were normal. Bilateral sural and superficial fibular antidromic sensory NCSs, and bilateral medial plantar orthodromic mixed NCSs were normal without major side-to-side differences.  Needle EMG of the left TA, medial gastrocnemius, and EHL was normal. Further needle EMG of foot muscles was deferred due to patient's discomfort.     Interpretation:    Normal study. No electrodiagnostic evidence of a distal left fibular or tibial mononeuropathy to explain the patient's symptoms.     ___________________________  Jonel Mario MD        Nerve Conduction Studies  Motor Sites      Latency Amplitude Neg. Amp Diff Segment Distance Velocity Neg. Dur Neg Area Diff Temperature Comment   Site (ms) Norm (mV) Norm %  cm m/s Norm ms %  C    Left Fibular (EDB) Motor   Ankle 4.7  < 6.0  5.6  > 2.5  Ankle-EDB 8   5.1  33.2    Bel Fib Head 12.0 - 5.1 - -8.9 Bel Fib Head-Ankle 36.5 50  > 38 5.8 0.62 33.2    Pop Fossa 13.2 - 5.1 - 0 Pop Fossa-Bel Fib Head 6.2 52  > 38 6.0 2.5 33.2    Left Tibial (AHB) Motor   Ankle 3.8  < 6.5 11.9  > 4.4  Ankle-AHB 8   7.0  32.5    Knee 12.2 - 9.0 - -24.4 Knee-Ankle 39 46  > 38 7.6 -5.1 32      Sensory Sites      Onset Lat Peak Lat Amp (O-P) Amp (P-P) Segment Distance Velocity Temperature Comment   Site ms ms  V Norm  V  cm m/s Norm  C    Left Medial Plantar (Mixed) Sensory   Med Sole-Med Mall 2.4 3.1 13 - 22 Med Sole-Med Mall - - - 31.3    Right Medial Plantar (Mixed) Sensory   Med Sole-Med Mall 2.6 3.3 10 - 17 Med Sole-Med Mall - - - 31.5    Left Superficial Fibular Sensory   14 cm-Ankle 2.4 3.2 12  > 3 16 14 cm-Ankle 12.5 52  > 38 31.2    Right Superficial Fibular Sensory   14 cm-Ankle 2.8 3.6 17  > 3 17 14 cm-Ankle 12.5 45  > 38 31    Left Sural Sensory   Calf-Lat Mall 2.8 3.5 22  > 5 23 Calf-Lat Mall 14 50  > 38 31.3    Right Sural Sensory   Calf-Lat Mall 2.5 3.4 20  > 5 24 Calf-Lat Mall 14 56  > 38 31        Electromyography     Side Muscle Ins Act Fibs/PSW Fasc HF Amp Dur Poly Recrt Int Pat   Left Tib Anterior Nml None Nml 0 Nml Nml 0 Nml Nml   Left Gastroc MH Nml None Nml 0 Nml Nml 0 Nml Nml   Left Ext Ferrara Long Nml None Nml 0 Nml Nml 0 Nml Nml         NCS Waveforms:    Motor         Sensory                      Ultrasound Images:            Again, thank you for allowing me to participate in the care of your patient.      Sincerely,    Jonel Mario MD

## 2023-01-12 ENCOUNTER — PATIENT OUTREACH (OUTPATIENT)
Dept: CARE COORDINATION | Facility: CLINIC | Age: 33
End: 2023-01-12

## 2023-01-12 NOTE — PROGRESS NOTES
Clinic Care Coordination Contact    Follow Up Progress Note      Assessment: Call back from patient.  He just got off the phone with the crisis line. He talked to them for an hour to help him feel better.  Reviewed his recent neurology and pain clinic appts.  He is suspicious that the block that was recommended by the pain clinic would help as it does not resolve the injury, just would cover up the pain.  Discussed that he may need pain relief in order to heal and he doubts that having pain relief would accomplish what he needs.  He doesn't think he will schedule the block.  He does not have any follow up appts at the pain clinic this time.  Feeling discouraged that Dr. Bonilla has signed off and that his only medical provider to handle his foot injury is the pain clinic.      He feels that medical providers do not believe him that he has pain from his injury.  He is frustrated that worker's comp wants to settle and be done with him.  His  is also discussing accepting a payment so he can move on with his life. He feels that any payment he gets will not help him long term as he doesn't know when he will be able to have a normal life.      He is working with [employer].  Discussed that when he gets insurance, he can proceed with mental health supports and eating disorder clinic, and testing for ADHD.      Care Gaps:    Health Maintenance Due   Topic Date Due     YEARLY PREVENTIVE VISIT  Never done     ADVANCE CARE PLANNING  Never done     DEPRESSION ACTION PLAN  Never done     HIV SCREENING  Never done     HEPATITIS C SCREENING  Never done     COVID-19 Vaccine (3 - Booster for Moderna series) 05/21/2022     INFLUENZA VACCINE (1) 09/01/2022       Declined due to not having insurance.      Care Plans  Care Plan: Financial Wellbeing     Problem: Patient expresses financial resource strain     Long-Range Goal: I will apply for county benefits.     Start Date: 12/29/2022 Expected End Date: 5/1/2023     This Visit's Progress: 10%    Priority: High    Note:     Barriers: stressed, in pain.   Strengths: can apply for benefits.  Patient expressed understanding of goal: yes  Action steps to achieve this goal:  1. I will work with FRW to help me apply for [facility] programs.  2. I will complete any paper work.  3. I will report progress towards this goal at scheduled outreach telephone calls from the CCC team.                        Care Plan: Mental Health     Problem: Mood/Psychosocial Concerns     Goal: Establish Mental Health Support     Start Date: 12/29/2022 Expected End Date: 5/1/2023    This Visit's Progress: 10%    Priority: High    Note:     Barriers: doesn't have insurance right now.  Strengths: would like to have support.   Patient expressed understanding of goal: yes  Action steps to achieve this goal:  1. I will apply for insurance.   2. I will call [facility] or crisis lines for support.   3. I will report progress towards this goal at scheduled outreach telephone calls from the CCC team.                            Intervention/Education provided during outreach: support.      Outreach Frequency: monthly      Plan:   Will contact patient in 2-3 weeks.     Contact  [facility]/Voicemail    Referral Source: Care Team  Clinical Data:  Outreach  Outreach attempted x 1.  Left message on voicemail with call back information and requested return call.  Plan:   will try to reach patient again in 10 business days.  Paloma Castle,   [facility]  [phone]

## 2023-01-25 NOTE — PROGRESS NOTES
[facility] Rehabilitation Service    Outpatient Physical Therapy Discharge Note  Patient: Victor Manuel Saavedra  : 1990    Beginning/End Dates of Reporting Period:  06/15/2022 to 2023 (7 visits)    Referring Provider: Gallito Bonilla MD    Therapy Diagnosis: L foot pain     Client Self Report: Didn't verbalize much shrugs and indicates not much change.    Objective Measurements:  Objective Measure: 1st MTP left foot ROM  Details: extension 35 degrees L/ 55 on R  -  Objective Measure: ankle strength  Details: DF 3+/5, Inversion 2+/5, Eversion 3+/5, PF 2+/5 L side  Objective Measure: gait observation  Details: walking with boot - on lateral aspect of foot : unable to put pressure on 1st MTP joint         Goals:  Goal Identifier home program   Goal Description patient will be independent with home exercise program and self management of pain in 8 weeks   Target Date 01/15/23   Date Met      Progress (detail required for progress note): progressing towards slowly as patient is cautious with left foot rom     Goal Identifier walking   Goal Description patient will be able to return to walking for household chores on feet x 20 minutes with pain less than 5/10   Target Date 01/15/23   Date Met      Progress (detail required for progress note): patient continues to wear post surgical boot with flat bottom and walking is very painful and can only tolerate short distances due to pain     Goal Identifier work   Goal Description 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     Goal Identifier     Goal Description     Target Date     Date Met      Progress (detail required for progress note):       Goal Identifier      Goal Description     Target Date     Date Met      Progress (detail required for progress note):       Goal Identifier     Goal Description     Target Date     Date Met      Progress (detail required for progress note):       Goal Identifier     Goal Description     Target Date     Date Met      Progress (detail required for progress note):       Goal Identifier     Goal Description     Target Date     Date Met      Progress (detail required for progress note):             Plan:  Discharge from therapy.    Discharge:    Reason for Discharge: Patient has failed to schedule further appointments.  No progress with PT since initial visit not appropriate for further PT unless symptoms change and patient is able to tolerate more manual therapy, weight bearing, or progression of strengthening.          Discharge Plan: Patient to continue home program as tolerated.

## 2023-01-25 NOTE — ADDENDUM NOTE
Encounter addended by: Mary Martinez, PT on: 1/25/2023 1:20 PM   Actions taken: Episode resolved, Pend clinical note, Flowsheet accepted, Clinical Note Signed

## 2023-02-02 ENCOUNTER — PATIENT OUTREACH (OUTPATIENT)
Dept: CARE COORDINATION | Facility: CLINIC | Age: 33
End: 2023-02-02

## 2023-02-02 NOTE — PROGRESS NOTES
Clinic Care Coordination Contact  Program: [place]   County:  [facility] Case #:  [facility] Worker:   Michael #:   Subscriber #:   Renewal:  Date Applied:     FRW Outreach:   2023 FRW called and patient stated that he was deined MA due to income.    2022 FRW called the ARC line with the patient and he has an open case from  we had to close in order for the new application to be processed FRW will follow up in 3-4 weeks.    1/3/2023 FRW called and completed online application for SNAP and CASH but patient knows he might not get cash due to he is getting his [place] benefits of 170.00 FRW to call later this week due to he just submitted his online application for [employer]. And he is not meghana what the outcome of that was.   SNAP/CASH Application Screenin. Have you had Community Health benefits before? no  2. How many people in the household, do you eat/buy food together? 1  3. What is your monthly income (include all tax members)? 170.00   4. Do you have a bank account?   5. Do you have any utility bills (electricity, rent, mortgage, phone, insurance, medical bills, etc.)? Yes   6. Do you have social security cards and/or green cards? Yes   Health Insurance Screening: MICHAEL   1. Do you currently have health insurance, did you receive a renewal? No   2. If you applied through [employer], do you know your username/password? Yes   3. How many people in the household?  4. Do you file taxes, who do you file with?   5. What is your monthly income (include all tax members)?  6. Do you have access to insurance through an employer (if yes, need EIN)?  7. Do you have social security cards and/or green cards?      Health Insurance:      Referral/Screening:  FRW Screening    Row Name [phone]       County Benefits   Is patient requesting help applying for Community Health benefits? Yes       Have you recently applied for any Community Health benefits? No       How many people in your household? 3       Do you buy/eat food together? No       What is the  monthly gross income for the household (wages, social security, workers comp, and pension)?  0       Insurance:   [place] insurance? No       Is this an insurance renewal? No       Is this a new insurance application request? Yes       Have you recently applied for insurance? No       How many people in your household?  3       Do you file taxes? Yes       How many dependents do you claim? 0       What is the monthly gross income for the household (wages, social security, workers comp, and pension)?  0       OTHER   Is this a cathy care application? No       Any other information for the FRW? he lives with two roommates.  He may have applied on [employer] to see he is can get MA.  has no income since July when worker's comp quit paying. help with [place], MA, Snap please

## 2023-02-06 ENCOUNTER — PATIENT OUTREACH (OUTPATIENT)
Dept: CARE COORDINATION | Facility: CLINIC | Age: 33
End: 2023-02-06
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
## 2023-02-06 NOTE — LETTER
2/6/2023    RE: Victor Manuel Saavedra  [address] [place] [address]      Hi Victor Manuel,  Here is the local food shelf, [place], that says they can deliver:      How The Program Works  Here are many of the logistical details for this new free, grocery delivery program:    What Kinds of Groceries Will I Receive?  Food is sourced from the [place] food shelf program and includes meat, dairy, fresh produce, and shelf-stable items. In each delivery, you will receive on average 25 pounds of food per person in the household.  While we can accommodate customer choice models (i.e. vegetarian, halal, etc.) the food you receive will depend on what is available.  When Will My Food Be Delivered?  Each participant will receive monthly grocery deliveries, unless you only want one delivery.  The service area is divided into routes, and deliveries will be conducted on Tuesdays, Wednesdays, and Thursdays. Deliveries will be dropped off between 12:15 - 1:30 p.m. by a neighborhood volunteer.  Is It Safe?  For food safety, all food will be delivered within 90 minutes of leaving [employer]. During warmer weather, volunteers will have cold packs and coolers to keep food temperature safe.  We will offer contactless delivery. Volunteers will simply leave the food at your door and call you to let you know it has arrived.  You may leave a cooler outside for cold items to be placed in if you wish.  How Do I Sign Up?  To find out if you qualify or get registered for grocery deliveries, please call [phone].  Please note, there is a limit to the number of deliveries we can schedule each day. We will work with participants to schedule deliveries as quickly and as conveniently as possible.    Sincerely,    RON Dwyer

## 2023-02-06 NOTE — LETTER
How The Program Works  Here are many of the logistical details for this new free, grocery delivery program:    What Kinds of Groceries Will I Receive?  Food is sourced from the [employer] Expert shelf program and includes meat, dairy, fresh produce, and shelf-stable items. In each delivery, you will receive on average 25 pounds of food per person in the household.  While we can accommodate customer choice models (i.e. vegetarian, halal, etc.) the food you receive will depend on what is available.  When Will My Food Be Delivered?  Each participant will receive monthly grocery deliveries, unless you only want one delivery.  The service area is divided into routes, and deliveries will be conducted on Tuesdays, Wednesdays, and Thursdays. Deliveries will be dropped off between 12:15 - 1:30 p.m. by a neighborhood volunteer.  Is It Safe?  For food safety, all food will be delivered within 90 minutes of leaving [place]. During warmer weather, volunteers will have cold packs and coolers to keep food temperature safe.  We will offer contactless delivery. Volunteers will simply leave the food at your door and call you to let you know it has arrived.  You may leave a cooler outside for cold items to be placed in if you wish.  How Do I Sign Up?  To find out if you qualify or get registered for grocery deliveries, please call [phone].  Please note, there is a limit to the number of deliveries we can schedule each day. We will work with participants to schedule deliveries as quickly and as conveniently as possible.  HEALTH INSURANCE APPLICATION ASSISTANCE   [facility] - Health insurance application assistance    No criteria met  Operating Status:Phone/Virtual  Address: [address] [place] [id number]  Language: English, Hmong,See more...  Hours:  Today 8:00 AM - 4:30 PM     Fees: Free  Distance: .68 miles   [phone]   Copy Email Address   Copy Website URL  Required Documents: Proof of ID, Proof of Income,  ... See more   Restricted To: Over 18 years  RENT AND MORTGAGE PAYMENT ASSISTANCE   [facility] - Rent and mortgage payment assistance    No criteria met  Operating Status:In-PersonOperating Status:Phone/Virtual  Address: [address] [place] [address]  Language: English, Belarusian  Hours:  Today 8:00 AM - 4:30 PM     Fees: Insurance  Distance: .72 miles   [phone]   Copy Email Address   Copy Website URL  Required Documents: Application, Prior Assessment,... See more  [facility] - [employer] - [address] Supportive [employer] Project-Based Rental Assistance Program ([address] PRA)    No criteria met  Operating Status:Phone/Virtual  Address: [address] [place] [address]  Language: English  Hours:  Today 8:00 AM - 5:00 PM     Fees: Free  Distance: 3.18 miles   [phone]   Copy Email Address   Copy Website URL  Required Documents: Application, Proof of ID, Proof of Income   Restricted To: 18 years to 62 years  UTILITY PAYMENT ASSISTANCE   [employer] (CAP) [facility] - Energy Assistance    Matches Multiple Needs  No criteria met  Operating Status:Phone/Virtual  Address: [address] [place] [address]  Language: English, Hmong,See more...  Hours:  Today 8:00 AM - 4:30 PM     Fees: Free  Distance: .52 miles   [phone]   Copy Email Address   Copy Website URL  Required Documents: Application, Proof of ID, Proo... See more   Restricted To: Over 18 years, Residents of [facility].. See more  [facility]    No criteria met  Operating Status:Phone/Virtual  Address: [address] [place] [address]  Language: English, Hmong,See more...  Hours:  Today 10:00 AM - 3:00 PM     Fees: Free  Distance: 2.78 miles   [phone]   Copy Email Address   Copy Website URL  Required Documents: Application, Proof of ID, Proo... See more  FOOD AND NUTRITION    FOOD PANTRY   [employer] - Emergency Food Shelf    No criteria met  Operating Status:Pickup  Address: [address] [place] [address]  Language: English, Latvian  Hours:  Mon 9:30 AM - 11:30 AM Appt. Only    Fees: Free  Distance: .16 miles   [phone]   Copy Email Address   Copy Website URL  [facility] Food pantry    No criteria met  Operating Status:Pickup  Address: [address] [place] [address]  Language: English, Cook Islander,See more...  Hours:  Today 12:00 PM - 1:00 PM     Fees: Free  Distance: .94 miles   [phone]   Copy Email Address   Copy Website URL  SNAP APPLICATION ASSISTANCE   [employer] (CAP) [facility] - SNAP application assistance    Address: [address] [place] [address]  Language: English  Hours:  Today 8:00 AM - 4:30 PM     Fees: Free  Distance: .52 miles   [phone]     Required Documents: Proof of Disability, Proof of ID      MENTAL HEALTH      MENTAL HEALTH SUPPORT GROUP   [employer]    No criteria met  Operating Status:In-PersonOperating Status:Phone/Virtual  Address: [address] [address]  Language: English  Hours:  Today 12:00 PM - 5:00 PM     Fees: Free  Distance: .75 miles   [phone]   Copy Email Address   Copy Website URL   Restricted To: Over 18 years  [employer] - Mental health support group    No criteria met  Operating Status:In-PersonOperating Status:Phone/Virtual  Address: [address] [place] [address]  Language: English  Hours:  Today 8:00 AM - 5:00 PM     Fees: Free  Distance: 1.17 miles   [phone]   Copy Email Address   Copy Website URL  TRANSPORTATION    FREE OR LOW-COST TRANSPORTATION   [employer] - Free or low-cost transportation    No criteria met  Operating Status:In-Person  Address: [address] [place] [address]  Language: English, Latvian  Hours:  Today 9:00 AM - 5:00 PM     Fees: Free  Distance: .58  miles [phone]   Copy Email Address   Copy Website URL  Required Documents: Proof of ID  [employer] - Free or low-cost transportation    No criteria met  Operating Status:In-Person  Address: [address] [place] [address]  Language: English, Zimbabwean  Hours:  Today Open 24 Hours    Fees: Self Pay  Distance: 3.35 miles   [phone] ext. 2   -   Copy W

## 2023-03-14 ENCOUNTER — PATIENT OUTREACH (OUTPATIENT)
Dept: CARE COORDINATION | Facility: CLINIC | Age: 33
End: 2023-03-14

## 2023-03-14 NOTE — LETTER
[facility] CARE COORDINATION  [facility]    March 29, 2023        Victor Manuel Saavedra  [address] [place] [address]          Dear Victor Manuel,     Attached is an updated Complex Care Plan for your continued enrollment in Care Coordination. Please let us know if you have additional questions, concerns, or goals that we can assist with.    Sincerely,    Paloma Castle,   [facility]  [phone]        [facility]  Patient Centered Plan of Care  About Me:        Patient Name:  Victor Manuel Saavedra    YOB: 1990  Age:         32 year old   [employer] MRN:    [id number] Telephone Information:  Home Phone [phone]   Mobile [phone]   Home Phone Not on file.       Address:  [address] [place] [address] Email address:  [email or web address]      Emergency Contact(s)    Name Relationship Lgl Grd Work Phone Home Phone Mobile Phone   1AMELIA RAINES Mother   [phone]            Primary language:  English     needed? No   [employer]:  [phone] op. 1  Other communication barriers:None    Preferred Method of Communication:     Current living arrangement: I live in a private home    Mobility Status/ Medical Equipment: Independent w/Device        Health Maintenance  Health Maintenance Reviewed: Due/Overdue   Health Maintenance Due   Topic Date Due     YEARLY PREVENTIVE VISIT  Never done     ADVANCE CARE PLANNING  Never done     DEPRESSION ACTION PLAN  Never done     HIV SCREENING  Never done     HEPATITIS C SCREENING  Never done     COVID-19 Vaccine (3 - Booster for Moderna series) 05/21/2022     INFLUENZA VACCINE (1) 09/01/2022           My Access Plan  Medical Emergency 911   Primary Clinic Line [facility] - [phone]   24 Hour Appointment Line [phone] or  [phone]) (toll-free)   24 Hour Nurse Line [phone] (toll-free)   Preferred Urgent Care Other     Preferred Hospital  Other     Preferred Pharmacy [facility] - [place], [place] - [address] [place]     Behavioral Health Crisis Line The National Suicide Prevention Lifeline at 1-951.858.1225 or Text/Call 988             My Care Team Members  Patient Care Team       Relationship Specialty Notifications Start End    Enrique Rubio MD PCP - General Internal Medicine  12/22/22     Phone: [phone] Pager: [phone] Fax: [phone]        [facility] [place] [address]    Jonel Mario MD MD Neurology  10/27/22     Phone: [phone] Fax: [phone]         [address] [address]    Gallito Bonilla MD Assigned Musculoskeletal Provider   12/10/22     Phone: [phone] Fax: [phone]         [address] [facility] [address]    Fermin Walter MD Assigned Behavioral Health Provider   12/10/22     Phone: [phone] Fax: [phone]         [facility] [address]    Enrique Rubio MD Assigned PCP   12/24/22     Phone: [phone] Pager: [phone] Fax: [phone]        [address] [place] [address]    Paloma Castle LSW Lead Care Coordinator Primary Care - CC Admissions 12/29/22     Phone: [phone]         Jonel Mario MD Assigned Neuroscience Provider   1/21/23     Phone: [phone] Fax: [phone]         [address] [address]            My Care Plans  Self Management and Treatment Plan  Care Plan  Care Plan: Mental Health     Problem: Mood/Psychosocial Concerns     Goal: Establish Mental Health Support     Start Date: 12/29/2022 Expected End Date: 5/1/2023    This Visit's Progress: 10%    Priority: High    Note:     Barriers: doesn't have insurance right now.  Strengths: would like to have support.   Patient expressed understanding of goal: yes  Action steps to achieve this goal:  1. I will apply for insurance.   2. I will call [facility] or crisis lines for support.   3. I will report progress towards this goal at scheduled outreach telephone calls from the CCC team.                               Advance Care Plans/Directives Type:   No data recorded    My Medical and Care Information  Problem List   Patient Active Problem List   Diagnosis     Dysthymic Disorder     Traumatic Urethral Stricture     History of seizure     Nicotine dependence      Current Medications and Allergies:    Current Outpatient Medications   Medication Instructions     diclofenac (VOLTAREN) 2 g, Topical, 4 TIMES DAILY     hydrOXYzine (ATARAX) 25-50 mg, Oral, EVERY 6 HOURS PRN     multivitamin (MULTIVITAMIN) per tablet 1 tablet, Oral, DAILY     Vitamin D3 (VITAMIN D) 400 Units, Oral, DAILY         Care Coordination Start Date: 12/22/2022   Frequency of Care Coordination: monthly     Form Last Updated: 03/29/2023

## 2023-03-14 NOTE — PROGRESS NOTES
Clinic Care Coordination Contact    Follow Up Progress Note      Assessment: Call back from patient.  He did not call [employer] to look at food delivery and will review website and call if interested.  He is not eligible for MA he says.  He is living in the same place.  He knows he can call [facility] if he wants to talk to a counselor for free.     Care Gaps:    Health Maintenance Due   Topic Date Due     YEARLY PREVENTIVE VISIT  Never done     ADVANCE CARE PLANNING  Never done     DEPRESSION ACTION PLAN  Never done     HIV SCREENING  Never done     HEPATITIS C SCREENING  Never done     COVID-19 Vaccine (3 - Booster for Moderna series) 05/21/2022     INFLUENZA VACCINE (1) 09/01/2022       Postponed to not having insurance.      Care Plans  Care Plan: Mental Health     Problem: Mood/Psychosocial Concerns     Goal: Establish Mental Health Support     Start Date: 12/29/2022 Expected End Date: 5/1/2023    This Visit's Progress: 10%    Priority: High    Note:     Barriers: doesn't have insurance right now.  Strengths: would like to have support.   Patient expressed understanding of goal: yes  Action steps to achieve this goal:  1. I will apply for insurance.   2. I will call [facility] or crisis lines for support.   3. I will report progress towards this goal at scheduled outreach telephone calls from the CCC team.                            Intervention/Education provided during outreach: Support, food shelf. He wants to continue to receive care coordination support.      Outreach Frequency: monthly      Plan:     Care Coordinator will follow up in 30 days.     Contact  [facility]/Voicemail    Referral Source: Care Team  Clinical Data:  Outreach  Outreach attempted x 1.  Left message on voicemail with call back information and requested return call.  Plan:  will try to reach patient again in 10 business days.  Paloma Castle,   [facility]  [phone]

## 2023-04-25 ENCOUNTER — PATIENT OUTREACH (OUTPATIENT)
Dept: CARE COORDINATION | Facility: CLINIC | Age: 33
End: 2023-04-25

## 2023-04-25 NOTE — PROGRESS NOTES
Contact  [facility]/Voicemail    Referral Source: Care Team  Clinical Data:  Outreach  Outreach attempted x 2.  Left message on voicemail with call back information and requested return call.  Plan:  will send disenrollment letter with care coordinator contact information.  will do no further outreaches at this time.  Social Michael Dwyer  [facility]  [phone]       Contact  [facility]/Voicemail    Referral Source: Care Team  Clinical Data:  Outreach  Outreach attempted x 1.  Left message on voicemail with call back information and requested return call.  Plan: will try to reach patient again in 10 business days.  Social Michael Dwyer  [facility]  [phone]

## 2023-04-25 NOTE — LETTER
[facility] CARE COORDINATION  [facility]   May 9, 2023    Victor Manuel Saavedra  [address] [place] [address]      Dear Victor Manuel,    I have been attempting to reach you since our last contact. I would like to continue to work with you and provide any additional support you may need on achieving your health care related goals. I would appreciate if you would give me a call at [phone] to let me know if you would like to continue working together. I know that there are many things that can affect our ability to communicate and I hope we can continue to work together.    All of us at the [facility] are invested in your health and are here to assist you in meeting your goals.     Sincerely,    RON Dwyer

## 2023-09-09 ENCOUNTER — HEALTH MAINTENANCE LETTER (OUTPATIENT)
Age: 33
End: 2023-09-09

## 2024-11-02 ENCOUNTER — HEALTH MAINTENANCE LETTER (OUTPATIENT)
Age: 34
End: 2024-11-02